# Patient Record
Sex: MALE | Race: BLACK OR AFRICAN AMERICAN | NOT HISPANIC OR LATINO | Employment: OTHER | URBAN - METROPOLITAN AREA
[De-identification: names, ages, dates, MRNs, and addresses within clinical notes are randomized per-mention and may not be internally consistent; named-entity substitution may affect disease eponyms.]

---

## 2017-02-20 ENCOUNTER — OFFICE VISIT (OUTPATIENT)
Dept: GASTROENTEROLOGY | Facility: CLINIC | Age: 72
End: 2017-02-20
Payer: MEDICARE

## 2017-02-20 ENCOUNTER — PATIENT MESSAGE (OUTPATIENT)
Dept: GASTROENTEROLOGY | Facility: CLINIC | Age: 72
End: 2017-02-20

## 2017-02-20 VITALS
HEART RATE: 78 BPM | DIASTOLIC BLOOD PRESSURE: 66 MMHG | HEIGHT: 69 IN | WEIGHT: 168.44 LBS | BODY MASS INDEX: 24.95 KG/M2 | SYSTOLIC BLOOD PRESSURE: 120 MMHG

## 2017-02-20 DIAGNOSIS — K92.1 HEMATOCHEZIA: Primary | ICD-10-CM

## 2017-02-20 DIAGNOSIS — Z86.010 PERSONAL HISTORY OF COLONIC POLYPS: ICD-10-CM

## 2017-02-20 DIAGNOSIS — K62.7 RADIATION PROCTITIS: ICD-10-CM

## 2017-02-20 DIAGNOSIS — K64.8 INTERNAL HEMORRHOIDS: ICD-10-CM

## 2017-02-20 DIAGNOSIS — K57.30 DIVERTICULOSIS OF LARGE INTESTINE WITHOUT HEMORRHAGE: ICD-10-CM

## 2017-02-20 PROCEDURE — 1126F AMNT PAIN NOTED NONE PRSNT: CPT | Mod: S$GLB,,, | Performed by: INTERNAL MEDICINE

## 2017-02-20 PROCEDURE — 99999 PR PBB SHADOW E&M-EST. PATIENT-LVL III: CPT | Mod: PBBFAC,,, | Performed by: INTERNAL MEDICINE

## 2017-02-20 PROCEDURE — 99215 OFFICE O/P EST HI 40 MIN: CPT | Mod: S$GLB,,, | Performed by: INTERNAL MEDICINE

## 2017-02-20 PROCEDURE — 1160F RVW MEDS BY RX/DR IN RCRD: CPT | Mod: S$GLB,,, | Performed by: INTERNAL MEDICINE

## 2017-02-20 PROCEDURE — 3078F DIAST BP <80 MM HG: CPT | Mod: S$GLB,,, | Performed by: INTERNAL MEDICINE

## 2017-02-20 PROCEDURE — 3074F SYST BP LT 130 MM HG: CPT | Mod: S$GLB,,, | Performed by: INTERNAL MEDICINE

## 2017-02-20 PROCEDURE — 1159F MED LIST DOCD IN RCRD: CPT | Mod: S$GLB,,, | Performed by: INTERNAL MEDICINE

## 2017-02-20 PROCEDURE — 1157F ADVNC CARE PLAN IN RCRD: CPT | Mod: S$GLB,,, | Performed by: INTERNAL MEDICINE

## 2017-02-20 RX ORDER — SODIUM, POTASSIUM,MAG SULFATES 17.5-3.13G
SOLUTION, RECONSTITUTED, ORAL ORAL
Qty: 1 BOTTLE | Refills: 0 | Status: ON HOLD | OUTPATIENT
Start: 2017-02-20 | End: 2017-04-03 | Stop reason: CLARIF

## 2017-02-20 RX ORDER — LEVOTHYROXINE SODIUM 25 UG/1
TABLET ORAL
Refills: 4 | COMMUNITY
Start: 2017-02-10 | End: 2018-01-11

## 2017-02-20 RX ORDER — LISINOPRIL AND HYDROCHLOROTHIAZIDE 10; 12.5 MG/1; MG/1
1 TABLET ORAL DAILY
COMMUNITY
End: 2018-01-11

## 2017-02-20 RX ORDER — SODIUM CHLORIDE, SODIUM LACTATE, POTASSIUM CHLORIDE, CALCIUM CHLORIDE 600; 310; 30; 20 MG/100ML; MG/100ML; MG/100ML; MG/100ML
INJECTION, SOLUTION INTRAVENOUS CONTINUOUS
Status: CANCELLED | OUTPATIENT
Start: 2017-02-20

## 2017-02-20 NOTE — PROGRESS NOTES
Subjective:       Patient ID: David Amaro is a 71 y.o. male.    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 His last colonoscopy was 3 years ago.     Rectal Bleeding   This is a chronic problem. The current episode started more than 1 year ago. The problem occurs intermittently. The problem has been unchanged. Pertinent negatives include no abdominal pain, anorexia, arthralgias, change in bowel habit, chest pain, chills, congestion, coughing, diaphoresis, fatigue, fever, headaches, joint swelling, myalgias, nausea, neck pain, numbness, rash, sore throat, swollen glands, urinary symptoms, vertigo, visual change, vomiting or weakness.     Past Medical History   Diagnosis Date    Cancer      prostate    Dyslipidemia     HTN (hypertension)     Prostate cancer      Past Surgical History   Procedure Laterality Date    Hernia repair  2009 approx    Seed implants in prostate      Tonsillectomy       Current Outpatient Prescriptions on File Prior to Visit   Medication Sig Dispense Refill    hydrochlorothiazide (MICROZIDE) 12.5 mg capsule Take 1 capsule (12.5 mg total) by mouth once daily. 30 capsule 12    tamsulosin (FLOMAX) 0.4 mg Cp24 Take 0.4 mg by mouth once daily.       No current facility-administered medications on file prior to visit.      Review of patient's allergies indicates:   Allergen Reactions    No known drug allergies      Social History     Social History    Marital status:      Spouse name: N/A    Number of children: N/A    Years of education: N/A     Occupational History    Not on file.     Social History Main Topics    Smoking status: Never Smoker    Smokeless tobacco: Never Used    Alcohol use No    Drug use: No    Sexual activity: Yes     Partners: Female     Other Topics Concern    Not on file     Social History Narrative     Family History   Problem Relation  Age of Onset    Stroke Mother     Cancer Mother     Colon cancer Mother     Diabetes Father     Cancer Sister      lung    Cancer Brother     Bone cancer Brother     Heart disease Maternal Grandmother          Review of Systems   Constitutional: Negative for chills, diaphoresis, fatigue and fever.   HENT: Negative.  Negative for congestion and sore throat.    Respiratory: Negative for cough.    Cardiovascular: Negative for chest pain.   Gastrointestinal: Positive for anal bleeding and hematochezia. Negative for abdominal pain, anorexia, change in bowel habit, nausea and vomiting.   Endocrine: Negative.    Genitourinary: Negative.    Musculoskeletal: Negative for arthralgias, joint swelling, myalgias and neck pain.   Skin: Negative for rash.   Allergic/Immunologic: Negative.    Neurological: Negative.  Negative for vertigo, weakness, numbness and headaches.   Hematological: Negative.    Psychiatric/Behavioral: Negative.        Objective:      Physical Exam   Constitutional: He is oriented to person, place, and time. He appears well-developed and well-nourished.   HENT:   Head: Normocephalic and atraumatic.   Eyes: Conjunctivae are normal. Pupils are equal, round, and reactive to light.   Neck: Normal range of motion. Neck supple. No tracheal deviation present. No thyromegaly present.   Cardiovascular: Normal rate and regular rhythm.    Pulmonary/Chest: Effort normal and breath sounds normal. He has no wheezes. He has no rales. He exhibits no tenderness.   Abdominal: Soft. There is no tenderness.   Musculoskeletal: Normal range of motion.   Lymphadenopathy:     He has no cervical adenopathy.   Neurological: He is alert and oriented to person, place, and time. No cranial nerve deficit.   Skin: Skin is warm and dry.   Psychiatric: He has a normal mood and affect. His behavior is normal.   Nursing note and vitals reviewed.      Assessment:       1. Hematochezia    2. Personal history of colonic polyps    3.  Internal hemorrhoids    4. Radiation proctitis    5. Diverticulosis of large intestine without hemorrhage        Plan:       Hematochezia  -     sodium,potassium,mag sulfates (SUPREP BOWEL PREP KIT) 17.5-3.13-1.6 gram SolR; As directed  Dispense: 1 Bottle; Refill: 0  -     Case request GI: COLONOSCOPY    Personal history of colonic polyps  Comments:  Last Colonoscopy in 2014  Risks, benefits and alternative options were discussed with the patient. Risks including but not limited to infection, bleeding, heart or respiratory problems and perforation that may require surgery were all explained to the patient. The patient had a chance for questions if there were doubts or concerns about the test. The referring provider will be notified that our consultation is complete and available through the patient's records.    Thanks for letting us participate in the care of this nice patient,      Olivier Grayson

## 2017-02-20 NOTE — MR AVS SNAPSHOT
[facility]  [address] [facility] [address]  Phone: [phone]  Fax: [phone]                  David Amaro   2017 11:20 AM   Office Visit    Description:  Male : 1945   Provider:  Olivier Grayson MD   Department:  [facility]           Reason for Visit     Rectal Bleeding           Diagnoses this Visit        Comments    Hematochezia    -  Primary     Personal history of colonic polyps     Last Colonoscopy in     Internal hemorrhoids         Radiation proctitis         Diverticulosis of large intestine without hemorrhage                To Do List           Goals (5 Years of Data)     None      Follow-Up and Disposition     Return if symptoms worsen or fail to improve.       These Medications        Disp Refills Start End    sodium,potassium,mag sulfates (SUPREP BOWEL PREP KIT) 17.5-3.13-1.6 gram SolR 1 Bottle 0 2017     As directed    Pharmacy: [facility] [address] [place] [address] [facility]  [place] Ph #: [phone]         [facility]     [facility] On Call Nurse Care Line -  Assistance  Registered nurses in the [facility] provide clinical advisement, health education, appointment booking, and other advisory services.  Call for this free service at [phone].             Medications           Message regarding Medications     Verify the changes and/or additions to your medication regime listed below are the same as discussed with your clinician today.  If any of these changes or additions are incorrect, please notify your healthcare provider.        START taking these NEW medications        Refills    sodium,potassium,mag sulfates (SUPREP BOWEL PREP KIT) 17.5-3.13-1.6 gram SolR 0    Sig: As directed    Class: Normal           Verify that the below list of medications is an accurate representation of the medications you are currently taking.  If none  "reported, the list may be blank. If incorrect, please contact your healthcare provider. Carry this list with you in case of emergency.           Current Medications     levothyroxine (SYNTHROID) 25 MCG tablet TK 1 T PO QD    lisinopril-hydrochlorothiazide (PRINZIDE,ZESTORETIC) 10-12.5 mg per tablet Take 1 tablet by mouth once daily.    hydrochlorothiazide (MICROZIDE) 12.5 mg capsule Take 1 capsule (12.5 mg total) by mouth once daily.    sodium,potassium,mag sulfates (SUPREP BOWEL PREP KIT) 17.5-3.13-1.6 gram SolR As directed    tamsulosin (FLOMAX) 0.4 mg Cp24 Take 0.4 mg by mouth once daily.           Clinical Reference Information           Your Vitals Were     BP Pulse Height Weight BMI    120/66 78 5' 9" (1.753 m) 76.4 kg (168 lb 6.9 oz) 24.87 kg/m2      Blood Pressure          Most Recent Value    BP  120/66      Allergies as of 2/20/2017     No Known Drug Allergies      Immunizations Administered on Date of Encounter - 2/20/2017     None      Orders Placed During Today's Visit      Normal Orders This Visit    Case request GI: COLONOSCOPY       Instructions      Diverticulosis    Diverticulosis means that small pouches have formed in the wall of your large intestine (colon). Most often, this problem causes no symptoms and is common as people age. But the pouches in the colon are at risk of becoming infected. When this happens, the condition is called diverticulitis. Although most people with diverticulosis never develop diverticulitis, it is still not uncommon. Rectal bleeding can also occur and in less common situations, a type of colon inflammation called colitis.  While most people do not have symptoms, some people with diverticulosis may have:  · Abdominal cramps and pain  · Bloating  · Constipation  · Change in bowel habits  Causes  The exact cause of diverticulosis (and diverticulitis) has not been proved, but a few things are associated with the condition:  · Low-fiber diet  · Constipation  · Lack of " exercise  Your healthcare provider will talk with you about how to manage your condition. Diet changes may be all that are needed to help control diverticulosis and prevent progression to diverticulitis. If you develop diverticulitis, you will likely need other treatments.  Home care  You may be told to take fiber supplements daily. Fiber adds bulk to the stool so that it passes through the colon more easily. Stool softeners may be recommended. You may also be given medications for pain relief. Be sure to take all medications as directed.  In the past, people were told to avoid corn, nuts, and seeds. This is no longer necessary.  Follow these guidelines when caring for yourself at home:  · Eat unprocessed foods that are high in fiber. Whole grains, fruits, and vegetables are good choices.  · Drink 6 to 8 glasses of water every day unless your healthcare provider has you limit how much fluid you should have.  · Watch for changes in your bowel movements. Tell your provider if you notice any changes.  · Begin an exercise program. Ask your provider how to get started. Generally, walking is the best.  · Get plenty of rest and sleep.  Follow-up care  Follow up with your healthcare provider, or as advised. Regular visits may be needed to check on your health. Sometimes special procedures such as colonoscopy, are needed after an episode of diverticulitis or blooding. Be sure to keep all your appointments.  If a stool sample was taken, or cultures were done, you should be told if they are positive, or if your treatment needs to be changed. You can call as directed for the results.  If X-rays were done, a radiologist will look at them. You will be told if there is a change in your treatment.  If antibiotics were prescribed, be sure to finish them all.  When to seek medical advice  Call your healthcare provider right away if any of these occur:  · Fever of 100.4°F (38°C) or higher, or as directed by your healthcare  provider  · Severe cramps in the lower left side of the abdomen or pain that is getting worse  · Tenderness in the lower left side of the abdomen or worsening pain throughout the abdomen  · Diarrhea or constipation that doesn't get better within 24 hours  · Nausea and vomiting  · Bleeding from the rectum  Call 911  Call emergency services if any of the following occur:  · Trouble breathing  · Confusion  · Very drowsy or trouble awakening  · Fainting or loss of consciousness  · Rapid heart rate  · Chest pain  Date Last Reviewed: 12/30/2015 © 2000-2016 [employer]. [address] [place] [address]. All rights reserved. This information is not intended as a substitute for professional medical care. Always follow your healthcare professional's instructions.        Hemorrhoids    Hemorrhoids are swollen and inflamed veins inside the rectum and near the anus. The rectum is the last several inches of the colon. The anus is the passage between the rectum and the outside of the body.  Causes  The veins can become swollen due to increased pressure in them. This is most often caused by:  · Chronic constipation or diarrhea  · Straining when having a bowel movement  · Sitting too long on the toilet  · A low-fiber diet  · Pregnancy  Symptoms  · Bleeding from the rectum (this may be noticeable after bowel movements)  · Lump near the anus  · Itching around the anus  · Pain around the anus  There are different types of hemorrhoids. Depending on the type you have and the severity, you may be able to treat yourself at home. In some cases, a procedure may be the best treatment option. Your healthcare provider can tell you more about this, if needed.  Home care  General care  · To get relief from pain or itching, try:  ¨ Topical products. Your healthcare provider may prescribe or recommend creams, ointments, or pads that can be applied to the hemorrhoid. Use these exactly as directed.  ¨ Medicines. Your  healthcare provider may recommend stool softeners, suppositories, or laxatives to help manage constipation. Use these exactly as directed.  ¨ Sitz baths. A sitz bath involves sitting in a few inches of warm bath water. Be careful not to make the water so hot that you burn yourself--test it before sitting in it. Soak for about 10 to 15 minutes a few times a day. This may help relieve pain.  Tips to help prevent hemorrhoids  · Eat more fiber. Fiber adds bulk to stool and absorbs water as it moves through your colon. This makes stool softer and easier to pass.  ¨ Increase the fiber in your diet with more fiber-rich foods. These include fresh fruit, vegetables, and whole grains.  ¨ Take a fiber supplement or bulking agent, if advised to by your provider. These include products such as psyllium or methylcellulose.  · Drink plenty of water, if directed to by your provider. This can help keep stool soft.  · Be more active. Frequent exercise aids digestion and helps prevent constipation. It may also help make bowel movements more regular.  · Dont strain during bowel movements. This can make hemorrhoids more likely. Also, dont sit on the toilet for long periods of time.  Follow-up care  Follow up with your healthcare provider, or as advised. If a culture or imaging tests were done, you will be notified of the results when they are ready. This may take a few days or longer.  When to seek medical advice  Call your healthcare provider right away if any of these occur:  · Increased bleeding from the rectum  · Increased pain around the rectum or anus  · Weakness or dizziness  Call 911  Call 911 or return to the emergency department right away if any of these occur:  · Trouble breathing or swallowing  · Fainting or loss of consciousness  · Unusually fast heart rate  · Vomiting blood  · Large amounts of blood in stool  Date Last Reviewed: 6/22/2015  © 9988-2809 [employer]. [address] [place] [address], [place] [address].  All rights reserved. This information is not intended as a substitute for professional medical care. Always follow your healthcare professional's instructions.        Colonoscopy     A camera attached to a flexible tube with a viewing lens is used to take video pictures.     Colonoscopy is a test to view the inside of your lower digestive tract (colon and rectum). Sometimes it can show the last part of the small intestine (ileum). During the test, small pieces of tissue may be removed for testing. This is called a biopsy. Small growths, such as polyps, may also be removed.   Why is colonoscopy done?  The test is done to help look for colon cancer. And it can help find the source of abdominal pain, bleeding, and changes in bowel habits. It may be needed once a year, depending on factors such as your:  · Age  · Health history  · Family health history  · Symptoms  · Results from any prior colonoscopy  Risks and possible complications  These include:  · Bleeding               · A puncture or tear in the colon   · Risks of anesthesia  · A cancer lesion not being seen  Getting ready   To prepare for the test:  · Talk with your healthcare provider about the risks of the test (see below). Also ask your healthcare provider about alternatives to the test.  · Tell your healthcare provider about any medicines you take. Also tell him or her about any health conditions you may have.  · Make sure your rectum and colon are empty for the test. Follow the diet and bowel prep instructions exactly. If you dont, the test may need to be rescheduled.  · Plan for a friend or family member to drive you home after the test.     Colonoscopy provides an inside view of the entire colon.     You may discuss the results with your doctor right away or at a future visit.  During the test   The test is usually done in the hospital on an outpatient basis. This means you go home the same day. The procedure takes about 30 minutes. During that  time:  · You are given relaxing (sedating) medicine through an IV line. You may be drowsy, or fully asleep.  · The healthcare provider will first give you a physical exam to check for anal and rectal problems.  · Then the anus is lubricated and the scope inserted.  · If you are awake, you may have a feeling similar to needing to have a bowel movement. You may also feel pressure as air is pumped into the colon. Its OK to pass gas during the procedure.  · Biopsy, polyp removal, or other treatments may be done during the test.  After the test   You may have gas right after the test. It can help to try to pass it to help prevent later bloating. Your healthcare provider may discuss the results with you right away. Or you may need to schedule a follow-up visit to talk about the results. After the test, you can go back to your normal eating and other activities. You may be tired from the sedation and need to rest for a few hours.  Date Last Reviewed: 11/1/2016 © 2000-2016 [employer]. [address] [place] [address]. All rights reserved. This information is not intended as a substitute for professional medical care. Always follow your healthcare professional's instructions.      Hematochezia  -     sodium,potassium,mag sulfates (SUPREP BOWEL PREP KIT) 17.5-3.13-1.6 gram SolR; As directed  Dispense: 1 Bottle; Refill: 0  -     Case request GI: COLONOSCOPY  -     Vital signs; Standing  -     Up ad muriel before sedation, with assistance as needed; Standing  -     POCT glucose now if patient is diabetic; Standing  -     Insert peripheral IV or access implanted port - a - cath if present; Standing  -     Diet NPO; Standing  -     lactated ringers infusion; Inject into the vein continuous.  -     Protime-INR; Standing    Personal history of colonic polyps  Comments:  Last Colonoscopy in 2014  Orders:  -     Vital signs; Standing  -     Up ad muriel before sedation, with assistance as needed; Standing  -      POCT glucose now if patient is diabetic; Standing  -     Insert peripheral IV or access implanted port - a - cath if present; Standing  -     Diet NPO; Standing  -     lactated ringers infusion; Inject into the vein continuous.  -     Protime-INR; Standing    Internal hemorrhoids  -     Vital signs; Standing  -     Up ad muriel before sedation, with assistance as needed; Standing  -     POCT glucose now if patient is diabetic; Standing  -     Insert peripheral IV or access implanted port - a - cath if present; Standing  -     Diet NPO; Standing  -     lactated ringers infusion; Inject into the vein continuous.  -     Protime-INR; Standing    Radiation proctitis  -     Vital signs; Standing  -     Up ad muriel before sedation, with assistance as needed; Standing  -     POCT glucose now if patient is diabetic; Standing  -     Insert peripheral IV or access implanted port - a - cath if present; Standing  -     Diet NPO; Standing  -     lactated ringers infusion; Inject into the vein continuous.  -     Protime-INR; Standing    Diverticulosis of large intestine without hemorrhage  -     Vital signs; Standing  -     Up ad muriel before sedation, with assistance as needed; Standing  -     POCT glucose now if patient is diabetic; Standing  -     Insert peripheral IV or access implanted port - a - cath if present; Standing  -     Diet NPO; Standing  -     lactated ringers infusion; Inject into the vein continuous.  -     Protime-INR; Standing             Language Assistance Services     ATTENTION: Language assistance services are available, free of charge. Please call [phone].      ATENCIÓN: Si habla español, tiene a benitez disposición servicios gratuitos de asistencia lingüística. Llame al [phone].     CHÚ Ý: N?u b?n nói Ti?ng Vi?t, có các d?ch v? h? tr? ngôn ng? mi?n phí dành cho b?n. G?i s? [phone].         [facility] complies with applicable Federal civil rights laws and does not discriminate on  the basis of race, color, national origin, age, disability, or sex.

## 2017-02-20 NOTE — PATIENT INSTRUCTIONS
Diverticulosis    Diverticulosis means that small pouches have formed in the wall of your large intestine (colon). Most often, this problem causes no symptoms and is common as people age. But the pouches in the colon are at risk of becoming infected. When this happens, the condition is called diverticulitis. Although most people with diverticulosis never develop diverticulitis, it is still not uncommon. Rectal bleeding can also occur and in less common situations, a type of colon inflammation called colitis.  While most people do not have symptoms, some people with diverticulosis may have:  · Abdominal cramps and pain  · Bloating  · Constipation  · Change in bowel habits  Causes  The exact cause of diverticulosis (and diverticulitis) has not been proved, but a few things are associated with the condition:  · Low-fiber diet  · Constipation  · Lack of exercise  Your healthcare provider will talk with you about how to manage your condition. Diet changes may be all that are needed to help control diverticulosis and prevent progression to diverticulitis. If you develop diverticulitis, you will likely need other treatments.  Home care  You may be told to take fiber supplements daily. Fiber adds bulk to the stool so that it passes through the colon more easily. Stool softeners may be recommended. You may also be given medications for pain relief. Be sure to take all medications as directed.  In the past, people were told to avoid corn, nuts, and seeds. This is no longer necessary.  Follow these guidelines when caring for yourself at home:  · Eat unprocessed foods that are high in fiber. Whole grains, fruits, and vegetables are good choices.  · Drink 6 to 8 glasses of water every day unless your healthcare provider has you limit how much fluid you should have.  · Watch for changes in your bowel movements. Tell your provider if you notice any changes.  · Begin an exercise program. Ask your provider how to get started.  Generally, walking is the best.  · Get plenty of rest and sleep.  Follow-up care  Follow up with your healthcare provider, or as advised. Regular visits may be needed to check on your health. Sometimes special procedures such as colonoscopy, are needed after an episode of diverticulitis or blooding. Be sure to keep all your appointments.  If a stool sample was taken, or cultures were done, you should be told if they are positive, or if your treatment needs to be changed. You can call as directed for the results.  If X-rays were done, a radiologist will look at them. You will be told if there is a change in your treatment.  If antibiotics were prescribed, be sure to finish them all.  When to seek medical advice  Call your healthcare provider right away if any of these occur:  · Fever of 100.4°F (38°C) or higher, or as directed by your healthcare provider  · Severe cramps in the lower left side of the abdomen or pain that is getting worse  · Tenderness in the lower left side of the abdomen or worsening pain throughout the abdomen  · Diarrhea or constipation that doesn't get better within 24 hours  · Nausea and vomiting  · Bleeding from the rectum  Call 911  Call emergency services if any of the following occur:  · Trouble breathing  · Confusion  · Very drowsy or trouble awakening  · Fainting or loss of consciousness  · Rapid heart rate  · Chest pain  Date Last Reviewed: 12/30/2015 © 2000-2016 [employer]. [address] [place] [address]. All rights reserved. This information is not intended as a substitute for professional medical care. Always follow your healthcare professional's instructions.        Hemorrhoids    Hemorrhoids are swollen and inflamed veins inside the rectum and near the anus. The rectum is the last several inches of the colon. The anus is the passage between the rectum and the outside of the body.  Causes  The veins can become swollen due to increased pressure in them. This  is most often caused by:  · Chronic constipation or diarrhea  · Straining when having a bowel movement  · Sitting too long on the toilet  · A low-fiber diet  · Pregnancy  Symptoms  · Bleeding from the rectum (this may be noticeable after bowel movements)  · Lump near the anus  · Itching around the anus  · Pain around the anus  There are different types of hemorrhoids. Depending on the type you have and the severity, you may be able to treat yourself at home. In some cases, a procedure may be the best treatment option. Your healthcare provider can tell you more about this, if needed.  Home care  General care  · To get relief from pain or itching, try:  ¨ Topical products. Your healthcare provider may prescribe or recommend creams, ointments, or pads that can be applied to the hemorrhoid. Use these exactly as directed.  ¨ Medicines. Your healthcare provider may recommend stool softeners, suppositories, or laxatives to help manage constipation. Use these exactly as directed.  ¨ Sitz baths. A sitz bath involves sitting in a few inches of warm bath water. Be careful not to make the water so hot that you burn yourself--test it before sitting in it. Soak for about 10 to 15 minutes a few times a day. This may help relieve pain.  Tips to help prevent hemorrhoids  · Eat more fiber. Fiber adds bulk to stool and absorbs water as it moves through your colon. This makes stool softer and easier to pass.  ¨ Increase the fiber in your diet with more fiber-rich foods. These include fresh fruit, vegetables, and whole grains.  ¨ Take a fiber supplement or bulking agent, if advised to by your provider. These include products such as psyllium or methylcellulose.  · Drink plenty of water, if directed to by your provider. This can help keep stool soft.  · Be more active. Frequent exercise aids digestion and helps prevent constipation. It may also help make bowel movements more regular.  · Dont strain during bowel movements. This can make  hemorrhoids more likely. Also, dont sit on the toilet for long periods of time.  Follow-up care  Follow up with your healthcare provider, or as advised. If a culture or imaging tests were done, you will be notified of the results when they are ready. This may take a few days or longer.  When to seek medical advice  Call your healthcare provider right away if any of these occur:  · Increased bleeding from the rectum  · Increased pain around the rectum or anus  · Weakness or dizziness  Call 911  Call 911 or return to the emergency department right away if any of these occur:  · Trouble breathing or swallowing  · Fainting or loss of consciousness  · Unusually fast heart rate  · Vomiting blood  · Large amounts of blood in stool  Date Last Reviewed: 6/22/2015 © 2000-2016 [employer]. [address] [place] [address], [place] [address]. All rights reserved. This information is not intended as a substitute for professional medical care. Always follow your healthcare professional's instructions.        Colonoscopy     A camera attached to a flexible tube with a viewing lens is used to take video pictures.     Colonoscopy is a test to view the inside of your lower digestive tract (colon and rectum). Sometimes it can show the last part of the small intestine (ileum). During the test, small pieces of tissue may be removed for testing. This is called a biopsy. Small growths, such as polyps, may also be removed.   Why is colonoscopy done?  The test is done to help look for colon cancer. And it can help find the source of abdominal pain, bleeding, and changes in bowel habits. It may be needed once a year, depending on factors such as your:  · Age  · Health history  · Family health history  · Symptoms  · Results from any prior colonoscopy  Risks and possible complications  These include:  · Bleeding               · A puncture or tear in the colon   · Risks of anesthesia  · A cancer lesion not being seen  Getting ready   To  prepare for the test:  · Talk with your healthcare provider about the risks of the test (see below). Also ask your healthcare provider about alternatives to the test.  · Tell your healthcare provider about any medicines you take. Also tell him or her about any health conditions you may have.  · Make sure your rectum and colon are empty for the test. Follow the diet and bowel prep instructions exactly. If you dont, the test may need to be rescheduled.  · Plan for a friend or family member to drive you home after the test.     Colonoscopy provides an inside view of the entire colon.     You may discuss the results with your doctor right away or at a future visit.  During the test   The test is usually done in the hospital on an outpatient basis. This means you go home the same day. The procedure takes about 30 minutes. During that time:  · You are given relaxing (sedating) medicine through an IV line. You may be drowsy, or fully asleep.  · The healthcare provider will first give you a physical exam to check for anal and rectal problems.  · Then the anus is lubricated and the scope inserted.  · If you are awake, you may have a feeling similar to needing to have a bowel movement. You may also feel pressure as air is pumped into the colon. Its OK to pass gas during the procedure.  · Biopsy, polyp removal, or other treatments may be done during the test.  After the test   You may have gas right after the test. It can help to try to pass it to help prevent later bloating. Your healthcare provider may discuss the results with you right away. Or you may need to schedule a follow-up visit to talk about the results. After the test, you can go back to your normal eating and other activities. You may be tired from the sedation and need to rest for a few hours.  Date Last Reviewed: 11/1/2016  © 9132-5981 [employer] [address] [place] [address], [place] [address]. All rights reserved. This information is not intended  as a substitute for professional medical care. Always follow your healthcare professional's instructions.      Hematochezia  -     sodium,potassium,mag sulfates (SUPREP BOWEL PREP KIT) 17.5-3.13-1.6 gram SolR; As directed  Dispense: 1 Bottle; Refill: 0  -     Case request GI: COLONOSCOPY  -     Vital signs; Standing  -     Up ad muriel before sedation, with assistance as needed; Standing  -     POCT glucose now if patient is diabetic; Standing  -     Insert peripheral IV or access implanted port - a - cath if present; Standing  -     Diet NPO; Standing  -     lactated ringers infusion; Inject into the vein continuous.  -     Protime-INR; Standing    Personal history of colonic polyps  Comments:  Last Colonoscopy in 2014  Orders:  -     Vital signs; Standing  -     Up ad muriel before sedation, with assistance as needed; Standing  -     POCT glucose now if patient is diabetic; Standing  -     Insert peripheral IV or access implanted port - a - cath if present; Standing  -     Diet NPO; Standing  -     lactated ringers infusion; Inject into the vein continuous.  -     Protime-INR; Standing    Internal hemorrhoids  -     Vital signs; Standing  -     Up ad muriel before sedation, with assistance as needed; Standing  -     POCT glucose now if patient is diabetic; Standing  -     Insert peripheral IV or access implanted port - a - cath if present; Standing  -     Diet NPO; Standing  -     lactated ringers infusion; Inject into the vein continuous.  -     Protime-INR; Standing    Radiation proctitis  -     Vital signs; Standing  -     Up ad muriel before sedation, with assistance as needed; Standing  -     POCT glucose now if patient is diabetic; Standing  -     Insert peripheral IV or access implanted port - a - cath if present; Standing  -     Diet NPO; Standing  -     lactated ringers infusion; Inject into the vein continuous.  -     Protime-INR; Standing    Diverticulosis of large intestine without hemorrhage  -     Vital signs;  Standing  -     Up ad muriel before sedation, with assistance as needed; Standing  -     POCT glucose now if patient is diabetic; Standing  -     Insert peripheral IV or access implanted port - a - cath if present; Standing  -     Diet NPO; Standing  -     lactated ringers infusion; Inject into the vein continuous.  -     Protime-INR; Standing

## 2017-03-31 ENCOUNTER — TELEPHONE (OUTPATIENT)
Dept: GASTROENTEROLOGY | Facility: CLINIC | Age: 72
End: 2017-03-31

## 2017-03-31 RX ORDER — POLYETHYLENE GLYCOL 3350, SODIUM SULFATE ANHYDROUS, SODIUM BICARBONATE, SODIUM CHLORIDE, POTASSIUM CHLORIDE 236; 22.74; 6.74; 5.86; 2.97 G/4L; G/4L; G/4L; G/4L; G/4L
4 POWDER, FOR SOLUTION ORAL ONCE
Qty: 4000 ML | Refills: 0 | Status: SHIPPED | OUTPATIENT
Start: 2017-03-31 | End: 2017-03-31

## 2017-03-31 NOTE — TELEPHONE ENCOUNTER
----- Message from Amalia Mcgarry sent at 3/31/2017 12:37 PM CDT -----  Contact: Patient   Patient stated that he is schedule for an colonoscopy on 4/3/17 and the prep kit was not sent to pharmacy, Please call him at [phone].      [facility] [employer] [id number]  OLEGARIO HERNANDEZ  [address] [facility] AT [facility]  [address] [facility] [place] [address]  Phone: [phone] Fax: [phone]    Thanks  Td

## 2017-03-31 NOTE — TELEPHONE ENCOUNTER
----- Message from Driss Alfred sent at 3/31/2017  3:22 PM CDT -----  Contact: Patient  Please call pt back regarding a substitute for his colonoscopy prep medication. Pt states that his insurance does not cover the medication and would like an alternative. Please call pt back @ ..[phone] (home) Thank you/NH        ..  [employer] [address] [place], [place] [address] - [address] [facility] AT [facility] [place] [facility] [employer] [facility]  [address] [facility] [address]  Phone: [phone] Fax: [phone]

## 2017-04-02 PROBLEM — K57.30 DIVERTICULOSIS OF LARGE INTESTINE WITHOUT HEMORRHAGE: Chronic | Status: ACTIVE | Noted: 2017-04-02

## 2017-04-02 PROBLEM — Z86.0100 HISTORY OF COLON POLYPS: Status: ACTIVE | Noted: 2017-04-02

## 2017-04-02 PROBLEM — K62.7 RADIATION INDUCED PROCTITIS: Chronic | Status: ACTIVE | Noted: 2017-04-02

## 2017-04-02 PROBLEM — Z86.010 HISTORY OF COLON POLYPS: Status: ACTIVE | Noted: 2017-04-02

## 2017-04-02 PROBLEM — K92.1 HEMATOCHEZIA: Status: ACTIVE | Noted: 2017-04-02

## 2017-04-02 RX ORDER — SODIUM CHLORIDE, SODIUM LACTATE, POTASSIUM CHLORIDE, CALCIUM CHLORIDE 600; 310; 30; 20 MG/100ML; MG/100ML; MG/100ML; MG/100ML
INJECTION, SOLUTION INTRAVENOUS CONTINUOUS
Status: CANCELLED | OUTPATIENT
Start: 2017-04-02

## 2017-04-03 ENCOUNTER — ANESTHESIA EVENT (OUTPATIENT)
Dept: ENDOSCOPY | Facility: HOSPITAL | Age: 72
End: 2017-04-03
Payer: MEDICARE

## 2017-04-03 ENCOUNTER — SURGERY (OUTPATIENT)
Age: 72
End: 2017-04-03

## 2017-04-03 ENCOUNTER — HOSPITAL ENCOUNTER (OUTPATIENT)
Facility: HOSPITAL | Age: 72
Discharge: HOME | End: 2017-04-03
Attending: INTERNAL MEDICINE | Admitting: INTERNAL MEDICINE
Payer: MEDICARE

## 2017-04-03 ENCOUNTER — PATIENT MESSAGE (OUTPATIENT)
Dept: GASTROENTEROLOGY | Facility: HOSPITAL | Age: 72
End: 2017-04-03

## 2017-04-03 ENCOUNTER — ANESTHESIA (OUTPATIENT)
Dept: ENDOSCOPY | Facility: HOSPITAL | Age: 72
End: 2017-04-03
Payer: MEDICARE

## 2017-04-03 VITALS
BODY MASS INDEX: 24.14 KG/M2 | HEIGHT: 69 IN | SYSTOLIC BLOOD PRESSURE: 122 MMHG | TEMPERATURE: 98 F | HEART RATE: 68 BPM | RESPIRATION RATE: 18 BRPM | OXYGEN SATURATION: 98 % | WEIGHT: 163 LBS | DIASTOLIC BLOOD PRESSURE: 68 MMHG

## 2017-04-03 VITALS — RESPIRATION RATE: 15 BRPM

## 2017-04-03 DIAGNOSIS — Z86.010 PERSONAL HISTORY OF COLONIC POLYPS: ICD-10-CM

## 2017-04-03 DIAGNOSIS — K64.8 INTERNAL HEMORRHOIDS: ICD-10-CM

## 2017-04-03 DIAGNOSIS — K92.1 HEMATOCHEZIA: ICD-10-CM

## 2017-04-03 DIAGNOSIS — D12.3 BENIGN NEOPLASM OF TRANSVERSE COLON: ICD-10-CM

## 2017-04-03 DIAGNOSIS — K57.30 DIVERTICULOSIS OF LARGE INTESTINE WITHOUT HEMORRHAGE: ICD-10-CM

## 2017-04-03 DIAGNOSIS — Z86.010 HISTORY OF COLON POLYPS: Primary | ICD-10-CM

## 2017-04-03 DIAGNOSIS — K62.7 RADIATION PROCTITIS: ICD-10-CM

## 2017-04-03 PROCEDURE — 25000003 PHARM REV CODE 250: Performed by: INTERNAL MEDICINE

## 2017-04-03 PROCEDURE — 37000008 HC ANESTHESIA 1ST 15 MINUTES: Performed by: INTERNAL MEDICINE

## 2017-04-03 PROCEDURE — 27201012 HC FORCEPS, HOT/COLD, DISP: Performed by: INTERNAL MEDICINE

## 2017-04-03 PROCEDURE — 63600175 PHARM REV CODE 636 W HCPCS: Performed by: NURSE ANESTHETIST, CERTIFIED REGISTERED

## 2017-04-03 PROCEDURE — 88305 TISSUE EXAM BY PATHOLOGIST: CPT | Mod: 26,,, | Performed by: PATHOLOGY

## 2017-04-03 PROCEDURE — 37000009 HC ANESTHESIA EA ADD 15 MINS: Performed by: INTERNAL MEDICINE

## 2017-04-03 PROCEDURE — 45380 COLONOSCOPY AND BIOPSY: CPT | Performed by: INTERNAL MEDICINE

## 2017-04-03 PROCEDURE — 25000003 PHARM REV CODE 250: Performed by: NURSE ANESTHETIST, CERTIFIED REGISTERED

## 2017-04-03 PROCEDURE — 45380 COLONOSCOPY AND BIOPSY: CPT | Mod: ,,, | Performed by: INTERNAL MEDICINE

## 2017-04-03 PROCEDURE — 88305 TISSUE EXAM BY PATHOLOGIST: CPT | Performed by: PATHOLOGY

## 2017-04-03 RX ORDER — LIDOCAINE HYDROCHLORIDE 10 MG/ML
INJECTION INFILTRATION; PERINEURAL
Status: DISCONTINUED | OUTPATIENT
Start: 2017-04-03 | End: 2017-04-03

## 2017-04-03 RX ORDER — SODIUM CHLORIDE, SODIUM LACTATE, POTASSIUM CHLORIDE, CALCIUM CHLORIDE 600; 310; 30; 20 MG/100ML; MG/100ML; MG/100ML; MG/100ML
INJECTION, SOLUTION INTRAVENOUS CONTINUOUS
Status: DISCONTINUED | OUTPATIENT
Start: 2017-04-03 | End: 2017-04-03 | Stop reason: HOSPADM

## 2017-04-03 RX ORDER — PROPOFOL 10 MG/ML
VIAL (ML) INTRAVENOUS
Status: DISCONTINUED | OUTPATIENT
Start: 2017-04-03 | End: 2017-04-03

## 2017-04-03 RX ADMIN — PROPOFOL 50 MG: 10 INJECTION, EMULSION INTRAVENOUS at 09:04

## 2017-04-03 RX ADMIN — PROPOFOL 20 MG: 10 INJECTION, EMULSION INTRAVENOUS at 09:04

## 2017-04-03 RX ADMIN — PROPOFOL 30 MG: 10 INJECTION, EMULSION INTRAVENOUS at 09:04

## 2017-04-03 RX ADMIN — LIDOCAINE HYDROCHLORIDE 50 MG: 10 INJECTION, SOLUTION INFILTRATION; PERINEURAL at 09:04

## 2017-04-03 RX ADMIN — SODIUM CHLORIDE, SODIUM LACTATE, POTASSIUM CHLORIDE, AND CALCIUM CHLORIDE: .6; .31; .03; .02 INJECTION, SOLUTION INTRAVENOUS at 08:04

## 2017-04-03 NOTE — H&P
[facility]  Gastroenterology  H&P    Patient Name: David Amaro  MRN: [id number]  Admission Date: 4/3/2017  Code Status: No Order    Attending Provider: Olivier Grayson MD   Primary Care Physician: Jalen Pierre MD  Principal Problem:Hematochezia    Subjective:     History of Present Illness:  HPI Comments: Mr. Amaro has a history of radiation proctitis, diverticulosis and personal history of colon polyps. His last colonoscopy was 3 years ago.      Rectal Bleeding   This is a chronic problem. The current episode started more than 1 year ago. The problem occurs intermittently. The problem has been unchanged. Pertinent negatives include no abdominal pain, anorexia, arthralgias, change in bowel habit, chest pain, chills, congestion, coughing, diaphoresis, fatigue, fever, headaches, joint swelling, myalgias, nausea, neck pain, numbness, rash, sore throat, swollen glands, urinary symptoms, vertigo, visual change, vomiting or weakness.     Past Medical History:   Diagnosis Date    Arthritis     Cancer     prostate    Dyslipidemia     HTN (hypertension)     Prostate cancer     Thyroid disease        Past Surgical History:   Procedure Laterality Date    HERNIA REPAIR  2009 approx    seed implants in prostate      TONSILLECTOMY         Review of patient's allergies indicates:   Allergen Reactions    No known drug allergies      Family History     Problem Relation (Age of Onset)    Bone cancer Brother    Cancer Mother, Sister, Brother    Colon cancer Mother    Diabetes Father    Heart disease Maternal Grandmother    Stroke Mother        Social History Main Topics    Smoking status: Never Smoker    Smokeless tobacco: Never Used    Alcohol use No    Drug use: No    Sexual activity: Yes     Partners: Female     Review of Systems   Constitutional: Negative for activity change, appetite change, fatigue, fever and unexpected weight change.   HENT: Negative for congestion, ear pain, hearing  loss, mouth sores, trouble swallowing and voice change.    Eyes: Negative for pain, redness and itching.   Respiratory: Negative for apnea, cough, choking, chest tightness, shortness of breath and wheezing.    Cardiovascular: Negative for chest pain, palpitations and leg swelling.   Gastrointestinal: Positive for anal bleeding and blood in stool. Negative for abdominal distention, abdominal pain, constipation, diarrhea, nausea and vomiting.   Endocrine: Negative for cold intolerance, heat intolerance and polyphagia.   Genitourinary: Negative for dysuria, hematuria and urgency.   Musculoskeletal: Negative for arthralgias, joint swelling and neck pain.   Skin: Negative for pallor and rash.   Allergic/Immunologic: Negative for environmental allergies and food allergies.   Neurological: Negative for dizziness, facial asymmetry and light-headedness.   Hematological: Negative for adenopathy. Does not bruise/bleed easily.   Psychiatric/Behavioral: Negative for agitation, behavioral problems, confusion and decreased concentration.     Objective:     Vital Signs (Most Recent):    Vital Signs (24h Range):           There is no height or weight on file to calculate BMI.    No intake or output data in the 24 hours ending 04/03/17 0838    Lines/Drains/Airways          No matching active lines, drains, or airways          Physical Exam   Constitutional: He is oriented to person, place, and time. He appears well-developed and well-nourished.   HENT:   Head: Normocephalic and atraumatic.   Eyes: Conjunctivae are normal. Pupils are equal, round, and reactive to light.   Neck: Normal range of motion. Neck supple. No tracheal deviation present. No thyromegaly present.   Cardiovascular: Normal rate and regular rhythm.    Pulmonary/Chest: Effort normal and breath sounds normal. He has no wheezes. He has no rales. He exhibits no tenderness.   Abdominal: Soft. Bowel sounds are normal. He exhibits no distension and no mass. There is no  tenderness. There is no guarding.   Musculoskeletal: Normal range of motion.   Lymphadenopathy:     He has no cervical adenopathy.   Neurological: He is alert and oriented to person, place, and time. No cranial nerve deficit.   Skin: Skin is warm and dry.   Psychiatric: He has a normal mood and affect. His behavior is normal.   Nursing note and vitals reviewed.      Significant Labs:  CBC: No results for input(s): WBC, HGB, HCT, PLT in the last 48 hours.  BMP: No results for input(s): GLU, NA, K, CL, CO2, BUN, CREATININE, CALCIUM, MG in the last 48 hours.  Coagulation: No results for input(s): INR, APTT in the last 48 hours.    Invalid input(s): PT  Liver Function Test: No results for input(s): ALT, AST, ALKPHOS, BILITOT, PROT, ALBUMIN in the last 48 hours.    Significant Imaging:  None    Assessment/Plan:     Active Diagnoses:    Diagnosis Date Noted POA    PRINCIPAL PROBLEM:  Hematochezia [K92.1] 04/02/2017 Yes    History of colon polyps [Z86.010] 04/02/2017 Not Applicable    Diverticulosis of large intestine without hemorrhage [K57.30] 04/02/2017 Yes     Chronic    Radiation induced proctitis [K62.7] 04/02/2017 Yes     Chronic      Problems Resolved During this Admission:    Diagnosis Date Noted Date Resolved POA       Hematochezia  -     Case request GI: COLONOSCOPY  -     Insert peripheral IV or access implanted port - a - cath if present; Standing  -     Up ad muriel before sedation, with assistance as needed; Standing  -     Diet NPO; Standing  -     lactated ringers infusion; Inject into the vein continuous.  -     Vital signs; Standing    Personal history of colonic polyps  Comments:  Last Colonoscopy in 2014  Orders:  -     Insert peripheral IV or access implanted port - a - cath if present; Standing  -     Up ad muriel before sedation, with assistance as needed; Standing  -     Diet NPO; Standing  -     lactated ringers infusion; Inject into the vein continuous.  -     Vital signs; Standing    Internal  hemorrhoids  -     Insert peripheral IV or access implanted port - a - cath if present; Standing  -     Up ad muriel before sedation, with assistance as needed; Standing  -     Diet NPO; Standing  -     lactated ringers infusion; Inject into the vein continuous.  -     Vital signs; Standing    Radiation proctitis  -     Insert peripheral IV or access implanted port - a - cath if present; Standing  -     Up ad muriel before sedation, with assistance as needed; Standing  -     Diet NPO; Standing  -     lactated ringers infusion; Inject into the vein continuous.  -     Vital signs; Standing    Diverticulosis of large intestine without hemorrhage  -     Insert peripheral IV or access implanted port - a - cath if present; Standing  -     Up ad muriel before sedation, with assistance as needed; Standing  -     Diet NPO; Standing  -     lactated ringers infusion; Inject into the vein continuous.  -     Vital signs; Standing    Other orders  -     Vital signs; Standing  -     Up ad muriel before sedation, with assistance as needed; Standing  -     POCT glucose now if patient is diabetic; Standing  -     Insert peripheral IV or access implanted port - a - cath if present; Standing  -     Diet NPO; Standing  -     lactated ringers infusion; Inject into the vein continuous.  -     Protime-INR; Standing          Olivier Grayson MD  Gastroenterology  [facility]

## 2017-04-03 NOTE — IP AVS SNAPSHOT
[address] [place] [address]           Patient Discharge Instructions   Our goal is to set you up for success. This packet includes information on your condition, medications, and your home care.  It will help you care for yourself to prevent having to return to the hospital.     Please ask your nurse if you have any questions.      There are many details to remember when preparing to leave the hospital. Here is what you will need to do:    1. Take your medicine. If you are prescribed medications, review your Medication List on the following pages. You may have new medications to  at the pharmacy and others that you'll need to stop taking. Review the instructions for how and when to take your medications. Talk with your doctor or nurses if you are unsure of what to do.     2. Go to your follow-up appointments. Specific follow-up information is listed in the following pages. Your may be contacted by a nurse or clinical provider about future appointments. Be sure we have all of the phone numbers to reach you. Please contact your provider's office if you are unable to make an appointment.     3. Watch for warning signs. Your doctor or nurse will give you detailed warning signs to watch for and when to call for assistance. These instructions may also include educational information about your condition. If you experience any of warning signs to your health, call your doctor.               ** Verify the list of medication(s) below is accurate and up to date. Carry this with you in case of emergency. If your medications have changed, please notify your healthcare provider.             Medication List      CONTINUE taking these medications        Additional Info                      FLOMAX 0.4 mg Cp24   Refills:  0   Dose:  0.4 mg   Generic drug:  tamsulosin    Instructions:  Take 0.4 mg by mouth once daily.     Begin Date    AM    Noon    PM    Bedtime        hydrochlorothiazide 12.5 mg capsule   Commonly known as:  MICROZIDE   Quantity:  30 capsule   Refills:  12   Dose:  12.5 mg    Instructions:  Take 1 capsule (12.5 mg total) by mouth once daily.     Begin Date    AM    Noon    PM    Bedtime       levothyroxine 25 MCG tablet   Commonly known as:  SYNTHROID   Refills:  4    Instructions:  TK 1 T PO QD     Begin Date    AM    Noon    PM    Bedtime       lisinopril-hydrochlorothiazide 10-12.5 mg per tablet   Commonly known as:  PRINZIDE,ZESTORETIC   Refills:  0   Dose:  1 tablet    Instructions:  Take 1 tablet by mouth once daily.     Begin Date    AM    Noon    PM    Bedtime                  Please bring to all follow up appointments:    1. A copy of your discharge instructions.  2. All medicines you are currently taking in their original bottles.  3. Identification and insurance card.    Please arrive 15 minutes ahead of scheduled appointment time.    Please call 24 hours in advance if you must reschedule your appointment and/or time.        Follow-up Information     Follow up with Jalen Pierre MD.    Specialty:  Internal Medicine    Why:  As needed    Contact information:    [address] [facility] [address]  [phone]          Discharge Instructions     Future Orders    Activity as tolerated     Call MD for:  difficulty breathing, headache or visual disturbances     Call MD for:  hives     Call MD for:  persistent dizziness or light-headedness     Call MD for:  persistent nausea and vomiting     Call MD for:  redness, tenderness, or signs of infection (pain, swelling, redness, odor or green/yellow discharge around incision site)     Call MD for:  severe uncontrolled pain     Call MD for:  temperature >100.4     Diet general     Questions:    Total calories:      Fat restriction, if any:      Protein restriction, if any:      Na restriction, if any:      Fluid restriction:      Additional restrictions:      No dressing needed   "       Discharge Instructions       If you develop fevers, severe abdominal pain, significant bleeding, nausea or vomiting, please contact us or come to our Emergency Department at [facility].  Our  contact number is [phone] available for emergencies or any question that you may have.      You may return to normal activities tomorrow.  Written discharge instructions were provided to you today and have them handy since you may not remember our conversation today.       I also recommend that you follow a high fiber diet and take calcium supplements daily as well as to continue your present medications.      If you take Aspirin, please resume taking it at your prior dose today. If we obtained biopsies or removed polyps during your procedure, we will contact you within 5 to 6 days with the results.      Thanks for trusting us with your healthcare needs.      Sincerely,     Olivier Grayson M.D.        To rate your experience with Dr. Grayson, please click on the link below     [email or web address]      Discharge References/Attachments     DIVERTICULOSIS (ENGLISH)    HEMORRHOIDS (ENGLISH)    COLON AND RECTAL POLYPS, UNDERSTANDING (ENGLISH)        Primary Diagnosis     Your primary diagnosis was:  Blood In Stool      Admission Information     Date & Time Provider Department CSN    4/3/2017  8:15 AM Olivier Grayson MD [facility] [id number]      Care Providers     Provider Role Specialty Primary office phone    Olivier Grayson MD Attending Provider Gastroenterology [phone]    Olivier Grayson MD Surgeon  Gastroenterology [phone]      Your Vitals Were     BP Pulse Temp Resp Height Weight    155/95 (BP Location: Left arm, Patient Position: Lying, BP Method: Automatic) 70 97.7 °F (36.5 °C) (Oral) 20 5' 9" (1.753 m) 73.9 kg (163 lb)    SpO2 BMI             98% 24.07 kg/m2         Recent Lab Values        12/20/2005                           4:43 " PM           A1C 5.7                       Pending Labs     Order Current Status    Specimen to Pathology - Surgery Collected (04/03/17 0939)      Allergies as of 4/3/2017        Reactions    No Known Drug Allergies       [facility]     [facility] Nurse Care Line - 24/7 Assistance  Unless otherwise directed by your provider, please contact [facility], our nurse care line that is available for 24/7 assistance.     Registered nurses in the [facility] provide clinical advisement, health education, appointment booking, and other advisory services.  Call for this free service at [phone].        Advance Directives     An advance directive is a document which, in the event you are no longer able to make decisions for yourself, tells your healthcare team what kind of treatment you do or do not want to receive, or who you would like to make those decisions for you.  If you do not currently have an advance directive, [facility] encourages you to create one.  For more information call:  [phone]), [phone] ([phone]),  or log on to [email or web address].        Language Assistance Services     ATTENTION: Language assistance services are available, free of charge. Please call [phone].      ATENCIÓN: Si habla isabel, tiene a benitez disposición servicios gratuitos de asistencia lingüística. Llame al [phone].     [facility] Ý: N?u b?n nói Ti?ng Vi?t, có các d?ch v? h? tr? ngôn ng? mi?n phí dành cho b?n. G?i s? [phone].         [facility]  complies with applicable Federal civil rights laws and does not discriminate on the basis of race, color, national origin, age, disability, or sex.

## 2017-04-03 NOTE — DISCHARGE INSTRUCTIONS
If you develop fevers, severe abdominal pain, significant bleeding, nausea or vomiting, please contact us or come to our Emergency Department at [facility].  Our  contact number is [phone] available for emergencies or any question that you may have.      You may return to normal activities tomorrow.  Written discharge instructions were provided to you today and have them handy since you may not remember our conversation today.       I also recommend that you follow a high fiber diet and take calcium supplements daily as well as to continue your present medications.      If you take Aspirin, please resume taking it at your prior dose today. If we obtained biopsies or removed polyps during your procedure, we will contact you within 5 to 6 days with the results.      Thanks for trusting us with your healthcare needs.      Sincerely,     Olivier Grayson M.D.        To rate your experience with Dr. Grayson, please click on the link below     [email or web address]

## 2017-04-03 NOTE — TRANSFER OF CARE
"Anesthesia Transfer of Care Note    Patient: David Amaro    Procedure(s) Performed: Procedure(s) (LRB):  COLONOSCOPY (N/A)    Patient location: PACU    Anesthesia Type: MAC    Transport from OR: Transported from OR on room air with adequate spontaneous ventilation    Post pain: adequate analgesia    Post assessment: no apparent anesthetic complications and tolerated procedure well    Post vital signs: stable    Level of consciousness: awake    Nausea/Vomiting: no nausea/vomiting    Complications: none          Last vitals:   Visit Vitals    BP (!) 155/95 (BP Location: Left arm, Patient Position: Lying, BP Method: Automatic)    Pulse 70    Temp 36.5 °C (97.7 °F) (Oral)    Resp 20    Ht 5' 9" (1.753 m)    Wt 73.9 kg (163 lb)    SpO2 98%    BMI 24.07 kg/m2     "

## 2017-04-03 NOTE — ANESTHESIA RELEASE NOTE
"Anesthesia Release from PACU Note    Patient: David Amaro    Procedure(s) Performed: Procedure(s) (LRB):  COLONOSCOPY (N/A)    Anesthesia type: MAC    Post pain: Adequate analgesia    Post assessment: no apparent anesthetic complications, tolerated procedure well and no evidence of recall    Last Vitals:   Visit Vitals    BP (!) 155/95 (BP Location: Left arm, Patient Position: Lying, BP Method: Automatic)    Pulse 70    Temp 36.5 °C (97.7 °F) (Oral)    Resp 20    Ht 5' 9" (1.753 m)    Wt 73.9 kg (163 lb)    SpO2 98%    BMI 24.07 kg/m2       Post vital signs: stable    Level of consciousness: awake    Nausea/Vomiting: no nausea/no vomiting    Complications: none    Airway Patency: patent    Respiratory: unassisted, spontaneous ventilation, room air    Cardiovascular: stable and blood pressure at baseline    Hydration: euvolemic  "

## 2017-04-03 NOTE — DISCHARGE SUMMARY
Endoscopy Discharge Summary      Admit Date: 4/3/2017    Discharge Date and Time:  4/3/2017 9:43 AM    Attending Physician: Olivier Grayson MD     Discharge Physician: Olivier Grayson MD     Principal Admitting Diagnoses: Hematochezia         Discharge Diagnosis: The primary encounter diagnosis was History of colon polyps. Diagnoses of Hematochezia, Personal history of colonic polyps, Internal hemorrhoids, Radiation proctitis, Diverticulosis of large intestine without hemorrhage, and Benign neoplasm of transverse colon were also pertinent to this visit.     Discharged Condition: Good    Indication for Admission: Hematochezia     Hospital Course: Patient was admitted for an inpatient procedure and tolerated the procedure well with no complications.    Significant Diagnostic Studies: COLONOSCOPY    Pathology (if any):  Specimen (12h ago through future)    Start     Ordered    04/03/17 0940  Specimen to Pathology - Surgery  Once     Comments:  1. Colon polyps X 2    04/03/17 0939          Disposition: Home.    Bleeding: None    No Implants    Follow Up/Patient Instructions:   Current Discharge Medication List      CONTINUE these medications which have NOT CHANGED    Details   hydrochlorothiazide (MICROZIDE) 12.5 mg capsule Take 1 capsule (12.5 mg total) by mouth once daily.  Qty: 30 capsule, Refills: 12      levothyroxine (SYNTHROID) 25 MCG tablet TK 1 T PO QD  Refills: 4      lisinopril-hydrochlorothiazide (PRINZIDE,ZESTORETIC) 10-12.5 mg per tablet Take 1 tablet by mouth once daily.      tamsulosin (FLOMAX) 0.4 mg Cp24 Take 0.4 mg by mouth once daily.               Discharge Procedure Orders  Diet general     Activity as tolerated     Call MD for:  temperature >100.4     Call MD for:  persistent nausea and vomiting     Call MD for:  severe uncontrolled pain     Call MD for:  difficulty breathing, headache or visual disturbances     Call MD for:  redness, tenderness, or signs of infection (pain, swelling, redness, odor  or green/yellow discharge around incision site)     Call MD for:  hives     Call MD for:  persistent dizziness or light-headedness     No dressing needed         Follow-up Information     Follow up with Jalen Pierre MD.    Specialty:  Internal Medicine    Why:  As needed    Contact information:    [address] [facility] [address] [phone]

## 2017-04-03 NOTE — ANESTHESIA POSTPROCEDURE EVALUATION
"Anesthesia Post Evaluation    Patient: David Amaro    Procedure(s) Performed: Procedure(s) (LRB):  COLONOSCOPY (N/A)    Final Anesthesia Type: MAC  Patient location during evaluation: PACU  Patient participation: Yes- Able to Participate  Level of consciousness: awake  Post-procedure vital signs: reviewed and stable  Pain management: adequate  Airway patency: patent  PONV status at discharge: No PONV  Anesthetic complications: no      Cardiovascular status: blood pressure returned to baseline and hemodynamically stable  Respiratory status: unassisted, spontaneous ventilation and room air  Hydration status: euvolemic  Follow-up not needed.        Visit Vitals    BP (!) 155/95 (BP Location: Left arm, Patient Position: Lying, BP Method: Automatic)    Pulse 70    Temp 36.5 °C (97.7 °F) (Oral)    Resp 20    Ht 5' 9" (1.753 m)    Wt 73.9 kg (163 lb)    SpO2 98%    BMI 24.07 kg/m2       Pain/Joy Score: No Data Recorded      "

## 2017-04-03 NOTE — ANESTHESIA PREPROCEDURE EVALUATION
04/03/2017  David Amaro is a 71 y.o., male.    [facility] Anesthesia Evaluation    I have reviewed the Patient Summary Reports.    I have reviewed the Nursing Notes.   I have reviewed the Medications.     Review of Systems  Anesthesia Hx:  No problems with previous Anesthesia  Denies Family Hx of Anesthesia complications.   Denies Personal Hx of Anesthesia complications.   Social:  Non-Smoker, No Alcohol Use    Hematology/Oncology:  Hematology Normal      Oncology Comments: Prostate ca    Cardiovascular:   Hypertension Denies MI.   Denies CABG/stent.      hyperlipidemia ECG has been reviewed. Echo 7/2014:  1 - Normal left ventricular systolic function (EF 55-60%).     2 - Normal left ventricular diastolic function.     3 - Normal right ventricular systolic function .     4 - Trivial to mild pulmonic regurgitation   Pulmonary:   Denies COPD.  Denies Asthma.  Denies Sleep Apnea.    Renal/:  Renal/ Normal     Hepatic/GI:   Bowel Prep. Denies GERD. Denies Liver Disease.  Denies Hepatitis.    Musculoskeletal:  Musculoskeletal Normal    Neurological:   Denies CVA. Denies Seizures.    Endocrine:   Denies Diabetes. Hypothyroidism Denies Hyperthyroidism.        Physical Exam  General:  Well nourished    Airway/Jaw/Neck:  Airway Findings: Mouth Opening: Normal Tongue: Normal  General Airway Assessment: Adult  Mallampati: II      Dental:  Dental Findings: In tact   Chest/Lungs:  Chest/Lungs Findings: Clear to auscultation, Normal Respiratory Rate     Heart/Vascular:  Heart Findings: Rate: Normal  Rhythm: Regular Rhythm  Sounds: Normal             Anesthesia Plan  Type of Anesthesia, risks & benefits discussed:  Anesthesia Type:  MAC  Patient's Preference:   Intra-op Monitoring Plan: standard ASA monitors  Intra-op Monitoring Plan Comments:   Post Op Pain Control Plan:   Post Op Pain Control Plan Comments:    Induction:   IV  Beta Blocker:  Patient is not currently on a Beta-Blocker (No further documentation required).       Informed Consent: Patient understands risks and agrees with Anesthesia plan.  Questions answered. Anesthesia consent signed with patient.  ASA Score: 3     Day of Surgery Review of History & Physical: I have interviewed and examined the patient. I have reviewed the patient's H&P dated:  There are no significant changes.  H&P update referred to the surgeon.         Ready For Surgery From Anesthesia Perspective.

## 2017-04-05 ENCOUNTER — PATIENT MESSAGE (OUTPATIENT)
Dept: GASTROENTEROLOGY | Facility: HOSPITAL | Age: 72
End: 2017-04-05

## 2017-04-05 NOTE — TELEPHONE ENCOUNTER
FINAL PATHOLOGIC DIAGNOSIS  Colon, biopsy:  Fragments of tubular adenoma.    Biopsy results sent to the patient.    arnoldo

## 2018-01-08 ENCOUNTER — PES CALL (OUTPATIENT)
Dept: ADMINISTRATIVE | Facility: CLINIC | Age: 73
End: 2018-01-08

## 2018-01-11 ENCOUNTER — OFFICE VISIT (OUTPATIENT)
Dept: FAMILY MEDICINE | Facility: CLINIC | Age: 73
End: 2018-01-11
Payer: MEDICARE

## 2018-01-11 VITALS
WEIGHT: 167.13 LBS | DIASTOLIC BLOOD PRESSURE: 66 MMHG | TEMPERATURE: 98 F | RESPIRATION RATE: 18 BRPM | BODY MASS INDEX: 24.75 KG/M2 | HEART RATE: 89 BPM | OXYGEN SATURATION: 98 % | SYSTOLIC BLOOD PRESSURE: 100 MMHG | HEIGHT: 69 IN

## 2018-01-11 DIAGNOSIS — M16.11 OSTEOARTHRITIS OF RIGHT HIP, UNSPECIFIED OSTEOARTHRITIS TYPE: ICD-10-CM

## 2018-01-11 DIAGNOSIS — C61 PROSTATE CANCER: ICD-10-CM

## 2018-01-11 DIAGNOSIS — I10 HYPERTENSION, UNSPECIFIED TYPE: Primary | ICD-10-CM

## 2018-01-11 DIAGNOSIS — E03.8 OTHER SPECIFIED HYPOTHYROIDISM: ICD-10-CM

## 2018-01-11 DIAGNOSIS — E78.5 DYSLIPIDEMIA: ICD-10-CM

## 2018-01-11 PROCEDURE — 99999 PR PBB SHADOW E&M-EST. PATIENT-LVL IV: CPT | Mod: PBBFAC,,, | Performed by: INTERNAL MEDICINE

## 2018-01-11 PROCEDURE — 99499 UNLISTED E&M SERVICE: CPT | Mod: S$GLB,,, | Performed by: INTERNAL MEDICINE

## 2018-01-11 PROCEDURE — 99215 OFFICE O/P EST HI 40 MIN: CPT | Mod: S$GLB,,, | Performed by: INTERNAL MEDICINE

## 2018-01-11 RX ORDER — LEVOTHYROXINE SODIUM 25 UG/1
25 TABLET ORAL DAILY
COMMUNITY
End: 2018-12-11 | Stop reason: SDUPTHER

## 2018-01-11 RX ORDER — LISINOPRIL 2.5 MG/1
2.5 TABLET ORAL DAILY
COMMUNITY
End: 2018-05-18

## 2018-01-11 NOTE — PROGRESS NOTES
Subjective:       Patient ID: David Amaro is a 72 y.o. male.    Chief Complaint: Follow-up; Hip Pain; Hypertension; Hyperlipidemia; Hypothyroidism; and Benign Prostatic Hypertrophy    Hip Pain    Pertinent negatives include no numbness.   Hypertension   The problem is controlled. Pertinent negatives include no chest pain, headaches, neck pain, palpitations or shortness of breath.   Hyperlipidemia   Pertinent negatives include no chest pain, myalgias or shortness of breath. Current antihyperlipidemic treatment includes diet change and exercise.   Benign Prostatic Hypertrophy   Irritative symptoms do not include frequency or urgency. Pertinent negatives include no chills, dysuria, hematuria, nausea or vomiting.     Review of Systems   Constitutional: Negative for activity change, appetite change, chills, diaphoresis, fatigue, fever and unexpected weight change.   HENT: Negative for drooling, ear discharge, ear pain, facial swelling, hearing loss, mouth sores, nosebleeds, postnasal drip, rhinorrhea, sinus pressure, sneezing, sore throat, tinnitus, trouble swallowing and voice change.    Eyes: Negative for photophobia, redness and visual disturbance.   Respiratory: Negative for apnea, cough, choking, chest tightness, shortness of breath and wheezing.    Cardiovascular: Negative for chest pain, palpitations and leg swelling.   Gastrointestinal: Negative for abdominal distention, abdominal pain, anal bleeding, blood in stool, constipation, diarrhea, nausea, rectal pain and vomiting.   Endocrine: Negative for cold intolerance, heat intolerance, polydipsia, polyphagia and polyuria.   Genitourinary: Negative for difficulty urinating, dysuria, enuresis, flank pain, frequency, genital sores, hematuria and urgency.   Musculoskeletal: Negative for arthralgias, back pain, gait problem, joint swelling, myalgias, neck pain and neck stiffness.        Right hip/groin pain-positional     Skin: Negative for color change, pallor,  rash and wound.   Allergic/Immunologic: Negative for food allergies and immunocompromised state.   Neurological: Positive for dizziness. Negative for tremors, seizures, syncope, facial asymmetry, speech difficulty, weakness, light-headedness, numbness and headaches.   Hematological: Negative for adenopathy. Does not bruise/bleed easily.   Psychiatric/Behavioral: Negative for agitation, behavioral problems, confusion, decreased concentration, dysphoric mood, hallucinations, self-injury, sleep disturbance and suicidal ideas. The patient is not nervous/anxious and is not hyperactive.        Objective:      Physical Exam   Constitutional: He is oriented to person, place, and time. He appears well-developed and well-nourished. No distress.   HENT:   Head: Normocephalic and atraumatic.   Eyes: Pupils are equal, round, and reactive to light. No scleral icterus.   Neck: Normal range of motion. Neck supple. No JVD present. Carotid bruit is not present. No tracheal deviation present. No thyromegaly present.   Cardiovascular: Normal rate, regular rhythm, normal heart sounds and intact distal pulses.    Pulmonary/Chest: Effort normal and breath sounds normal. No respiratory distress. He has no wheezes. He has no rales. He exhibits no tenderness.   Abdominal: Soft. Bowel sounds are normal. He exhibits no distension. There is no tenderness. There is no rebound and no guarding.   Musculoskeletal: Normal range of motion. He exhibits no edema or tenderness.   Lymphadenopathy:     He has no cervical adenopathy.   Neurological: He is alert and oriented to person, place, and time.   Skin: Skin is warm and dry. No rash noted. He is not diaphoretic. No erythema. No pallor.   Psychiatric: He has a normal mood and affect. His behavior is normal. Judgment and thought content normal.   Nursing note and vitals reviewed.      Assessment:       1. Hypertension, unspecified type    2. Other specified hypothyroidism    3. Dyslipidemia    4.  Prostate cancer    5. Osteoarthritis of right hip, unspecified osteoarthritis type        Plan:        stable-continue meds.              Notes/labs reviewed,        States had recent labs.             ------physical therapy for hip.       He will f/u with rheum for joint pain.             Sees urology dr delvalle, cards dr lawton.                F/u 4 month.

## 2018-01-19 ENCOUNTER — TELEPHONE (OUTPATIENT)
Dept: FAMILY MEDICINE | Facility: CLINIC | Age: 73
End: 2018-01-19

## 2018-01-19 DIAGNOSIS — M19.90 OSTEOARTHRITIS, UNSPECIFIED OSTEOARTHRITIS TYPE, UNSPECIFIED SITE: Primary | ICD-10-CM

## 2018-01-19 NOTE — TELEPHONE ENCOUNTER
----- Message from Amalia Mcgarry sent at 1/19/2018  9:31 AM CST -----  Contact: Patient   Patient stated that he needs a referral to see a Rheumatology at the [facility], Please call him at [phone] or [phone].    Thanks  Td

## 2018-04-02 ENCOUNTER — PES CALL (OUTPATIENT)
Dept: ADMINISTRATIVE | Facility: CLINIC | Age: 73
End: 2018-04-02

## 2018-05-01 ENCOUNTER — PATIENT OUTREACH (OUTPATIENT)
Dept: ADMINISTRATIVE | Facility: HOSPITAL | Age: 73
End: 2018-05-01

## 2018-05-03 ENCOUNTER — PATIENT MESSAGE (OUTPATIENT)
Dept: ADMINISTRATIVE | Facility: HOSPITAL | Age: 73
End: 2018-05-03

## 2018-05-14 ENCOUNTER — OFFICE VISIT (OUTPATIENT)
Dept: FAMILY MEDICINE | Facility: CLINIC | Age: 73
End: 2018-05-14
Payer: MEDICARE

## 2018-05-14 VITALS
OXYGEN SATURATION: 96 % | SYSTOLIC BLOOD PRESSURE: 116 MMHG | DIASTOLIC BLOOD PRESSURE: 70 MMHG | TEMPERATURE: 99 F | RESPIRATION RATE: 18 BRPM | HEART RATE: 73 BPM | HEIGHT: 69 IN | WEIGHT: 170.75 LBS | BODY MASS INDEX: 25.29 KG/M2

## 2018-05-14 DIAGNOSIS — N40.0 BENIGN PROSTATIC HYPERPLASIA, UNSPECIFIED WHETHER LOWER URINARY TRACT SYMPTOMS PRESENT: ICD-10-CM

## 2018-05-14 DIAGNOSIS — E78.5 DYSLIPIDEMIA: ICD-10-CM

## 2018-05-14 DIAGNOSIS — E03.8 OTHER SPECIFIED HYPOTHYROIDISM: ICD-10-CM

## 2018-05-14 DIAGNOSIS — I10 HYPERTENSION, UNSPECIFIED TYPE: Primary | ICD-10-CM

## 2018-05-14 PROCEDURE — 99999 PR PBB SHADOW E&M-EST. PATIENT-LVL IV: CPT | Mod: PBBFAC,,, | Performed by: INTERNAL MEDICINE

## 2018-05-14 PROCEDURE — 99214 OFFICE O/P EST MOD 30 MIN: CPT | Mod: S$GLB,,, | Performed by: INTERNAL MEDICINE

## 2018-05-14 PROCEDURE — 3078F DIAST BP <80 MM HG: CPT | Mod: CPTII,S$GLB,, | Performed by: INTERNAL MEDICINE

## 2018-05-14 PROCEDURE — 99499 UNLISTED E&M SERVICE: CPT | Mod: S$GLB,,, | Performed by: INTERNAL MEDICINE

## 2018-05-14 PROCEDURE — 3074F SYST BP LT 130 MM HG: CPT | Mod: CPTII,S$GLB,, | Performed by: INTERNAL MEDICINE

## 2018-05-14 RX ORDER — IBUPROFEN 200 MG
200 TABLET ORAL
COMMUNITY
End: 2018-05-18

## 2018-05-14 NOTE — PROGRESS NOTES
Subjective:       Patient ID: Daivd Amaro is a 72 y.o. male.    Chief Complaint: Follow-up; Hypertension; Hyperlipidemia; Benign Prostatic Hypertrophy; and Hypothyroidism    Hypertension   This is a chronic problem. The problem is controlled. Pertinent negatives include no chest pain, headaches, neck pain, palpitations or shortness of breath.   Hyperlipidemia   Associated symptoms include myalgias. Pertinent negatives include no chest pain or shortness of breath. Current antihyperlipidemic treatment includes diet change and exercise.   Benign Prostatic Hypertrophy   Irritative symptoms do not include frequency or urgency. Pertinent negatives include no chills, dysuria, hematuria, nausea or vomiting. Past treatments include tamsulosin.     Past Medical History:   Diagnosis Date    Arthritis     Cancer     prostate    Dyslipidemia     HTN (hypertension)     Prostate cancer     Thyroid disease      Past Surgical History:   Procedure Laterality Date    COLONOSCOPY N/A 4/3/2017    Procedure: COLONOSCOPY;  Surgeon: Olivier Grayson MD;  Location: [facility];  Service: Endoscopy;  Laterality: N/A;    HERNIA REPAIR  2009 approx    seed implants in prostate      TONSILLECTOMY       Family History   Problem Relation Age of Onset    Stroke Mother     Cancer Mother     Colon cancer Mother     Diabetes Father     Cancer Sister         lung    Cancer Brother     Bone cancer Brother     Heart disease Maternal Grandmother      Social History     Social History    Marital status:      Spouse name: N/A    Number of children: N/A    Years of education: N/A     Occupational History    Not on file.     Social History Main Topics    Smoking status: Never Smoker    Smokeless tobacco: Never Used    Alcohol use No    Drug use: No    Sexual activity: Yes     Partners: Female     Other Topics Concern    Not on file     Social History Narrative    No narrative on file     Review of Systems    Constitutional: Negative for activity change, appetite change, chills, diaphoresis, fatigue, fever and unexpected weight change.   HENT: Negative for drooling, ear discharge, ear pain, facial swelling, hearing loss, mouth sores, nosebleeds, postnasal drip, rhinorrhea, sinus pressure, sneezing, sore throat, tinnitus, trouble swallowing and voice change.    Eyes: Negative for photophobia, redness and visual disturbance.   Respiratory: Negative for apnea, cough, choking, chest tightness, shortness of breath and wheezing.    Cardiovascular: Negative for chest pain, palpitations and leg swelling.   Gastrointestinal: Negative for abdominal distention, abdominal pain, anal bleeding, blood in stool, constipation, diarrhea, nausea and vomiting.   Endocrine: Negative for cold intolerance, heat intolerance, polydipsia, polyphagia and polyuria.   Genitourinary: Negative for difficulty urinating, dysuria, enuresis, flank pain, frequency, genital sores, hematuria and urgency.   Musculoskeletal: Positive for arthralgias and myalgias. Negative for back pain, gait problem, joint swelling, neck pain and neck stiffness.   Skin: Negative for color change, pallor, rash and wound.   Allergic/Immunologic: Negative for food allergies and immunocompromised state.   Neurological: Negative for dizziness, tremors, seizures, syncope, facial asymmetry, speech difficulty, weakness, light-headedness, numbness and headaches.   Hematological: Negative for adenopathy. Does not bruise/bleed easily.   Psychiatric/Behavioral: Negative for agitation, behavioral problems, confusion, decreased concentration, dysphoric mood, hallucinations, self-injury, sleep disturbance and suicidal ideas. The patient is not nervous/anxious and is not hyperactive.        Objective:      Physical Exam   Constitutional: He is oriented to person, place, and time. He appears well-developed and well-nourished. No distress.   HENT:   Head: Normocephalic and atraumatic.   Eyes:  Pupils are equal, round, and reactive to light. No scleral icterus.   Neck: Normal range of motion. Neck supple. No JVD present. Carotid bruit is not present. No tracheal deviation present. No thyromegaly present.   Cardiovascular: Normal rate, regular rhythm, normal heart sounds and intact distal pulses.    Pulmonary/Chest: Effort normal and breath sounds normal. No respiratory distress. He has no wheezes. He has no rales. He exhibits no tenderness.   Abdominal: Soft. Bowel sounds are normal. He exhibits no distension. There is no tenderness. There is no rebound and no guarding.   Musculoskeletal: Normal range of motion. He exhibits no edema or tenderness.   Lymphadenopathy:     He has no cervical adenopathy.   Neurological: He is alert and oriented to person, place, and time. No cranial nerve deficit. Coordination normal.   Skin: Skin is warm and dry. No rash noted. He is not diaphoretic. No erythema. No pallor.   Psychiatric: He has a normal mood and affect. His behavior is normal. Judgment and thought content normal.   Nursing note and vitals reviewed.      CMP  Sodium   Date Value Ref Range Status   07/14/2014 140 136 - 145 mmol/L Final     Potassium   Date Value Ref Range Status   07/14/2014 4.2 3.5 - 5.1 mmol/L Final     Chloride   Date Value Ref Range Status   07/14/2014 109 95 - 110 mmol/L Final     CO2   Date Value Ref Range Status   07/14/2014 26 23 - 29 mmol/L Final     Glucose   Date Value Ref Range Status   07/14/2014 94 70 - 110 mg/dL Final     BUN, Bld   Date Value Ref Range Status   07/14/2014 17 8 - 23 mg/dL Final     Creatinine   Date Value Ref Range Status   07/14/2014 1.1 0.5 - 1.4 mg/dL Final     Calcium   Date Value Ref Range Status   07/14/2014 8.9 8.7 - 10.5 mg/dL Final     Total Protein   Date Value Ref Range Status   07/14/2014 6.5 6.0 - 8.4 g/dL Final     Albumin   Date Value Ref Range Status   07/14/2014 3.5 3.5 - 5.2 g/dL Final     Total Bilirubin   Date Value Ref Range Status    07/14/2014 0.5 0.1 - 1.0 mg/dL Final     Comment:     For infants and newborns, interpretation of results should be based  on gestational age, weight and in agreement with clinical  observations.  Premature Infant recommended reference ranges:  Up to 24 hours.............<8.0 mg/dL  Up to 48 hours............<12.0 mg/dL  3-5 days..................<15.0 mg/dL  6-29 days.................<15.0 mg/dL       Alkaline Phosphatase   Date Value Ref Range Status   07/14/2014 90 55 - 135 U/L Final     AST   Date Value Ref Range Status   07/14/2014 17 10 - 40 U/L Final     ALT   Date Value Ref Range Status   07/14/2014 17 10 - 44 U/L Final     Anion Gap   Date Value Ref Range Status   07/14/2014 5 (L) 8 - 16 mmol/L Final     eGFR if    Date Value Ref Range Status   07/14/2014 >60.0 >60 mL/min/1.73 m^2 Final     eGFR if non    Date Value Ref Range Status   07/14/2014 >60.0 >60 mL/min/1.73 m^2 Final     Comment:     Calculation used to obtain the estimated glomerular filtration  rate (eGFR) is the CKD-EPI equation. Since race is unknown   in our information system, the eGFR values for   -American and Non--American patients are given   for each creatinine result.       Lab Results   Component Value Date    WBC 5.77 07/14/2014    HGB 13.1 (L) 07/14/2014    HCT 41.4 07/14/2014    MCV 92 07/14/2014     07/14/2014     Lab Results   Component Value Date    CHOL 198 07/14/2014     Lab Results   Component Value Date    HDL 70 07/14/2014     Lab Results   Component Value Date    LDLCALC 121.0 07/14/2014     Lab Results   Component Value Date    TRIG 35 07/14/2014     Lab Results   Component Value Date    CHOLHDL 35.4 07/14/2014     Lab Results   Component Value Date    TSH 2.900 07/14/2014     Lab Results   Component Value Date    HGBA1C 5.7 12/20/2005     Assessment:       1. Hypertension, unspecified type    2. Dyslipidemia    3. Other specified hypothyroidism    4. Benign prostatic  hyperplasia, unspecified whether lower urinary tract symptoms present        Plan:   Hypertension, unspecified type----controlled.  -     Hepatitis C antibody; Future; Expected date: 05/14/2018  -     Comprehensive metabolic panel; Future; Expected date: 05/14/2018  -     CBC auto differential; Future; Expected date: 05/14/2018  -     TSH; Future; Expected date: 05/14/2018  -     Uric acid; Future; Expected date: 05/14/2018    Dyslipidemia  -     Comprehensive metabolic panel; Future; Expected date: 05/14/2018  -     CBC auto differential; Future; Expected date: 05/14/2018  -     Lipid panel; Future; Expected date: 05/14/2018    Other specified hypothyroidism    Benign prostatic hyperplasia, unspecified whether lower urinary tract symptoms present---------------sees urology dr delvalle.       F/u 6 months.

## 2018-05-16 ENCOUNTER — APPOINTMENT (OUTPATIENT)
Dept: LAB | Facility: HOSPITAL | Age: 73
End: 2018-05-16
Attending: FAMILY MEDICINE
Payer: MEDICARE

## 2018-05-17 ENCOUNTER — TELEPHONE (OUTPATIENT)
Dept: FAMILY MEDICINE | Facility: CLINIC | Age: 73
End: 2018-05-17

## 2018-05-17 DIAGNOSIS — N28.9 RENAL INSUFFICIENCY: Primary | ICD-10-CM

## 2018-05-18 ENCOUNTER — OFFICE VISIT (OUTPATIENT)
Dept: NEPHROLOGY | Facility: CLINIC | Age: 73
End: 2018-05-18
Payer: MEDICARE

## 2018-05-18 VITALS
SYSTOLIC BLOOD PRESSURE: 122 MMHG | DIASTOLIC BLOOD PRESSURE: 70 MMHG | HEIGHT: 69 IN | WEIGHT: 169.75 LBS | HEART RATE: 72 BPM | BODY MASS INDEX: 25.14 KG/M2

## 2018-05-18 DIAGNOSIS — N17.9 AKI (ACUTE KIDNEY INJURY): Primary | ICD-10-CM

## 2018-05-18 PROCEDURE — 99499 UNLISTED E&M SERVICE: CPT | Mod: S$GLB,,, | Performed by: INTERNAL MEDICINE

## 2018-05-18 PROCEDURE — 3074F SYST BP LT 130 MM HG: CPT | Mod: CPTII,S$GLB,, | Performed by: INTERNAL MEDICINE

## 2018-05-18 PROCEDURE — 99203 OFFICE O/P NEW LOW 30 MIN: CPT | Mod: S$GLB,,, | Performed by: INTERNAL MEDICINE

## 2018-05-18 PROCEDURE — 99999 PR PBB SHADOW E&M-EST. PATIENT-LVL III: CPT | Mod: PBBFAC,,, | Performed by: INTERNAL MEDICINE

## 2018-05-18 PROCEDURE — 3078F DIAST BP <80 MM HG: CPT | Mod: CPTII,S$GLB,, | Performed by: INTERNAL MEDICINE

## 2018-05-18 NOTE — PATIENT INSTRUCTIONS
Drink plenty of fluids to make urine look like water     Avoid nonsteroidals; no Advil or Motrin; okay to take Tylenol for pain; if needs stronger pain medications please let us know

## 2018-05-18 NOTE — PROGRESS NOTES
"Subjective:       Patient ID: David Amaro is a 72 y.o. Black or  male who presents for new evaluation of Acute Renal Failure    HPI     Patient is a 72-year-old male with history of rising creatinine recently from a baseline of 1.1 up to 1.8.  Patient has had left elbow arthritis status post rheumatology evaluation.  Patient had been taking Advil twice a day for inflammation of the left elbow.  He used Advil for about a month.  He also has been taking Zestril 2.5 mg for blood pressure control.  Patient has been working in the [employer] and has had episodes of dehydration with hypotension.    May 2018 seen for acute kidney injury:  Stop all nonsteroidals.  Oral hydration.  Stop Zestril.    Review of Systems   Constitutional: Negative.  Negative for activity change, appetite change, chills, diaphoresis, fatigue and fever.   HENT: Negative.  Negative for congestion and trouble swallowing.    Eyes: Negative.    Respiratory: Negative.  Negative for cough, chest tightness, shortness of breath and wheezing.    Cardiovascular: Negative.  Negative for chest pain, palpitations and leg swelling.   Gastrointestinal: Negative.  Negative for abdominal distention, abdominal pain, nausea and vomiting.   Genitourinary: Negative.  Negative for decreased urine volume, difficulty urinating, dysuria, enuresis, flank pain, frequency, hematuria, penile swelling, scrotal swelling and urgency.   Musculoskeletal: Negative.  Negative for arthralgias, back pain, joint swelling and neck pain.   Skin: Negative for rash.   Neurological: Negative.  Negative for tremors, seizures and headaches.   Psychiatric/Behavioral: Negative.  Negative for confusion and sleep disturbance. The patient is not nervous/anxious.    All other systems reviewed and are negative.      Objective:   /70   Pulse 72   Ht 5' 9" (1.753 m)   Wt 77 kg (169 lb 12.1 oz)   BMI 25.07 kg/m²      Physical Exam   Constitutional: He is oriented to person, " place, and time. He appears well-developed and well-nourished. No distress.   HENT:   Head: Normocephalic.   Eyes: EOM are normal. Pupils are equal, round, and reactive to light.   Neck: Normal range of motion. Neck supple. No JVD present. No thyromegaly present.   Cardiovascular: Normal rate, regular rhythm, S1 normal, S2 normal, normal heart sounds and intact distal pulses.  PMI is not displaced.  Exam reveals no gallop and no friction rub.    No murmur heard.  Pulmonary/Chest: Effort normal and breath sounds normal. No respiratory distress. He has no wheezes. He has no rales. He exhibits no tenderness.   Abdominal: He exhibits no distension and no mass. There is no hepatosplenomegaly. There is no tenderness. There is no rebound and no CVA tenderness. No hernia.   Musculoskeletal: Normal range of motion. He exhibits no edema or tenderness.   Lymphadenopathy:     He has no cervical adenopathy.   Neurological: He is alert and oriented to person, place, and time. He has normal reflexes. He is not disoriented. He displays normal reflexes. No cranial nerve deficit. He exhibits normal muscle tone. Coordination normal.   Skin: Skin is warm and dry. No rash noted. No erythema.   Psychiatric: He has a normal mood and affect. His behavior is normal. Judgment and thought content normal.         Lab Results   Component Value Date    CREATININE 1.8 (H) 05/16/2018    BUN 24 (H) 05/16/2018     05/16/2018    K 4.7 05/16/2018     05/16/2018    CO2 26 05/16/2018     Lab Results   Component Value Date    WBC 8.55 05/16/2018    HGB 12.9 (L) 05/16/2018    HCT 41.9 05/16/2018    MCV 97 05/16/2018     05/16/2018     Lab Results   Component Value Date    CALCIUM 9.6 05/16/2018     @RESUFAST(URICACID)    Assessment:    )    1. SKINNY (acute kidney injury)        Plan:         acute kidney injury most likely prerenal in etiology.  Stop Advil.  Aggressive oral hydration.  Stop Zestril.  Recheck labs next week.    Follow-up  in 4 weeks

## 2018-05-25 ENCOUNTER — OFFICE VISIT (OUTPATIENT)
Dept: INTERNAL MEDICINE | Facility: CLINIC | Age: 73
End: 2018-05-25
Payer: MEDICARE

## 2018-05-25 VITALS
SYSTOLIC BLOOD PRESSURE: 128 MMHG | TEMPERATURE: 96 F | HEIGHT: 69 IN | BODY MASS INDEX: 24.85 KG/M2 | DIASTOLIC BLOOD PRESSURE: 78 MMHG | WEIGHT: 167.75 LBS

## 2018-05-25 DIAGNOSIS — K57.30 DIVERTICULOSIS OF LARGE INTESTINE WITHOUT HEMORRHAGE: Chronic | ICD-10-CM

## 2018-05-25 DIAGNOSIS — R10.32 LEFT LOWER QUADRANT PAIN: Primary | ICD-10-CM

## 2018-05-25 PROCEDURE — 99999 PR PBB SHADOW E&M-EST. PATIENT-LVL III: CPT | Mod: PBBFAC,,, | Performed by: NURSE PRACTITIONER

## 2018-05-25 PROCEDURE — 99211 OFF/OP EST MAY X REQ PHY/QHP: CPT | Mod: S$GLB,,, | Performed by: NURSE PRACTITIONER

## 2018-05-25 PROCEDURE — 3074F SYST BP LT 130 MM HG: CPT | Mod: CPTII,S$GLB,, | Performed by: NURSE PRACTITIONER

## 2018-05-25 PROCEDURE — 3078F DIAST BP <80 MM HG: CPT | Mod: CPTII,S$GLB,, | Performed by: NURSE PRACTITIONER

## 2018-05-25 RX ORDER — LISINOPRIL 2.5 MG/1
2.5 TABLET ORAL DAILY
COMMUNITY
End: 2018-06-21

## 2018-05-25 NOTE — PROGRESS NOTES
Subjective:       Patient ID: David Amaro is a 72 y.o. male.    Chief Complaint: No chief complaint on file.    Patient presents with left abdominal pain that radiates to back that started this morning.  Had some nausea and belching.  No vomiting.  Last bowel movement-yesterday (normal).   Rates pain 9/10 and describes at aching.  Has diverticulosis.  Denies history of diverticulitis.  Denies eating seed/nuts other than strawberries on yesterday.       Review of Systems   Constitutional: Negative for chills and fatigue.   Gastrointestinal: Positive for abdominal pain and nausea. Negative for constipation, diarrhea and vomiting.   Musculoskeletal: Positive for back pain.   Skin: Negative for color change and rash.   Psychiatric/Behavioral: Negative for agitation and confusion.       Objective:      Physical Exam   Constitutional: He is oriented to person, place, and time. Vital signs are normal. He appears well-developed and well-nourished.   HENT:   Head: Normocephalic and atraumatic.   Neck: Normal range of motion.   Cardiovascular: Normal rate and regular rhythm.    Pulmonary/Chest: Effort normal and breath sounds normal.   Abdominal: Soft. Bowel sounds are normal. There is tenderness in the left lower quadrant.   Musculoskeletal: He exhibits tenderness.   Neurological: He is alert and oriented to person, place, and time.   Skin: Skin is warm.   Psychiatric: He has a normal mood and affect.       Assessment:       1. Left lower quadrant pain    2. Diverticulosis of large intestine without hemorrhage        Plan:         Left lower quadrant pain  -     Cancel: X-Ray Abdomen Flat And Erect; Future; Expected date: 05/25/2018  -     Cancel: CT Abdomen Without Contrast; Future; Expected date: 05/25/2018    Diverticulosis of large intestine without hemorrhage        Due to patient's pain and possible evaluation needed, advised to go to ER.  Verbalized understanding.  Reports he will go to [facility].   Stable for transport.

## 2018-05-28 ENCOUNTER — TELEPHONE (OUTPATIENT)
Dept: NEPHROLOGY | Facility: CLINIC | Age: 73
End: 2018-05-28

## 2018-05-28 NOTE — TELEPHONE ENCOUNTER
----- Message from Mei Rojas sent at 5/25/2018  3:43 PM CDT -----  Contact: patient  States he had lab work done in ER. No need to get done today. Please call patient if need @ [phone]. Thanks, elizabeth

## 2018-05-28 NOTE — TELEPHONE ENCOUNTER
Returned call to patient. Advised that I will call and see if [facility] will fax the labs to me if not he will need to reschedule to come in for lab work. He expressed understanding.

## 2018-06-14 ENCOUNTER — LAB VISIT (OUTPATIENT)
Dept: LAB | Facility: HOSPITAL | Age: 73
End: 2018-06-14
Attending: INTERNAL MEDICINE
Payer: MEDICARE

## 2018-06-14 DIAGNOSIS — N17.9 AKI (ACUTE KIDNEY INJURY): ICD-10-CM

## 2018-06-14 LAB
ALBUMIN SERPL BCP-MCNC: 3.7 G/DL
ANION GAP SERPL CALC-SCNC: 7 MMOL/L
BASOPHILS # BLD AUTO: 0.05 K/UL
BASOPHILS NFR BLD: 1 %
BUN SERPL-MCNC: 24 MG/DL
CALCIUM SERPL-MCNC: 9.2 MG/DL
CHLORIDE SERPL-SCNC: 109 MMOL/L
CO2 SERPL-SCNC: 26 MMOL/L
CREAT SERPL-MCNC: 1.5 MG/DL
DIFFERENTIAL METHOD: ABNORMAL
EOSINOPHIL # BLD AUTO: 0 K/UL
EOSINOPHIL NFR BLD: 0.8 %
ERYTHROCYTE [DISTWIDTH] IN BLOOD BY AUTOMATED COUNT: 12.3 %
EST. GFR  (AFRICAN AMERICAN): 53.4 ML/MIN/1.73 M^2
EST. GFR  (NON AFRICAN AMERICAN): 46.2 ML/MIN/1.73 M^2
GLUCOSE SERPL-MCNC: 88 MG/DL
HCT VFR BLD AUTO: 37.7 %
HGB BLD-MCNC: 11.7 G/DL
IMM GRANULOCYTES # BLD AUTO: 0.02 K/UL
IMM GRANULOCYTES NFR BLD AUTO: 0.4 %
LYMPHOCYTES # BLD AUTO: 1.2 K/UL
LYMPHOCYTES NFR BLD: 23.9 %
MCH RBC QN AUTO: 29.7 PG
MCHC RBC AUTO-ENTMCNC: 31 G/DL
MCV RBC AUTO: 96 FL
MONOCYTES # BLD AUTO: 0.4 K/UL
MONOCYTES NFR BLD: 8.1 %
NEUTROPHILS # BLD AUTO: 3.4 K/UL
NEUTROPHILS NFR BLD: 65.8 %
NRBC BLD-RTO: 0 /100 WBC
PHOSPHATE SERPL-MCNC: 3 MG/DL
PLATELET # BLD AUTO: 255 K/UL
PMV BLD AUTO: 10.2 FL
POTASSIUM SERPL-SCNC: 4.4 MMOL/L
RBC # BLD AUTO: 3.94 M/UL
SODIUM SERPL-SCNC: 142 MMOL/L
WBC # BLD AUTO: 5.18 K/UL

## 2018-06-14 PROCEDURE — 80069 RENAL FUNCTION PANEL: CPT

## 2018-06-14 PROCEDURE — 36415 COLL VENOUS BLD VENIPUNCTURE: CPT | Mod: PO

## 2018-06-14 PROCEDURE — 85025 COMPLETE CBC W/AUTO DIFF WBC: CPT

## 2018-06-21 ENCOUNTER — OFFICE VISIT (OUTPATIENT)
Dept: NEPHROLOGY | Facility: CLINIC | Age: 73
End: 2018-06-21
Payer: MEDICARE

## 2018-06-21 ENCOUNTER — PATIENT MESSAGE (OUTPATIENT)
Dept: NEPHROLOGY | Facility: CLINIC | Age: 73
End: 2018-06-21

## 2018-06-21 VITALS
DIASTOLIC BLOOD PRESSURE: 89 MMHG | HEART RATE: 76 BPM | SYSTOLIC BLOOD PRESSURE: 158 MMHG | HEIGHT: 69 IN | BODY MASS INDEX: 25.11 KG/M2 | WEIGHT: 169.56 LBS

## 2018-06-21 DIAGNOSIS — N20.0 KIDNEY STONE ON LEFT SIDE: ICD-10-CM

## 2018-06-21 DIAGNOSIS — I10 ESSENTIAL HYPERTENSION: ICD-10-CM

## 2018-06-21 DIAGNOSIS — N40.0 BENIGN PROSTATIC HYPERPLASIA, UNSPECIFIED WHETHER LOWER URINARY TRACT SYMPTOMS PRESENT: ICD-10-CM

## 2018-06-21 DIAGNOSIS — N18.31 CHRONIC KIDNEY DISEASE (CKD) STAGE G3A/A1, MODERATELY DECREASED GLOMERULAR FILTRATION RATE (GFR) BETWEEN 45-59 ML/MIN/1.73 SQUARE METER AND ALBUMINURIA CREATININE RATIO LESS THAN 30 MG/G: ICD-10-CM

## 2018-06-21 DIAGNOSIS — N13.30 HYDRONEPHROSIS OF LEFT KIDNEY: ICD-10-CM

## 2018-06-21 DIAGNOSIS — N17.9 AKI (ACUTE KIDNEY INJURY): Primary | ICD-10-CM

## 2018-06-21 PROCEDURE — 99499 UNLISTED E&M SERVICE: CPT | Mod: S$GLB,,, | Performed by: INTERNAL MEDICINE

## 2018-06-21 PROCEDURE — 3077F SYST BP >= 140 MM HG: CPT | Mod: CPTII,S$GLB,, | Performed by: INTERNAL MEDICINE

## 2018-06-21 PROCEDURE — 3079F DIAST BP 80-89 MM HG: CPT | Mod: CPTII,S$GLB,, | Performed by: INTERNAL MEDICINE

## 2018-06-21 PROCEDURE — 99214 OFFICE O/P EST MOD 30 MIN: CPT | Mod: S$GLB,,, | Performed by: INTERNAL MEDICINE

## 2018-06-21 PROCEDURE — 99999 PR PBB SHADOW E&M-EST. PATIENT-LVL III: CPT | Mod: PBBFAC,,, | Performed by: INTERNAL MEDICINE

## 2018-06-21 RX ORDER — DOXAZOSIN 4 MG/1
4 TABLET ORAL NIGHTLY
Qty: 30 TABLET | Refills: 11 | Status: SHIPPED | OUTPATIENT
Start: 2018-06-21 | End: 2019-06-20 | Stop reason: SDUPTHER

## 2018-06-21 NOTE — PROGRESS NOTES
Subjective:       Patient ID: David Amaro is a 73 y.o. Black or  male who presents for new evaluation of Acute Renal Failure; Chronic Kidney Disease; and Hypertension    Hypertension   Pertinent negatives include no chest pain, headaches, neck pain, palpitations or shortness of breath.        Patient is a 72-year-old male with history of rising creatinine recently from a baseline of 1.1 up to 1.8.  Patient has had left elbow arthritis status post rheumatology evaluation.  Patient had been taking Advil twice a day for inflammation of the left elbow.  He used Advil for about a month.  He also has been taking Zestril 2.5 mg for blood pressure control.  Patient has been working in the [employer] and has had episodes of dehydration with hypotension.    May 2018 seen for acute kidney injury:  Stop all nonsteroidals.  Oral hydration.  Stop Zestril.    Patient had emergency room with it in bed and [facility] on 5/25/2018 patient had presented with abdominal pain nausea vomiting abdominal pain and had a CT scan of the abdomen showing Right inguinal hernia containing mostly fat, and a small portion of the cecal tip. Diverticulosis coli. Right kidney: No calculi. No hydronephrosis. Left kidney: Left-sided hydronephrosis and hydroureter, secondary to a 2 mm calculus in the proximal third of the left ureter, axial image 65, coronal image 45. Left renal edema and perinephric fat stranding. No intrarenal calculi. 1 cm cortical cyst present in the posterior lower pole region. Urinary bladder appears unremarkable. Radiation seed implants in the prostate bed region.       June 2018 patient has improved on his creatinine with the creatinine down to 1.5.  No heavy proteinuria. Pending follow up with dr. Da Velez for stone and Prostate ca. Hasp passed the stone     Review of Systems   Constitutional: Negative.  Negative for activity change, appetite change, chills, diaphoresis, fatigue and fever.   HENT:  "Negative.  Negative for congestion and trouble swallowing.    Eyes: Negative.    Respiratory: Negative.  Negative for cough, chest tightness, shortness of breath and wheezing.    Cardiovascular: Negative.  Negative for chest pain, palpitations and leg swelling.   Gastrointestinal: Negative.  Negative for abdominal distention, abdominal pain, nausea and vomiting.   Genitourinary: Negative.  Negative for decreased urine volume, difficulty urinating, dysuria, enuresis, flank pain, frequency, hematuria, penile swelling, scrotal swelling and urgency.   Musculoskeletal: Negative.  Negative for arthralgias, back pain, joint swelling and neck pain.   Skin: Negative for rash.   Neurological: Negative.  Negative for tremors, seizures and headaches.   Psychiatric/Behavioral: Negative.  Negative for confusion and sleep disturbance. The patient is not nervous/anxious.    All other systems reviewed and are negative.      Objective:   BP (!) 158/89   Pulse 76   Ht 5' 9" (1.753 m)   Wt 76.9 kg (169 lb 8.5 oz)   BMI 25.04 kg/m²      Physical Exam   Constitutional: He is oriented to person, place, and time. He appears well-developed and well-nourished. No distress.   HENT:   Head: Normocephalic.   Eyes: EOM are normal. Pupils are equal, round, and reactive to light.   Neck: Normal range of motion. Neck supple. No JVD present. No thyromegaly present.   Cardiovascular: Normal rate, regular rhythm, S1 normal, S2 normal, normal heart sounds and intact distal pulses.  PMI is not displaced.  Exam reveals no gallop and no friction rub.    No murmur heard.  Pulmonary/Chest: Effort normal and breath sounds normal. No respiratory distress. He has no wheezes. He has no rales. He exhibits no tenderness.   Abdominal: He exhibits no distension and no mass. There is no hepatosplenomegaly. There is no tenderness. There is no rebound and no CVA tenderness. No hernia.   Musculoskeletal: Normal range of motion. He exhibits no edema or tenderness. "   Lymphadenopathy:     He has no cervical adenopathy.   Neurological: He is alert and oriented to person, place, and time. He has normal reflexes. He is not disoriented. He displays normal reflexes. No cranial nerve deficit. He exhibits normal muscle tone. Coordination normal.   Skin: Skin is warm and dry. No rash noted. No erythema.   Psychiatric: He has a normal mood and affect. His behavior is normal. Judgment and thought content normal.         Lab Results   Component Value Date    CREATININE 1.5 (H) 06/14/2018    BUN 24 (H) 06/14/2018     06/14/2018    K 4.4 06/14/2018     06/14/2018    CO2 26 06/14/2018     Lab Results   Component Value Date    WBC 5.18 06/14/2018    HGB 11.7 (L) 06/14/2018    HCT 37.7 (L) 06/14/2018    MCV 96 06/14/2018     06/14/2018     Lab Results   Component Value Date    CALCIUM 9.2 06/14/2018    PHOS 3.0 06/14/2018     @RESUFAST(URICACID)    Assessment:    )    1. SKINNY (acute kidney injury)    2. Chronic kidney disease (CKD) stage G3a/A1, moderately decreased glomerular filtration rate (GFR) between 45-59 mL/min/1.73 square meter and albuminuria creatinine ratio less than 30 mg/g    3. Essential hypertension    4. Benign prostatic hyperplasia, unspecified whether lower urinary tract symptoms present    5. Hydronephrosis of left kidney    6. Kidney stone on left side        Plan:           1.  Acute kidney injury likely from dehydration.  Much improved after oral hydration.  Also has left-sided kidney stone with hydronephrosis degree of hydronephrosis not known at this time. D/c ACEI for ever     2.  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  Check Cystatin C on follow-up also screen for vitamin-D deficiency and hyperparathyroidism on follow-up.  Urology to follow up on the left-sided hydronephrosis.    3.  Essential hypertension:  Add Cardura at bedtime.    4.  BPH on Flomax    5. Dr. Velez to see for  prostate issues

## 2018-06-21 NOTE — PATIENT INSTRUCTIONS
1.  Acute kidney injury likely from dehydration.  Much improved after oral hydration.  Also has left-sided kidney stone with hydronephrosis degree of hydronephrosis not known at this time. D/c ACEI for ever     2.  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  Check Cystatin C on follow-up also screen for vitamin-D deficiency and hyperparathyroidism on follow-up.  Urology to follow up on the left-sided hydronephrosis.    3.  Essential hypertension:  Add Cardura at bedtime.    4.  BPH on Flomax    5. Dr. Velez to see for prostate issues

## 2018-06-22 ENCOUNTER — TELEPHONE (OUTPATIENT)
Dept: RADIOLOGY | Facility: HOSPITAL | Age: 73
End: 2018-06-22

## 2018-06-22 NOTE — TELEPHONE ENCOUNTER
S/W agent at [employer] regarding p/a for flomax.  Agent states he will fax over p/a.  VB

## 2018-06-25 ENCOUNTER — HOSPITAL ENCOUNTER (OUTPATIENT)
Dept: RADIOLOGY | Facility: HOSPITAL | Age: 73
Discharge: HOME | End: 2018-06-25
Attending: INTERNAL MEDICINE
Payer: MEDICARE

## 2018-06-25 DIAGNOSIS — N13.30 HYDRONEPHROSIS OF LEFT KIDNEY: ICD-10-CM

## 2018-06-25 DIAGNOSIS — N17.9 AKI (ACUTE KIDNEY INJURY): ICD-10-CM

## 2018-06-25 DIAGNOSIS — N20.0 KIDNEY STONE ON LEFT SIDE: ICD-10-CM

## 2018-06-25 DIAGNOSIS — N40.0 BENIGN PROSTATIC HYPERPLASIA, UNSPECIFIED WHETHER LOWER URINARY TRACT SYMPTOMS PRESENT: ICD-10-CM

## 2018-06-25 DIAGNOSIS — N18.31 CHRONIC KIDNEY DISEASE (CKD) STAGE G3A/A1, MODERATELY DECREASED GLOMERULAR FILTRATION RATE (GFR) BETWEEN 45-59 ML/MIN/1.73 SQUARE METER AND ALBUMINURIA CREATININE RATIO LESS THAN 30 MG/G: ICD-10-CM

## 2018-06-25 DIAGNOSIS — I10 ESSENTIAL HYPERTENSION: ICD-10-CM

## 2018-06-25 PROCEDURE — 76770 US EXAM ABDO BACK WALL COMP: CPT | Mod: TC,PO

## 2018-06-25 PROCEDURE — 76770 US EXAM ABDO BACK WALL COMP: CPT | Mod: 26,,, | Performed by: RADIOLOGY

## 2018-06-25 NOTE — PROGRESS NOTES
Ultrasound has been reviewed.  Results have been consistent with patient's profile.  No acute action has to be taken as of now.  Further discussion on the test results will be done at the time of the clinic appointment.  If patient desires any further questions please let me know at this time

## 2018-07-20 ENCOUNTER — DOCUMENTATION ONLY (OUTPATIENT)
Dept: NEPHROLOGY | Facility: CLINIC | Age: 73
End: 2018-07-20

## 2018-08-06 ENCOUNTER — PES CALL (OUTPATIENT)
Dept: ADMINISTRATIVE | Facility: CLINIC | Age: 73
End: 2018-08-06

## 2018-08-07 ENCOUNTER — LAB VISIT (OUTPATIENT)
Dept: LAB | Facility: HOSPITAL | Age: 73
End: 2018-08-07
Attending: INTERNAL MEDICINE
Payer: MEDICARE

## 2018-08-07 DIAGNOSIS — N20.0 KIDNEY STONE ON LEFT SIDE: ICD-10-CM

## 2018-08-07 DIAGNOSIS — N18.31 CHRONIC KIDNEY DISEASE (CKD) STAGE G3A/A1, MODERATELY DECREASED GLOMERULAR FILTRATION RATE (GFR) BETWEEN 45-59 ML/MIN/1.73 SQUARE METER AND ALBUMINURIA CREATININE RATIO LESS THAN 30 MG/G: ICD-10-CM

## 2018-08-07 DIAGNOSIS — N13.30 HYDRONEPHROSIS OF LEFT KIDNEY: ICD-10-CM

## 2018-08-07 DIAGNOSIS — N17.9 AKI (ACUTE KIDNEY INJURY): ICD-10-CM

## 2018-08-07 DIAGNOSIS — N40.0 BENIGN PROSTATIC HYPERPLASIA, UNSPECIFIED WHETHER LOWER URINARY TRACT SYMPTOMS PRESENT: ICD-10-CM

## 2018-08-07 DIAGNOSIS — I10 ESSENTIAL HYPERTENSION: ICD-10-CM

## 2018-08-07 LAB
25(OH)D3+25(OH)D2 SERPL-MCNC: 23 NG/ML
ALBUMIN SERPL BCP-MCNC: 3.6 G/DL
ANION GAP SERPL CALC-SCNC: 6 MMOL/L
BASOPHILS # BLD AUTO: 0.05 K/UL
BASOPHILS NFR BLD: 1 %
BUN SERPL-MCNC: 25 MG/DL
CALCIUM SERPL-MCNC: 9 MG/DL
CHLORIDE SERPL-SCNC: 108 MMOL/L
CO2 SERPL-SCNC: 25 MMOL/L
CREAT SERPL-MCNC: 1.5 MG/DL
DIFFERENTIAL METHOD: ABNORMAL
EOSINOPHIL # BLD AUTO: 0.1 K/UL
EOSINOPHIL NFR BLD: 1.6 %
ERYTHROCYTE [DISTWIDTH] IN BLOOD BY AUTOMATED COUNT: 13 %
EST. GFR  (AFRICAN AMERICAN): 53 ML/MIN/1.73 M^2
EST. GFR  (NON AFRICAN AMERICAN): 45.9 ML/MIN/1.73 M^2
GLUCOSE SERPL-MCNC: 87 MG/DL
HCT VFR BLD AUTO: 36.8 %
HGB BLD-MCNC: 11.4 G/DL
IMM GRANULOCYTES # BLD AUTO: 0.01 K/UL
IMM GRANULOCYTES NFR BLD AUTO: 0.2 %
LYMPHOCYTES # BLD AUTO: 1.1 K/UL
LYMPHOCYTES NFR BLD: 20.7 %
MCH RBC QN AUTO: 29.4 PG
MCHC RBC AUTO-ENTMCNC: 31 G/DL
MCV RBC AUTO: 95 FL
MONOCYTES # BLD AUTO: 0.4 K/UL
MONOCYTES NFR BLD: 7.6 %
NEUTROPHILS # BLD AUTO: 3.5 K/UL
NEUTROPHILS NFR BLD: 68.9 %
NRBC BLD-RTO: 0 /100 WBC
PHOSPHATE SERPL-MCNC: 2.9 MG/DL
PLATELET # BLD AUTO: 213 K/UL
PMV BLD AUTO: 10.2 FL
POTASSIUM SERPL-SCNC: 4.4 MMOL/L
PTH-INTACT SERPL-MCNC: 103 PG/ML
RBC # BLD AUTO: 3.88 M/UL
SODIUM SERPL-SCNC: 139 MMOL/L
WBC # BLD AUTO: 5.13 K/UL

## 2018-08-07 PROCEDURE — 82306 VITAMIN D 25 HYDROXY: CPT

## 2018-08-07 PROCEDURE — 85025 COMPLETE CBC W/AUTO DIFF WBC: CPT

## 2018-08-07 PROCEDURE — 82610 CYSTATIN C: CPT

## 2018-08-07 PROCEDURE — 83970 ASSAY OF PARATHORMONE: CPT

## 2018-08-07 PROCEDURE — 36415 COLL VENOUS BLD VENIPUNCTURE: CPT

## 2018-08-07 PROCEDURE — 80069 RENAL FUNCTION PANEL: CPT

## 2018-08-09 LAB
CYSTATIN C SERPL-MCNC: 1.18 MG/L
GFR/BSA.PRED SERPLBLD CYS-BASED-ARV: 59 ML/MIN/BSA

## 2018-08-14 ENCOUNTER — OFFICE VISIT (OUTPATIENT)
Dept: NEPHROLOGY | Facility: CLINIC | Age: 73
End: 2018-08-14
Payer: MEDICARE

## 2018-08-14 VITALS
WEIGHT: 167.75 LBS | HEIGHT: 69 IN | DIASTOLIC BLOOD PRESSURE: 84 MMHG | SYSTOLIC BLOOD PRESSURE: 142 MMHG | HEART RATE: 62 BPM | BODY MASS INDEX: 24.85 KG/M2

## 2018-08-14 DIAGNOSIS — N20.0 KIDNEY STONE ON LEFT SIDE: ICD-10-CM

## 2018-08-14 DIAGNOSIS — N18.31 CHRONIC KIDNEY DISEASE (CKD) STAGE G3A/A1, MODERATELY DECREASED GLOMERULAR FILTRATION RATE (GFR) BETWEEN 45-59 ML/MIN/1.73 SQUARE METER AND ALBUMINURIA CREATININE RATIO LESS THAN 30 MG/G: Primary | ICD-10-CM

## 2018-08-14 DIAGNOSIS — I10 ESSENTIAL HYPERTENSION: ICD-10-CM

## 2018-08-14 DIAGNOSIS — N13.30 HYDRONEPHROSIS OF LEFT KIDNEY: ICD-10-CM

## 2018-08-14 DIAGNOSIS — N40.0 BENIGN PROSTATIC HYPERPLASIA, UNSPECIFIED WHETHER LOWER URINARY TRACT SYMPTOMS PRESENT: ICD-10-CM

## 2018-08-14 PROCEDURE — 3077F SYST BP >= 140 MM HG: CPT | Mod: CPTII,S$GLB,, | Performed by: INTERNAL MEDICINE

## 2018-08-14 PROCEDURE — 3079F DIAST BP 80-89 MM HG: CPT | Mod: CPTII,S$GLB,, | Performed by: INTERNAL MEDICINE

## 2018-08-14 PROCEDURE — 99499 UNLISTED E&M SERVICE: CPT | Mod: HCNC,S$GLB,, | Performed by: INTERNAL MEDICINE

## 2018-08-14 PROCEDURE — 99999 PR PBB SHADOW E&M-EST. PATIENT-LVL III: CPT | Mod: PBBFAC,,, | Performed by: INTERNAL MEDICINE

## 2018-08-14 PROCEDURE — 99214 OFFICE O/P EST MOD 30 MIN: CPT | Mod: S$GLB,,, | Performed by: INTERNAL MEDICINE

## 2018-08-14 RX ORDER — ERGOCALCIFEROL 1.25 MG/1
50000 CAPSULE ORAL
Qty: 12 CAPSULE | Refills: 5 | Status: SHIPPED | OUTPATIENT
Start: 2018-08-14 | End: 2018-11-12

## 2018-08-14 NOTE — PROGRESS NOTES
Subjective:       Patient ID: David Amaro is a 73 y.o. Black or  male who presents for new evaluation of Follow-up; Chronic Kidney Disease; Hypertension; and Vitamin D Deficiency    Hypertension   Pertinent negatives include no chest pain, headaches, neck pain, palpitations or shortness of breath.        Patient is a 72-year-old male with history of rising creatinine recently from a baseline of 1.1 up to 1.8.  Patient has had left elbow arthritis status post rheumatology evaluation.  Patient had been taking Advil twice a day for inflammation of the left elbow.  He used Advil for about a month.  He also has been taking Zestril 2.5 mg for blood pressure control.  Patient has been working in the [employer] and has had episodes of dehydration with hypotension.    May 2018 seen for acute kidney injury:  Stop all nonsteroidals.  Oral hydration.  Stop Zestril.    Patient had emergency room with it in bed and [facility] on 5/25/2018 patient had presented with abdominal pain nausea vomiting abdominal pain and had a CT scan of the abdomen showing Right inguinal hernia containing mostly fat, and a small portion of the cecal tip. Diverticulosis coli. Right kidney: No calculi. No hydronephrosis. Left kidney: Left-sided hydronephrosis and hydroureter, secondary to a 2 mm calculus in the proximal third of the left ureter, axial image 65, coronal image 45. Left renal edema and perinephric fat stranding. No intrarenal calculi. 1 cm cortical cyst present in the posterior lower pole region. Urinary bladder appears unremarkable. Radiation seed implants in the prostate bed region.       June 2018 patient has improved on his creatinine with the creatinine down to 1.5.  No heavy proteinuria. Pending follow up with dr. Da Velez for stone and Prostate ca. Has passed the stone.  Added Cardura for blood pressure control which needed prior authorization    August 2018 comes back for follow-up kidney function  "stable.  Vitamin-D deficiency.  Weekly ergocalciferol started    Review of Systems   Constitutional: Negative.  Negative for activity change, appetite change, chills, diaphoresis, fatigue and fever.   HENT: Negative.  Negative for congestion and trouble swallowing.    Eyes: Negative.    Respiratory: Negative.  Negative for cough, chest tightness, shortness of breath and wheezing.    Cardiovascular: Negative.  Negative for chest pain, palpitations and leg swelling.   Gastrointestinal: Negative.  Negative for abdominal distention, abdominal pain, nausea and vomiting.   Genitourinary: Negative.  Negative for decreased urine volume, difficulty urinating, dysuria, enuresis, flank pain, frequency, hematuria, penile swelling, scrotal swelling and urgency.   Musculoskeletal: Negative.  Negative for arthralgias, back pain, joint swelling and neck pain.   Skin: Negative for rash.   Neurological: Negative.  Negative for tremors, seizures and headaches.   Psychiatric/Behavioral: Negative.  Negative for confusion and sleep disturbance. The patient is not nervous/anxious.    All other systems reviewed and are negative.      Objective:   BP (!) 142/84   Pulse 62   Ht 5' 9" (1.753 m)   Wt 76.1 kg (167 lb 12.3 oz)   BMI 24.78 kg/m²      Physical Exam   Constitutional: He is oriented to person, place, and time. He appears well-developed and well-nourished. No distress.   HENT:   Head: Normocephalic.   Eyes: EOM are normal. Pupils are equal, round, and reactive to light.   Neck: Normal range of motion. Neck supple. No JVD present. No thyromegaly present.   Cardiovascular: Normal rate, regular rhythm, S1 normal, S2 normal, normal heart sounds and intact distal pulses. PMI is not displaced. Exam reveals no gallop and no friction rub.   No murmur heard.  Pulmonary/Chest: Effort normal and breath sounds normal. No respiratory distress. He has no wheezes. He has no rales. He exhibits no tenderness.   Abdominal: He exhibits no " distension and no mass. There is no hepatosplenomegaly. There is no tenderness. There is no rebound and no CVA tenderness. No hernia.   Musculoskeletal: Normal range of motion. He exhibits no edema or tenderness.   Lymphadenopathy:     He has no cervical adenopathy.   Neurological: He is alert and oriented to person, place, and time. He has normal reflexes. He is not disoriented. He displays normal reflexes. No cranial nerve deficit. He exhibits normal muscle tone. Coordination normal.   Skin: Skin is warm and dry. No rash noted. No erythema.   Psychiatric: He has a normal mood and affect. His behavior is normal. Judgment and thought content normal.         Lab Results   Component Value Date    CREATININE 1.5 (H) 08/07/2018    BUN 25 (H) 08/07/2018     08/07/2018    K 4.4 08/07/2018     08/07/2018    CO2 25 08/07/2018     Lab Results   Component Value Date    WBC 5.13 08/07/2018    HGB 11.4 (L) 08/07/2018    HCT 36.8 (L) 08/07/2018    MCV 95 08/07/2018     08/07/2018     Lab Results   Component Value Date    .0 (H) 08/07/2018    CALCIUM 9.0 08/07/2018    PHOS 2.9 08/07/2018         Assessment:    )    1. Chronic kidney disease (CKD) stage G3a/A1, moderately decreased glomerular filtration rate (GFR) between 45-59 mL/min/1.73 square meter and albuminuria creatinine ratio less than 30 mg/g    2. Essential hypertension    3. Benign prostatic hyperplasia, unspecified whether lower urinary tract symptoms present    4. Hydronephrosis of left kidney    5. Kidney stone on left side        Plan:           1.  Acute kidney injury likely from dehydration.  Much improved after oral hydration. ACEI for ever     2.  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  GFR based on Cystatin C with a corresponding creatinine of 1.5 is equal to 59%.    Urology to follow up on the left-sided hydronephrosis.    3.  Essential hypertension:  Stable on  Cardura at bedtime.    4.  BPH on Flomax    5. Dr. Velez to see for prostate issues     6.  Vitamin-D deficiency start on ergocalciferol weekly and recheck parathyroid hormone and calcium and phosphorus on follow-up in 3 months

## 2018-11-14 ENCOUNTER — OFFICE VISIT (OUTPATIENT)
Dept: FAMILY MEDICINE | Facility: CLINIC | Age: 73
End: 2018-11-14
Payer: MEDICARE

## 2018-11-14 ENCOUNTER — LAB VISIT (OUTPATIENT)
Dept: LAB | Facility: HOSPITAL | Age: 73
End: 2018-11-14
Payer: MEDICARE

## 2018-11-14 VITALS
TEMPERATURE: 98 F | BODY MASS INDEX: 25.01 KG/M2 | HEIGHT: 69 IN | SYSTOLIC BLOOD PRESSURE: 138 MMHG | OXYGEN SATURATION: 98 % | RESPIRATION RATE: 16 BRPM | WEIGHT: 168.88 LBS | DIASTOLIC BLOOD PRESSURE: 72 MMHG | HEART RATE: 98 BPM

## 2018-11-14 VITALS
TEMPERATURE: 98 F | WEIGHT: 168.88 LBS | SYSTOLIC BLOOD PRESSURE: 138 MMHG | DIASTOLIC BLOOD PRESSURE: 72 MMHG | BODY MASS INDEX: 25.01 KG/M2 | HEIGHT: 69 IN | OXYGEN SATURATION: 98 % | HEART RATE: 79 BPM

## 2018-11-14 DIAGNOSIS — E03.8 OTHER SPECIFIED HYPOTHYROIDISM: ICD-10-CM

## 2018-11-14 DIAGNOSIS — N18.30 STAGE 3 CHRONIC KIDNEY DISEASE: ICD-10-CM

## 2018-11-14 DIAGNOSIS — E78.5 DYSLIPIDEMIA: ICD-10-CM

## 2018-11-14 DIAGNOSIS — C61 PROSTATE CANCER: ICD-10-CM

## 2018-11-14 DIAGNOSIS — E03.9 HYPOTHYROIDISM, UNSPECIFIED TYPE: ICD-10-CM

## 2018-11-14 DIAGNOSIS — E23.6 EMPTY SELLA: ICD-10-CM

## 2018-11-14 DIAGNOSIS — I10 ESSENTIAL HYPERTENSION: ICD-10-CM

## 2018-11-14 DIAGNOSIS — N25.81 SECONDARY HYPERPARATHYROIDISM OF RENAL ORIGIN: Primary | ICD-10-CM

## 2018-11-14 DIAGNOSIS — N40.0 BENIGN PROSTATIC HYPERPLASIA, UNSPECIFIED WHETHER LOWER URINARY TRACT SYMPTOMS PRESENT: ICD-10-CM

## 2018-11-14 DIAGNOSIS — D64.9 ANEMIA, UNSPECIFIED TYPE: ICD-10-CM

## 2018-11-14 DIAGNOSIS — N25.81 SECONDARY HYPERPARATHYROIDISM OF RENAL ORIGIN: ICD-10-CM

## 2018-11-14 DIAGNOSIS — D12.3 BENIGN NEOPLASM OF TRANSVERSE COLON: ICD-10-CM

## 2018-11-14 DIAGNOSIS — Z00.00 ENCOUNTER FOR PREVENTIVE HEALTH EXAMINATION: Primary | ICD-10-CM

## 2018-11-14 PROBLEM — K92.1 HEMATOCHEZIA: Status: RESOLVED | Noted: 2017-04-02 | Resolved: 2018-11-14

## 2018-11-14 LAB
ALBUMIN SERPL BCP-MCNC: 3.7 G/DL
ALP SERPL-CCNC: 84 U/L
ALT SERPL W/O P-5'-P-CCNC: 18 U/L
ANION GAP SERPL CALC-SCNC: 7 MMOL/L
AST SERPL-CCNC: 22 U/L
BASOPHILS # BLD AUTO: 0.04 K/UL
BASOPHILS NFR BLD: 0.8 %
BILIRUB SERPL-MCNC: 0.4 MG/DL
BUN SERPL-MCNC: 24 MG/DL
CALCIUM SERPL-MCNC: 9.1 MG/DL
CHLORIDE SERPL-SCNC: 110 MMOL/L
CO2 SERPL-SCNC: 24 MMOL/L
CREAT SERPL-MCNC: 1.4 MG/DL
DIFFERENTIAL METHOD: ABNORMAL
EOSINOPHIL # BLD AUTO: 0 K/UL
EOSINOPHIL NFR BLD: 0.6 %
ERYTHROCYTE [DISTWIDTH] IN BLOOD BY AUTOMATED COUNT: 12.8 %
ERYTHROCYTE [SEDIMENTATION RATE] IN BLOOD BY WESTERGREN METHOD: 15 MM/HR
EST. GFR  (AFRICAN AMERICAN): 57.6 ML/MIN/1.73 M^2
EST. GFR  (NON AFRICAN AMERICAN): 49.8 ML/MIN/1.73 M^2
GLUCOSE SERPL-MCNC: 95 MG/DL
HCT VFR BLD AUTO: 39.6 %
HGB BLD-MCNC: 12.6 G/DL
IMM GRANULOCYTES # BLD AUTO: 0.01 K/UL
IMM GRANULOCYTES NFR BLD AUTO: 0.2 %
LYMPHOCYTES # BLD AUTO: 1.2 K/UL
LYMPHOCYTES NFR BLD: 22.2 %
MCH RBC QN AUTO: 29 PG
MCHC RBC AUTO-ENTMCNC: 31.8 G/DL
MCV RBC AUTO: 91 FL
MONOCYTES # BLD AUTO: 0.5 K/UL
MONOCYTES NFR BLD: 9 %
NEUTROPHILS # BLD AUTO: 3.5 K/UL
NEUTROPHILS NFR BLD: 67.2 %
NRBC BLD-RTO: 0 /100 WBC
PLATELET # BLD AUTO: 217 K/UL
PMV BLD AUTO: 10.5 FL
POTASSIUM SERPL-SCNC: 4.5 MMOL/L
PROT SERPL-MCNC: 6.8 G/DL
RBC # BLD AUTO: 4.34 M/UL
SODIUM SERPL-SCNC: 141 MMOL/L
WBC # BLD AUTO: 5.23 K/UL

## 2018-11-14 PROCEDURE — 3078F DIAST BP <80 MM HG: CPT | Mod: CPTII,HCNC,S$GLB, | Performed by: REGISTERED NURSE

## 2018-11-14 PROCEDURE — 99999 PR PBB SHADOW E&M-EST. PATIENT-LVL IV: CPT | Mod: PBBFAC,HCNC,, | Performed by: INTERNAL MEDICINE

## 2018-11-14 PROCEDURE — 85025 COMPLETE CBC W/AUTO DIFF WBC: CPT | Mod: HCNC

## 2018-11-14 PROCEDURE — 3075F SYST BP GE 130 - 139MM HG: CPT | Mod: CPTII,HCNC,S$GLB, | Performed by: INTERNAL MEDICINE

## 2018-11-14 PROCEDURE — G0439 PPPS, SUBSEQ VISIT: HCPCS | Mod: HCNC,S$GLB,, | Performed by: REGISTERED NURSE

## 2018-11-14 PROCEDURE — 99215 OFFICE O/P EST HI 40 MIN: CPT | Mod: HCNC,S$GLB,, | Performed by: INTERNAL MEDICINE

## 2018-11-14 PROCEDURE — 3078F DIAST BP <80 MM HG: CPT | Mod: CPTII,HCNC,S$GLB, | Performed by: INTERNAL MEDICINE

## 2018-11-14 PROCEDURE — 99499 UNLISTED E&M SERVICE: CPT | Mod: S$GLB,,, | Performed by: INTERNAL MEDICINE

## 2018-11-14 PROCEDURE — 85652 RBC SED RATE AUTOMATED: CPT | Mod: HCNC

## 2018-11-14 PROCEDURE — 99999 PR PBB SHADOW E&M-EST. PATIENT-LVL III: CPT | Mod: PBBFAC,HCNC,, | Performed by: REGISTERED NURSE

## 2018-11-14 PROCEDURE — 99499 UNLISTED E&M SERVICE: CPT | Mod: S$GLB,,, | Performed by: REGISTERED NURSE

## 2018-11-14 PROCEDURE — 36415 COLL VENOUS BLD VENIPUNCTURE: CPT | Mod: HCNC,PO

## 2018-11-14 PROCEDURE — 1101F PT FALLS ASSESS-DOCD LE1/YR: CPT | Mod: CPTII,HCNC,S$GLB, | Performed by: INTERNAL MEDICINE

## 2018-11-14 PROCEDURE — 3075F SYST BP GE 130 - 139MM HG: CPT | Mod: CPTII,HCNC,S$GLB, | Performed by: REGISTERED NURSE

## 2018-11-14 PROCEDURE — 80053 COMPREHEN METABOLIC PANEL: CPT | Mod: HCNC

## 2018-11-14 NOTE — PROGRESS NOTES
Subjective:       Patient ID: David Amaro is a 73 y.o. male.    Chief Complaint: Follow-up; Hyperlipidemia; Anemia; Benign Prostatic Hypertrophy; and Chronic Kidney Disease    Hyperlipidemia   Associated symptoms include myalgias. Pertinent negatives include no chest pain or shortness of breath.   Anemia   There has been no abdominal pain, bruising/bleeding easily, confusion, fever, light-headedness, pallor or palpitations.   Benign Prostatic Hypertrophy   Irritative symptoms do not include frequency or urgency. Pertinent negatives include no chills, dysuria, hematuria, nausea or vomiting.     Past Medical History:   Diagnosis Date    Arthritis     Cancer     prostate    CKD (chronic kidney disease)     Dyslipidemia     HTN (hypertension)     Hyperlipidemia     Hypothyroidism     Prostate cancer     Thyroid disease      Past Surgical History:   Procedure Laterality Date    COLONOSCOPY N/A 4/3/2017    Procedure: COLONOSCOPY;  Surgeon: Olivier Grayson MD;  Location: [facility];  Service: Endoscopy;  Laterality: N/A;    COLONOSCOPY N/A 4/3/2017    [facility]formed by [facility] at [facility]    COLONOSCOPY N/A 4/24/2014    [facility] at [facility]    HERNIA REPAIR  2009 approx    seed implants in prostate      TONSILLECTOMY       Family History   Problem Relation Age of Onset    Stroke Mother     Cancer Mother     Colon cancer Mother     Diabetes Father     Cancer Sister         lung    Cancer Brother     Bone cancer Brother     Heart disease Maternal Grandmother      Social History     Socioeconomic History    Marital status:      Spouse name: Not on file    Number of children: Not on file    Years of education: Not on file    Highest education level: Not on file   Social Needs    Financial resource strain: Not on file    Food insecurity - worry: Not on file    Food insecurity - inability: Not on file    Transportation needs - medical: Not on file     Transportation needs - non-medical: Not on file   Occupational History    Not on file   Tobacco Use    Smoking status: Never Smoker    Smokeless tobacco: Never Used   Substance and Sexual Activity    Alcohol use: No    Drug use: No    Sexual activity: Yes     Partners: Female   Other Topics Concern    Not on file   Social History Narrative    Not on file     Review of Systems   Constitutional: Negative for activity change, appetite change, chills, diaphoresis, fatigue, fever and unexpected weight change.   HENT: Negative for drooling, ear discharge, ear pain, facial swelling, hearing loss, mouth sores, nosebleeds, postnasal drip, rhinorrhea, sinus pressure, sneezing, sore throat, tinnitus, trouble swallowing and voice change.    Eyes: Negative for photophobia, redness and visual disturbance.   Respiratory: Negative for apnea, cough, choking, chest tightness, shortness of breath and wheezing.    Cardiovascular: Negative for chest pain, palpitations and leg swelling.   Gastrointestinal: Negative for abdominal distention, abdominal pain, anal bleeding, blood in stool, constipation, diarrhea, nausea and vomiting.   Endocrine: Negative for cold intolerance, heat intolerance, polydipsia, polyphagia and polyuria.   Genitourinary: Negative for difficulty urinating, dysuria, enuresis, flank pain, frequency, genital sores, hematuria and urgency.   Musculoskeletal: Positive for arthralgias and myalgias. Negative for back pain, gait problem, joint swelling, neck pain and neck stiffness.   Skin: Negative for color change, pallor, rash and wound.   Allergic/Immunologic: Negative for food allergies and immunocompromised state.   Neurological: Negative for dizziness, tremors, seizures, syncope, facial asymmetry, speech difficulty, weakness, light-headedness, numbness and headaches.   Hematological: Negative for adenopathy. Does not bruise/bleed easily.   Psychiatric/Behavioral: Negative for agitation, behavioral problems,  confusion, decreased concentration, dysphoric mood, hallucinations, self-injury, sleep disturbance and suicidal ideas. The patient is not nervous/anxious and is not hyperactive.        Objective:      Physical Exam   Constitutional: He is oriented to person, place, and time. He appears well-developed and well-nourished. No distress.   HENT:   Head: Normocephalic and atraumatic.   Eyes: Pupils are equal, round, and reactive to light. No scleral icterus.   Neck: Normal range of motion. Neck supple. No JVD present. Carotid bruit is not present. No tracheal deviation present. No thyromegaly present.   Cardiovascular: Normal rate, regular rhythm, normal heart sounds and intact distal pulses.   Pulmonary/Chest: Effort normal and breath sounds normal. No respiratory distress. He has no wheezes. He has no rales. He exhibits no tenderness.   Abdominal: Soft. Bowel sounds are normal. He exhibits no distension. There is no tenderness. There is no rebound and no guarding.   Musculoskeletal: Normal range of motion. He exhibits no edema or tenderness.   Lymphadenopathy:     He has no cervical adenopathy.   Neurological: He is alert and oriented to person, place, and time. No cranial nerve deficit. Coordination normal.   Skin: Skin is warm and dry. No rash noted. He is not diaphoretic. No erythema. No pallor.   Psychiatric: He has a normal mood and affect. His behavior is normal. Judgment and thought content normal.   Nursing note and vitals reviewed.      CMP  Sodium   Date Value Ref Range Status   08/07/2018 139 136 - 145 mmol/L Final     Potassium   Date Value Ref Range Status   08/07/2018 4.4 3.5 - 5.1 mmol/L Final     Chloride   Date Value Ref Range Status   08/07/2018 108 95 - 110 mmol/L Final     CO2   Date Value Ref Range Status   08/07/2018 25 23 - 29 mmol/L Final     Glucose   Date Value Ref Range Status   08/07/2018 87 70 - 110 mg/dL Final     BUN, Bld   Date Value Ref Range Status   08/07/2018 25 (H) 8 - 23 mg/dL Final      Creatinine   Date Value Ref Range Status   08/07/2018 1.5 (H) 0.5 - 1.4 mg/dL Final     Calcium   Date Value Ref Range Status   08/07/2018 9.0 8.7 - 10.5 mg/dL Final     Total Protein   Date Value Ref Range Status   05/16/2018 7.3 6.0 - 8.4 g/dL Final     Albumin   Date Value Ref Range Status   08/07/2018 3.6 3.5 - 5.2 g/dL Final     Total Bilirubin   Date Value Ref Range Status   05/16/2018 0.5 0.1 - 1.0 mg/dL Final     Comment:     For infants and newborns, interpretation of results should be based  on gestational age, weight and in agreement with clinical  observations.  Premature Infant recommended reference ranges:  Up to 24 hours.............<8.0 mg/dL  Up to 48 hours............<12.0 mg/dL  3-5 days..................<15.0 mg/dL  6-29 days.................<15.0 mg/dL       Alkaline Phosphatase   Date Value Ref Range Status   05/16/2018 82 55 - 135 U/L Final     AST   Date Value Ref Range Status   05/16/2018 21 10 - 40 U/L Final     ALT   Date Value Ref Range Status   05/16/2018 21 10 - 44 U/L Final     Anion Gap   Date Value Ref Range Status   08/07/2018 6 (L) 8 - 16 mmol/L Final     eGFR if    Date Value Ref Range Status   08/07/2018 53.0 (A) >60 mL/min/1.73 m^2 Final     eGFR if non    Date Value Ref Range Status   08/07/2018 45.9 (A) >60 mL/min/1.73 m^2 Final     Comment:     Calculation used to obtain the estimated glomerular filtration  rate (eGFR) is the CKD-EPI equation.        Lab Results   Component Value Date    WBC 5.13 08/07/2018    HGB 11.4 (L) 08/07/2018    HCT 36.8 (L) 08/07/2018    MCV 95 08/07/2018     08/07/2018     Lab Results   Component Value Date    CHOL 219 (H) 05/16/2018     Lab Results   Component Value Date    HDL 75 05/16/2018     Lab Results   Component Value Date    LDLCALC 135.4 05/16/2018     Lab Results   Component Value Date    TRIG 43 05/16/2018     Lab Results   Component Value Date    CHOLHDL 34.2 05/16/2018     Lab Results    Component Value Date    TSH 2.361 05/16/2018     Lab Results   Component Value Date    HGBA1C 5.7 12/20/2005     Assessment:       1. Secondary hyperparathyroidism of renal origin    2. Stage 3 chronic kidney disease    3. Prostate cancer    4. Dyslipidemia    5. Benign prostatic hyperplasia, unspecified whether lower urinary tract symptoms present    6. Other specified hypothyroidism    7. Anemia, unspecified type        Plan:   Secondary hyperparathyroidism of renal origin    Stage 3 chronic kidney disease-stable.  -     Comprehensive metabolic panel; Future; Expected date: 11/14/2018  -     CBC auto differential; Future; Expected date: 11/14/2018    Prostate cancer----------------sees urology dr delvalle.    Dyslipidemia-------stable.    Benign prostatic hyperplasia, unspecified whether lower urinary tract symptoms present    Other specified hypothyroidism    Anemia, unspecified type--stable.  -     Comprehensive metabolic panel; Future; Expected date: 11/14/2018  -     CBC auto differential; Future; Expected date: 11/14/2018  -     Sedimentation rate; Future; Expected date: 11/14/2018    Sees rheum/        Sees cards dr lawton.                      F/u 6 months.,

## 2018-11-20 PROBLEM — E23.6 EMPTY SELLA: Status: ACTIVE | Noted: 2018-11-20

## 2018-11-20 PROBLEM — E03.9 HYPOTHYROIDISM, UNSPECIFIED: Status: ACTIVE | Noted: 2018-01-11

## 2018-11-20 NOTE — PROGRESS NOTES
"David Amaro presented for a  Medicare AWV and comprehensive Health Risk Assessment today. The following components were reviewed and updated:    · Medical history  · Family History  · Social history  · Allergies and Current Medications  · Health Risk Assessment  · Health Maintenance  · Care Team     ** See Completed Assessments for Annual Wellness Visit within the encounter summary.**       The following assessments were completed:  · Living Situation  · CAGE  · Depression Screening  · Timed Get Up and Go  · Whisper Test  · Cognitive Function Screening  · Nutrition Screening  · ADL Screening  · PAQ Screening    Vitals:    11/14/18 1411   BP: 138/72   BP Location: Left arm   Patient Position: Sitting   Pulse: 79   Temp: 97.5 °F (36.4 °C)   TempSrc: Oral   SpO2: 98%   Weight: 76.6 kg (168 lb 14 oz)   Height: 5' 9" (1.753 m)   BMI 24.94       Physical Exam   Constitutional: He is oriented to person, place, and time. He appears well-developed and well-nourished.   HENT:   Head: Normocephalic and atraumatic.   Eyes: Conjunctivae and EOM are normal. Pupils are equal, round, and reactive to light.   Neck: Normal range of motion. Neck supple.   Cardiovascular: Normal rate and regular rhythm.   Pulmonary/Chest: Effort normal and breath sounds normal.   Neurological: He is alert and oriented to person, place, and time.   Vitals reviewed.        Diagnoses and health risks identified today and associated recommendations/orders:    1. Encounter for preventive health examination  HRA completed today.      2. Essential hypertension  Stable and controlled, on Cardura as ordered.  Denies edema, CP, SOB or weakness.  Followed by Dr. Lucien aHckett//Cardiology.      3. Dyslipidemia  Stable at this time, on no medication, diet-controlled.  Total cholesterol has risen some from 2014 (see lab result) but HDL significantly elevated.  Followed by PCP and Cardiology.    Component      Latest Ref Rng & Units 5/16/2018 7/14/2014 "   Cholesterol      120 - 199 mg/dL 219 (H) 198   Triglycerides      30 - 150 mg/dL 43 35   HDL      40 - 75 mg/dL 75 70   LDL Cholesterol      63.0 - 159.0 mg/dL 135.4 121.0   HDL/Chol Ratio      20.0 - 50.0 % 34.2 35.4   Total Cholesterol/HDL Ratio      2.0 - 5.0 2.9 2.8   Non-HDL Cholesterol      mg/dL 144 128       4. Hypothyroidism, unspecified type  Stable, on Synthroid 25 mcg daily as ordered.  Followed by PCP.    Component      Latest Ref Rng & Units 5/16/2018   TSH      0.400 - 4.000 uIU/mL 2.361         5. Empty sella  Stable at this time.  MRI of brain 12/2011 --- mild age appropriate generalized atrophy; few scattered subcentimeter foci of intermediate to increased signal within the deep and subcortical white matter right greater than left, possible small vessel ischemic changes; no mass lesion, fluid collection or abnormal area of enhancement; incidental note made of high riding right jugular bulb and partially empty sella; minimal right sided sinus disease.  Denies any dizziness, vision problems, confusion or memory loss.  Followed by PCP.      6. Secondary hyperparathyroidism of renal origin  PTH level elevated, calcium normal.  Followed by Nephrology.    Component      Latest Ref Rng & Units 8/7/2018   PTH      9.0 - 77.0 pg/mL 103.0 (H)       7. Stage 3 chronic kidney disease  Stable, followed by Nephrology.  Per last office note 8/2018 Dr. 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  GFR based on Cystatin C with a corresponding creatinine of 1.5 is equal to 59%.  Urology to follow up on the left-sided hydronephrosis.  Followed by Nephrology and Urology.      8. Benign prostatic hyperplasia, unspecified whether lower urinary tract symptoms present  Stable, currently on Cardura and Flomax as ordered.  Followed by Urology.      9. Prostate cancer  Stable at this time.  Diagnosed with prostate cancer in 2009, had seed implants  done.  Denies  issues, not currently sexually active.  Followed by Urology.      10. Benign neoplasm of transverse colon  Last colonoscopy done 4/2017, due 5 years.  Denies rectal pain, melena, rectal bleeding or stool problems.  Followed by GI.        Provided David with a 5-10 year written screening schedule and personal prevention plan. Recommendations were developed using the USPSTF age appropriate recommendations. Education, counseling, and referrals were provided as needed. After Visit Summary printed and given to patient which includes a list of additional screenings\tests needed.      I offered to discuss end of life issues, including information on how to make advance directives that the patient could use to name someone who would make medical decisions on their behalf if they became too ill to make themselves.  He declines at this time, reports currently has an Advanced Directive.  Advised him to bring copy to office for his chart, verb understanding.         Patient Care Team:  Stepan Pierre MD as PCP - General (Internal Medicine)  Carter Wray LPN as Care Coordinator (Internal Medicine)  Kurtis Garrett MD as Consulting Physician (Nephrology)  Jud Levy MD (Rheumatology)  Olivier Grayson MD as Consulting Physician (Gastroenterology)  Da Velez MD (Urology)  Lucien Hackett MD as Consulting Physician (Cardiology)        Beto Jason, CHATO

## 2018-12-11 RX ORDER — LEVOTHYROXINE SODIUM 25 UG/1
25 TABLET ORAL DAILY
Qty: 30 TABLET | Refills: 12 | Status: SHIPPED | OUTPATIENT
Start: 2018-12-11 | End: 2019-12-18 | Stop reason: SDUPTHER

## 2018-12-11 NOTE — TELEPHONE ENCOUNTER
----- Message from Zacarias Ramirez sent at 12/11/2018  9:03 AM CST -----  Contact: pt   States he's calling to get a refill and can be reached at [phone]//thanks/dbw     1. What is the name of the medication you are requesting? levothyroxin  2. What is the dose? 0.025mg / 25 mcg   3. How do you take the medication? Orally, topically, etc? Orally   4. How often do you take this medication? Once daily   5. Do you need a 30 day or 90 day supply? 30 days  6. How many refills are you requesting?   7. What is your preferred pharmacy and location of the pharmacy?   8. Who can we contact with further questions? Pt     [facility] [id number] [place] [address] [place] - [address] [facility] [address] [facility]  [address] [facility] [id number]  Phone: [phone] Fax: [phone]

## 2019-01-15 ENCOUNTER — LAB VISIT (OUTPATIENT)
Dept: LAB | Facility: HOSPITAL | Age: 74
End: 2019-01-15
Attending: INTERNAL MEDICINE
Payer: MEDICARE

## 2019-01-15 DIAGNOSIS — N40.0 BENIGN PROSTATIC HYPERPLASIA, UNSPECIFIED WHETHER LOWER URINARY TRACT SYMPTOMS PRESENT: ICD-10-CM

## 2019-01-15 DIAGNOSIS — I10 ESSENTIAL HYPERTENSION: ICD-10-CM

## 2019-01-15 DIAGNOSIS — N18.31 CHRONIC KIDNEY DISEASE (CKD) STAGE G3A/A1, MODERATELY DECREASED GLOMERULAR FILTRATION RATE (GFR) BETWEEN 45-59 ML/MIN/1.73 SQUARE METER AND ALBUMINURIA CREATININE RATIO LESS THAN 30 MG/G: ICD-10-CM

## 2019-01-15 DIAGNOSIS — N13.30 HYDRONEPHROSIS OF LEFT KIDNEY: ICD-10-CM

## 2019-01-15 DIAGNOSIS — N20.0 KIDNEY STONE ON LEFT SIDE: ICD-10-CM

## 2019-01-15 LAB
ALBUMIN SERPL BCP-MCNC: 3.8 G/DL
ANION GAP SERPL CALC-SCNC: 7 MMOL/L
BASOPHILS # BLD AUTO: 0.03 K/UL
BASOPHILS NFR BLD: 0.5 %
BUN SERPL-MCNC: 19 MG/DL
CALCIUM SERPL-MCNC: 9.4 MG/DL
CHLORIDE SERPL-SCNC: 109 MMOL/L
CO2 SERPL-SCNC: 24 MMOL/L
CREAT SERPL-MCNC: 1.3 MG/DL
DIFFERENTIAL METHOD: ABNORMAL
EOSINOPHIL # BLD AUTO: 0 K/UL
EOSINOPHIL NFR BLD: 0.3 %
ERYTHROCYTE [DISTWIDTH] IN BLOOD BY AUTOMATED COUNT: 13.1 %
EST. GFR  (AFRICAN AMERICAN): >60 ML/MIN/1.73 M^2
EST. GFR  (NON AFRICAN AMERICAN): 54.5 ML/MIN/1.73 M^2
GLUCOSE SERPL-MCNC: 88 MG/DL
HCT VFR BLD AUTO: 40.2 %
HGB BLD-MCNC: 12.7 G/DL
IMM GRANULOCYTES # BLD AUTO: 0.01 K/UL
IMM GRANULOCYTES NFR BLD AUTO: 0.2 %
LYMPHOCYTES # BLD AUTO: 1 K/UL
LYMPHOCYTES NFR BLD: 15 %
MCH RBC QN AUTO: 30 PG
MCHC RBC AUTO-ENTMCNC: 31.6 G/DL
MCV RBC AUTO: 95 FL
MONOCYTES # BLD AUTO: 0.3 K/UL
MONOCYTES NFR BLD: 5.2 %
NEUTROPHILS # BLD AUTO: 5 K/UL
NEUTROPHILS NFR BLD: 78.8 %
NRBC BLD-RTO: 0 /100 WBC
PHOSPHATE SERPL-MCNC: 2.9 MG/DL
PLATELET # BLD AUTO: 231 K/UL
PMV BLD AUTO: 10.1 FL
POTASSIUM SERPL-SCNC: 4.2 MMOL/L
PTH-INTACT SERPL-MCNC: 144 PG/ML
RBC # BLD AUTO: 4.23 M/UL
SODIUM SERPL-SCNC: 140 MMOL/L
WBC # BLD AUTO: 6.35 K/UL

## 2019-01-15 PROCEDURE — 83970 ASSAY OF PARATHORMONE: CPT | Mod: HCNC

## 2019-01-15 PROCEDURE — 82610 CYSTATIN C: CPT | Mod: HCNC

## 2019-01-15 PROCEDURE — 36415 COLL VENOUS BLD VENIPUNCTURE: CPT | Mod: HCNC

## 2019-01-15 PROCEDURE — 80069 RENAL FUNCTION PANEL: CPT | Mod: HCNC

## 2019-01-15 PROCEDURE — 85025 COMPLETE CBC W/AUTO DIFF WBC: CPT | Mod: HCNC

## 2019-01-17 LAB
CYSTATIN C SERPL-MCNC: 1.11 MG/L
GFR/BSA.PRED SERPLBLD CYS-BASED-ARV: 65 ML/MIN/BSA

## 2019-01-21 ENCOUNTER — OFFICE VISIT (OUTPATIENT)
Dept: NEPHROLOGY | Facility: CLINIC | Age: 74
End: 2019-01-21
Payer: MEDICARE

## 2019-01-21 VITALS
SYSTOLIC BLOOD PRESSURE: 142 MMHG | HEART RATE: 60 BPM | WEIGHT: 170.19 LBS | BODY MASS INDEX: 25.21 KG/M2 | DIASTOLIC BLOOD PRESSURE: 86 MMHG | HEIGHT: 69 IN

## 2019-01-21 DIAGNOSIS — N20.0 KIDNEY STONE ON LEFT SIDE: ICD-10-CM

## 2019-01-21 DIAGNOSIS — N25.81 HYPERPARATHYROIDISM, SECONDARY RENAL: ICD-10-CM

## 2019-01-21 DIAGNOSIS — N13.30 HYDRONEPHROSIS OF LEFT KIDNEY: ICD-10-CM

## 2019-01-21 DIAGNOSIS — N18.2 STAGE 2 CHRONIC KIDNEY DISEASE: Primary | ICD-10-CM

## 2019-01-21 DIAGNOSIS — N40.0 BENIGN PROSTATIC HYPERPLASIA, UNSPECIFIED WHETHER LOWER URINARY TRACT SYMPTOMS PRESENT: ICD-10-CM

## 2019-01-21 DIAGNOSIS — I10 ESSENTIAL HYPERTENSION: ICD-10-CM

## 2019-01-21 PROCEDURE — 3079F PR MOST RECENT DIASTOLIC BLOOD PRESSURE 80-89 MM HG: ICD-10-PCS | Mod: CPTII,HCNC,S$GLB, | Performed by: INTERNAL MEDICINE

## 2019-01-21 PROCEDURE — 99999 PR PBB SHADOW E&M-EST. PATIENT-LVL III: ICD-10-PCS | Mod: PBBFAC,HCNC,, | Performed by: INTERNAL MEDICINE

## 2019-01-21 PROCEDURE — 3079F DIAST BP 80-89 MM HG: CPT | Mod: CPTII,HCNC,S$GLB, | Performed by: INTERNAL MEDICINE

## 2019-01-21 PROCEDURE — 3077F PR MOST RECENT SYSTOLIC BLOOD PRESSURE >= 140 MM HG: ICD-10-PCS | Mod: CPTII,HCNC,S$GLB, | Performed by: INTERNAL MEDICINE

## 2019-01-21 PROCEDURE — 1100F PTFALLS ASSESS-DOCD GE2>/YR: CPT | Mod: CPTII,HCNC,S$GLB, | Performed by: INTERNAL MEDICINE

## 2019-01-21 PROCEDURE — 99214 PR OFFICE/OUTPT VISIT, EST, LEVL IV, 30-39 MIN: ICD-10-PCS | Mod: HCNC,S$GLB,, | Performed by: INTERNAL MEDICINE

## 2019-01-21 PROCEDURE — 3077F SYST BP >= 140 MM HG: CPT | Mod: CPTII,HCNC,S$GLB, | Performed by: INTERNAL MEDICINE

## 2019-01-21 PROCEDURE — 99214 OFFICE O/P EST MOD 30 MIN: CPT | Mod: HCNC,S$GLB,, | Performed by: INTERNAL MEDICINE

## 2019-01-21 PROCEDURE — 99499 UNLISTED E&M SERVICE: CPT | Mod: HCNC,S$GLB,, | Performed by: INTERNAL MEDICINE

## 2019-01-21 PROCEDURE — 1100F PR PT FALLS ASSESS DOC 2+ FALLS/FALL W/INJURY/YR: ICD-10-PCS | Mod: CPTII,HCNC,S$GLB, | Performed by: INTERNAL MEDICINE

## 2019-01-21 PROCEDURE — 99999 PR PBB SHADOW E&M-EST. PATIENT-LVL III: CPT | Mod: PBBFAC,HCNC,, | Performed by: INTERNAL MEDICINE

## 2019-01-21 PROCEDURE — 3288F FALL RISK ASSESSMENT DOCD: CPT | Mod: CPTII,HCNC,S$GLB, | Performed by: INTERNAL MEDICINE

## 2019-01-21 PROCEDURE — 3288F PR FALLS RISK ASSESSMENT DOCUMENTED: ICD-10-PCS | Mod: CPTII,HCNC,S$GLB, | Performed by: INTERNAL MEDICINE

## 2019-01-21 PROCEDURE — 99499 RISK ADDL DX/OHS AUDIT: ICD-10-PCS | Mod: HCNC,S$GLB,, | Performed by: INTERNAL MEDICINE

## 2019-01-21 NOTE — PROGRESS NOTES
Subjective:       Patient ID: David Amaro is a 73 y.o. Black or  male who presents for new evaluation of Chronic Kidney Disease    Hypertension   Pertinent negatives include no chest pain, headaches, neck pain, palpitations or shortness of breath.        Patient is a 73-year-old male with history of rising creatinine recently from a baseline of 1.1 up to 1.8.  Patient has had left elbow arthritis status post rheumatology evaluation.  Patient had been taking Advil twice a day for inflammation of the left elbow.  He used Advil for about a month.  He also has been taking Zestril 2.5 mg for blood pressure control.  Patient has been working in the [employer] and has had episodes of dehydration with hypotension.    May 2018 seen for acute kidney injury:  Stop all nonsteroidals.  Oral hydration.  Stop Zestril.    Patient had emergency room with it in bed and [facility] on 5/25/2018 patient had presented with abdominal pain nausea vomiting abdominal pain and had a CT scan of the abdomen showing Right inguinal hernia containing mostly fat, and a small portion of the cecal tip. Diverticulosis coli. Right kidney: No calculi. No hydronephrosis. Left kidney: Left-sided hydronephrosis and hydroureter, secondary to a 2 mm calculus in the proximal third of the left ureter, axial image 65, coronal image 45. Left renal edema and perinephric fat stranding. No intrarenal calculi. 1 cm cortical cyst present in the posterior lower pole region. Urinary bladder appears unremarkable. Radiation seed implants in the prostate bed region.       June 2018 patient has improved on his creatinine with the creatinine down to 1.5.  No heavy proteinuria. Pending follow up with dr. Da Velez for stone and Prostate ca. Has passed the stone.  Added Cardura for blood pressure control which needed prior authorization    August 2018 comes back for follow-up kidney function stable.  Vitamin-D deficiency.  Weekly ergocalciferol  "started    January 2019 creatinine is stable.  GFR 65% chronic kidney disease stage 2 stable overall.    Review of Systems   Constitutional: Negative.  Negative for activity change, appetite change, chills, diaphoresis, fatigue and fever.   HENT: Negative.  Negative for congestion and trouble swallowing.    Eyes: Negative.    Respiratory: Negative.  Negative for cough, chest tightness, shortness of breath and wheezing.    Cardiovascular: Negative.  Negative for chest pain, palpitations and leg swelling.   Gastrointestinal: Negative.  Negative for abdominal distention, abdominal pain, nausea and vomiting.   Genitourinary: Negative.  Negative for decreased urine volume, difficulty urinating, dysuria, enuresis, flank pain, frequency, hematuria, penile swelling, scrotal swelling and urgency.   Musculoskeletal: Negative.  Negative for arthralgias, back pain, joint swelling and neck pain.   Skin: Negative for rash.   Neurological: Negative.  Negative for tremors, seizures and headaches.   Psychiatric/Behavioral: Negative.  Negative for confusion and sleep disturbance. The patient is not nervous/anxious.    All other systems reviewed and are negative.      Objective:   BP (!) 142/86   Pulse 60   Ht 5' 9" (1.753 m)   Wt 77.2 kg (170 lb 3.1 oz)   BMI 25.13 kg/m²      Physical Exam   Constitutional: He is oriented to person, place, and time. He appears well-developed and well-nourished. No distress.   HENT:   Head: Normocephalic.   Eyes: EOM are normal. Pupils are equal, round, and reactive to light.   Neck: Normal range of motion. Neck supple. No JVD present. No thyromegaly present.   Cardiovascular: Normal rate, regular rhythm, S1 normal, S2 normal, normal heart sounds and intact distal pulses. PMI is not displaced. Exam reveals no gallop and no friction rub.   No murmur heard.  Pulmonary/Chest: Effort normal and breath sounds normal. No respiratory distress. He has no wheezes. He has no rales. He exhibits no " tenderness.   Abdominal: He exhibits no distension and no mass. There is no hepatosplenomegaly. There is no tenderness. There is no rebound and no CVA tenderness. No hernia.   Musculoskeletal: Normal range of motion. He exhibits no edema or tenderness.   Lymphadenopathy:     He has no cervical adenopathy.   Neurological: He is alert and oriented to person, place, and time. He has normal reflexes. He is not disoriented. He displays normal reflexes. No cranial nerve deficit. He exhibits normal muscle tone. Coordination normal.   Skin: Skin is warm and dry. No rash noted. No erythema.   Psychiatric: He has a normal mood and affect. His behavior is normal. Judgment and thought content normal.         Lab Results   Component Value Date    CREATININE 1.3 01/15/2019    BUN 19 01/15/2019     01/15/2019    K 4.2 01/15/2019     01/15/2019    CO2 24 01/15/2019     Lab Results   Component Value Date    WBC 6.35 01/15/2019    HGB 12.7 (L) 01/15/2019    HCT 40.2 01/15/2019    MCV 95 01/15/2019     01/15/2019     Lab Results   Component Value Date    .0 (H) 01/15/2019    CALCIUM 9.4 01/15/2019    PHOS 2.9 01/15/2019         Assessment:    )    1. Stage 2 chronic kidney disease    2. Essential hypertension    3. Benign prostatic hyperplasia, unspecified whether lower urinary tract symptoms present    4. Hydronephrosis of left kidney    5. Kidney stone on left side    6. Hyperparathyroidism, secondary renal        Plan:           1.  Chronic kidney disease stage 3 4/stage II:  Likely combination of left-sided hydronephrosis and analgesic nephropathy.  GFR based on Cystatin C with a corresponding creatinine of 1.3 is equal to 65 %.    Urology to follow up on the left-sided hydronephrosis.    3.  Essential hypertension:  Stable on Cardura at bedtime.    4.  BPH on Flomax    5. Dr. Velez to see for prostate issues     6.  2HPT :Vitamin-D deficiency  on ergocalciferol weekly

## 2019-02-19 ENCOUNTER — OFFICE VISIT (OUTPATIENT)
Dept: FAMILY MEDICINE | Facility: CLINIC | Age: 74
End: 2019-02-19
Payer: MEDICARE

## 2019-02-19 VITALS
DIASTOLIC BLOOD PRESSURE: 80 MMHG | BODY MASS INDEX: 25.39 KG/M2 | WEIGHT: 171.44 LBS | RESPIRATION RATE: 18 BRPM | OXYGEN SATURATION: 97 % | HEART RATE: 64 BPM | SYSTOLIC BLOOD PRESSURE: 138 MMHG | TEMPERATURE: 99 F | HEIGHT: 69 IN

## 2019-02-19 DIAGNOSIS — K40.90 UNILATERAL INGUINAL HERNIA WITHOUT OBSTRUCTION OR GANGRENE, RECURRENCE NOT SPECIFIED: Primary | ICD-10-CM

## 2019-02-19 DIAGNOSIS — L98.9 SKIN LESION: ICD-10-CM

## 2019-02-19 PROCEDURE — 3075F SYST BP GE 130 - 139MM HG: CPT | Mod: HCNC,CPTII,S$GLB, | Performed by: INTERNAL MEDICINE

## 2019-02-19 PROCEDURE — 3079F DIAST BP 80-89 MM HG: CPT | Mod: HCNC,CPTII,S$GLB, | Performed by: INTERNAL MEDICINE

## 2019-02-19 PROCEDURE — 3079F PR MOST RECENT DIASTOLIC BLOOD PRESSURE 80-89 MM HG: ICD-10-PCS | Mod: HCNC,CPTII,S$GLB, | Performed by: INTERNAL MEDICINE

## 2019-02-19 PROCEDURE — 99999 PR PBB SHADOW E&M-EST. PATIENT-LVL IV: CPT | Mod: PBBFAC,HCNC,, | Performed by: INTERNAL MEDICINE

## 2019-02-19 PROCEDURE — 99999 PR PBB SHADOW E&M-EST. PATIENT-LVL IV: ICD-10-PCS | Mod: PBBFAC,HCNC,, | Performed by: INTERNAL MEDICINE

## 2019-02-19 PROCEDURE — 1101F PR PT FALLS ASSESS DOC 0-1 FALLS W/OUT INJ PAST YR: ICD-10-PCS | Mod: HCNC,CPTII,S$GLB, | Performed by: INTERNAL MEDICINE

## 2019-02-19 PROCEDURE — 99213 PR OFFICE/OUTPT VISIT, EST, LEVL III, 20-29 MIN: ICD-10-PCS | Mod: HCNC,S$GLB,, | Performed by: INTERNAL MEDICINE

## 2019-02-19 PROCEDURE — 1101F PT FALLS ASSESS-DOCD LE1/YR: CPT | Mod: HCNC,CPTII,S$GLB, | Performed by: INTERNAL MEDICINE

## 2019-02-19 PROCEDURE — 3075F PR MOST RECENT SYSTOLIC BLOOD PRESS GE 130-139MM HG: ICD-10-PCS | Mod: HCNC,CPTII,S$GLB, | Performed by: INTERNAL MEDICINE

## 2019-02-19 PROCEDURE — 99213 OFFICE O/P EST LOW 20 MIN: CPT | Mod: HCNC,S$GLB,, | Performed by: INTERNAL MEDICINE

## 2019-02-19 RX ORDER — CLOTRIMAZOLE AND BETAMETHASONE DIPROPIONATE 10; .64 MG/G; MG/G
CREAM TOPICAL 2 TIMES DAILY
Qty: 15 G | Refills: 0 | Status: SHIPPED | OUTPATIENT
Start: 2019-02-19 | End: 2019-11-12

## 2019-02-19 RX ORDER — FERROUS SULFATE, DRIED 160(50) MG
1 TABLET, EXTENDED RELEASE ORAL EVERY OTHER DAY
COMMUNITY
Start: 2018-07-18 | End: 2019-12-10

## 2019-02-25 ENCOUNTER — OFFICE VISIT (OUTPATIENT)
Dept: SURGERY | Facility: CLINIC | Age: 74
End: 2019-02-25
Payer: MEDICARE

## 2019-02-25 VITALS
WEIGHT: 172.63 LBS | TEMPERATURE: 98 F | HEIGHT: 69 IN | HEART RATE: 59 BPM | SYSTOLIC BLOOD PRESSURE: 152 MMHG | DIASTOLIC BLOOD PRESSURE: 79 MMHG | BODY MASS INDEX: 25.57 KG/M2

## 2019-02-25 DIAGNOSIS — K40.90 RIGHT INGUINAL HERNIA: Primary | ICD-10-CM

## 2019-02-25 PROCEDURE — 3078F PR MOST RECENT DIASTOLIC BLOOD PRESSURE < 80 MM HG: ICD-10-PCS | Mod: HCNC,CPTII,S$GLB, | Performed by: SURGERY

## 2019-02-25 PROCEDURE — 99999 PR PBB SHADOW E&M-EST. PATIENT-LVL III: ICD-10-PCS | Mod: PBBFAC,HCNC,, | Performed by: SURGERY

## 2019-02-25 PROCEDURE — 3077F PR MOST RECENT SYSTOLIC BLOOD PRESSURE >= 140 MM HG: ICD-10-PCS | Mod: HCNC,CPTII,S$GLB, | Performed by: SURGERY

## 2019-02-25 PROCEDURE — 1101F PT FALLS ASSESS-DOCD LE1/YR: CPT | Mod: HCNC,CPTII,S$GLB, | Performed by: SURGERY

## 2019-02-25 PROCEDURE — 1101F PR PT FALLS ASSESS DOC 0-1 FALLS W/OUT INJ PAST YR: ICD-10-PCS | Mod: HCNC,CPTII,S$GLB, | Performed by: SURGERY

## 2019-02-25 PROCEDURE — 99999 PR PBB SHADOW E&M-EST. PATIENT-LVL III: CPT | Mod: PBBFAC,HCNC,, | Performed by: SURGERY

## 2019-02-25 PROCEDURE — 99203 OFFICE O/P NEW LOW 30 MIN: CPT | Mod: HCNC,S$GLB,, | Performed by: SURGERY

## 2019-02-25 PROCEDURE — 3077F SYST BP >= 140 MM HG: CPT | Mod: HCNC,CPTII,S$GLB, | Performed by: SURGERY

## 2019-02-25 PROCEDURE — 3078F DIAST BP <80 MM HG: CPT | Mod: HCNC,CPTII,S$GLB, | Performed by: SURGERY

## 2019-02-25 PROCEDURE — 99203 PR OFFICE/OUTPT VISIT, NEW, LEVL III, 30-44 MIN: ICD-10-PCS | Mod: HCNC,S$GLB,, | Performed by: SURGERY

## 2019-02-25 NOTE — LETTER
February 26, 2019      Stepan Pierre MD  [address] [place] [address]           [facility]  [address]  [place] [address]  Phone: [phone]  Fax: [phone]          Patient: David Amaro   MR Number: [id number]   YOB: 1945   Date of Visit: 2/25/2019       Dear Dr. Stepan Pierre:    Thank you for referring David Amaro to me for evaluation. Attached you will find relevant portions of my assessment and plan of care.    If you have questions, please do not hesitate to call me. I look forward to following David Amaro along with you.    Sincerely,    Santi Pedroza MD    Enclosure  CC:  No Recipients    If you would like to receive this communication electronically, please contact [email or web address] or [phone] to request more information on [employer] Link access.    For providers and/or their staff who would like to refer a patient to [facility], please contact us through our one-stop-shop provider referral line, [facility], at [phone].    If you feel you have received this communication in error or would no longer like to receive these types of communications, please e-mail [email or web address]

## 2019-02-26 NOTE — PROGRESS NOTES
History & Physical    SUBJECTIVE:     History of Present Illness:  Patient is a 73 y.o. male referred for right inguinal hernia. Patient reports found incidentally on exam. Prior history of left inguinal hernia repair. Denies any discomfort, or bulge in the area. Going on trip soon and wanted to see if needed repair prior to vacation.    Chief Complaint   Patient presents with    Consult       Review of patient's allergies indicates:   Allergen Reactions    No known drug allergies        Current Outpatient Medications   Medication Sig Dispense Refill    calcium-vitamin D3 (OS-GERMAIN 500 + D3) 500 mg(1,250mg) -200 unit per tablet Take 1 tablet by mouth every other day.      clotrimazole-betamethasone 1-0.05% (LOTRISONE) cream Apply topically 2 (two) times daily. 15 g 0    doxazosin (CARDURA) 4 MG tablet Take 1 tablet (4 mg total) by mouth every evening. 30 tablet 11    levothyroxine (SYNTHROID) 25 MCG tablet Take 1 tablet (25 mcg total) by mouth once daily. 30 tablet 12    tamsulosin (FLOMAX) 0.4 mg Cp24 Take 0.4 mg by mouth once daily.       No current facility-administered medications for this visit.        Past Medical History:   Diagnosis Date    Arthritis     Cancer     prostate    CKD (chronic kidney disease)     Dyslipidemia     HTN (hypertension)     Hyperlipidemia     Hypothyroidism     Prostate cancer     Thyroid disease      Past Surgical History:   Procedure Laterality Date    COLONOSCOPY N/A 4/3/2017    [facility]    COLONOSCOPY N/A 4/24/2014    [facility]    HERNIA REPAIR  2009 approx    seed implants in prostate      TONSILLECTOMY       Family History   Problem Relation Age of Onset    Stroke Mother     Cancer Mother     Colon cancer Mother     Diabetes Father     Cancer Sister         lung    Cancer Brother     Bone cancer Brother     Heart disease Maternal Grandmother      Social History     Tobacco Use     "Smoking status: Never Smoker    Smokeless tobacco: Never Used   Substance Use Topics    Alcohol use: No    Drug use: No        Review of Systems:  Review of Systems   Constitutional: Negative for activity change, appetite change, chills, diaphoresis, fatigue, fever and unexpected weight change.   HENT: Negative for congestion, hearing loss, rhinorrhea, sore throat and trouble swallowing.    Eyes: Negative for discharge and visual disturbance.   Respiratory: Negative for apnea, cough, choking, chest tightness, shortness of breath, wheezing and stridor.    Cardiovascular: Negative for chest pain, palpitations and leg swelling.   Gastrointestinal: Negative for abdominal distention, abdominal pain, anal bleeding, blood in stool, constipation, diarrhea, nausea, rectal pain and vomiting.   Endocrine: Negative for cold intolerance, heat intolerance, polydipsia, polyphagia and polyuria.   Genitourinary: Negative for difficulty urinating, dysuria, frequency, hematuria and urgency.   Musculoskeletal: Negative for arthralgias, back pain, joint swelling, myalgias and neck pain.   Skin: Negative for color change, pallor, rash and wound.   Neurological: Negative for dizziness, syncope, weakness, light-headedness, numbness and headaches.   Hematological: Negative for adenopathy. Does not bruise/bleed easily.   Psychiatric/Behavioral: Negative for agitation, confusion, decreased concentration, dysphoric mood and sleep disturbance. The patient is not nervous/anxious.        OBJECTIVE:     Vital Signs (Most Recent)  Temp: 97.8 °F (36.6 °C) (02/25/19 1058)  Pulse: (!) 59 (02/25/19 1058)  BP: (!) 152/79 (02/25/19 1058)  5' 9" (1.753 m)  78.3 kg (172 lb 9.9 oz)     Physical Exam:  Physical Exam   Constitutional: He is oriented to person, place, and time. He appears well-developed and well-nourished.   HENT:   Head: Normocephalic and atraumatic.   Eyes: EOM are normal.   Neck: Normal range of motion. Neck supple.   Cardiovascular: " Normal rate and regular rhythm.   Pulmonary/Chest: Effort normal and breath sounds normal.   Abdominal: Soft. Bowel sounds are normal. He exhibits no distension. There is no tenderness. A hernia (small reducible right inguinal hernia) is present.   Neurological: He is alert and oriented to person, place, and time.   Skin: Skin is warm and dry.   Vitals reviewed.        ASSESSMENT/PLAN:     74 y/o male with right inguinal hernia    PLAN:Plan     Small asymptomatic right inguinal hernia  Discussed options of observation vs surgical repair. Patient would like to observe as he is asymptomatic at this time. Signs and symptoms of incarceration/strangulation discussed with patient.   F/u prn

## 2019-05-14 ENCOUNTER — OFFICE VISIT (OUTPATIENT)
Dept: FAMILY MEDICINE | Facility: CLINIC | Age: 74
End: 2019-05-14
Payer: MEDICARE

## 2019-05-14 VITALS
BODY MASS INDEX: 24.95 KG/M2 | WEIGHT: 169 LBS | SYSTOLIC BLOOD PRESSURE: 121 MMHG | HEART RATE: 66 BPM | OXYGEN SATURATION: 96 % | DIASTOLIC BLOOD PRESSURE: 68 MMHG | TEMPERATURE: 97 F

## 2019-05-14 DIAGNOSIS — E78.5 DYSLIPIDEMIA: ICD-10-CM

## 2019-05-14 DIAGNOSIS — E03.9 HYPOTHYROIDISM, UNSPECIFIED TYPE: ICD-10-CM

## 2019-05-14 DIAGNOSIS — I10 ESSENTIAL HYPERTENSION: Primary | ICD-10-CM

## 2019-05-14 DIAGNOSIS — N18.30 STAGE 3 CHRONIC KIDNEY DISEASE: ICD-10-CM

## 2019-05-14 DIAGNOSIS — R60.9 EDEMA, UNSPECIFIED TYPE: ICD-10-CM

## 2019-05-14 PROCEDURE — 1101F PR PT FALLS ASSESS DOC 0-1 FALLS W/OUT INJ PAST YR: ICD-10-PCS | Mod: HCNC,CPTII,S$GLB, | Performed by: INTERNAL MEDICINE

## 2019-05-14 PROCEDURE — 99999 PR PBB SHADOW E&M-EST. PATIENT-LVL III: CPT | Mod: PBBFAC,HCNC,, | Performed by: INTERNAL MEDICINE

## 2019-05-14 PROCEDURE — 3078F DIAST BP <80 MM HG: CPT | Mod: HCNC,CPTII,S$GLB, | Performed by: INTERNAL MEDICINE

## 2019-05-14 PROCEDURE — 3074F PR MOST RECENT SYSTOLIC BLOOD PRESSURE < 130 MM HG: ICD-10-PCS | Mod: HCNC,CPTII,S$GLB, | Performed by: INTERNAL MEDICINE

## 2019-05-14 PROCEDURE — 99999 PR PBB SHADOW E&M-EST. PATIENT-LVL III: ICD-10-PCS | Mod: PBBFAC,HCNC,, | Performed by: INTERNAL MEDICINE

## 2019-05-14 PROCEDURE — 3074F SYST BP LT 130 MM HG: CPT | Mod: HCNC,CPTII,S$GLB, | Performed by: INTERNAL MEDICINE

## 2019-05-14 PROCEDURE — 99499 RISK ADDL DX/OHS AUDIT: ICD-10-PCS | Mod: HCNC,S$GLB,, | Performed by: INTERNAL MEDICINE

## 2019-05-14 PROCEDURE — 99214 PR OFFICE/OUTPT VISIT, EST, LEVL IV, 30-39 MIN: ICD-10-PCS | Mod: HCNC,S$GLB,, | Performed by: INTERNAL MEDICINE

## 2019-05-14 PROCEDURE — 99214 OFFICE O/P EST MOD 30 MIN: CPT | Mod: HCNC,S$GLB,, | Performed by: INTERNAL MEDICINE

## 2019-05-14 PROCEDURE — 1101F PT FALLS ASSESS-DOCD LE1/YR: CPT | Mod: HCNC,CPTII,S$GLB, | Performed by: INTERNAL MEDICINE

## 2019-05-14 PROCEDURE — 99499 UNLISTED E&M SERVICE: CPT | Mod: HCNC,S$GLB,, | Performed by: INTERNAL MEDICINE

## 2019-05-14 PROCEDURE — 3078F PR MOST RECENT DIASTOLIC BLOOD PRESSURE < 80 MM HG: ICD-10-PCS | Mod: HCNC,CPTII,S$GLB, | Performed by: INTERNAL MEDICINE

## 2019-05-14 NOTE — PROGRESS NOTES
Subjective:       Patient ID: David Amaro is a 73 y.o. male.    Chief Complaint: Follow-up; Hyperlipidemia; Hypertension; and Hypothyroidism    Hyperlipidemia   Pertinent negatives include no chest pain, myalgias or shortness of breath.   Hypertension   Pertinent negatives include no chest pain, headaches, neck pain, palpitations or shortness of breath.     Past Medical History:   Diagnosis Date    Arthritis     Cancer     prostate    CKD (chronic kidney disease)     Dyslipidemia     HTN (hypertension)     Hyperlipidemia     Hypothyroidism     Prostate cancer     Thyroid disease      Past Surgical History:   Procedure Laterality Date    COLONOSCOPY N/A 4/3/2017    [facility]    COLONOSCOPY N/A 4/24/2014    [facility]    HERNIA REPAIR  2009 approx    seed implants in prostate      TONSILLECTOMY       Family History   Problem Relation Age of Onset    Stroke Mother     Cancer Mother     Colon cancer Mother     Diabetes Father     Cancer Sister         lung    Cancer Brother     Bone cancer Brother     Heart disease Maternal Grandmother      Social History     Socioeconomic History    Marital status:      Spouse name: Not on file    Number of children: Not on file    Years of education: Not on file    Highest education level: Not on file   Occupational History    Not on file   Social Needs    Financial resource strain: Not on file    Food insecurity:     Worry: Not on file     Inability: Not on file    Transportation needs:     Medical: Not on file     Non-medical: Not on file   Tobacco Use    Smoking status: Never Smoker    Smokeless tobacco: Never Used   Substance and Sexual Activity    Alcohol use: No    Drug use: No    Sexual activity: Yes     Partners: Female   Lifestyle    Physical activity:     Days per week: Not on file     Minutes per session: Not on file    Stress: Not on file   Relationships     Social connections:     Talks on phone: Not on file     Gets together: Not on file     Attends Tenriism service: Not on file     Active member of club or organization: Not on file     Attends meetings of clubs or organizations: Not on file     Relationship status: Not on file   Other Topics Concern    Not on file   Social History Narrative    Not on file     Review of Systems   Constitutional: Negative for activity change, appetite change, chills, diaphoresis, fatigue, fever and unexpected weight change.   HENT: Negative for drooling, ear discharge, ear pain, facial swelling, hearing loss, mouth sores, nosebleeds, postnasal drip, rhinorrhea, sinus pressure, sneezing, sore throat, tinnitus, trouble swallowing and voice change.    Eyes: Negative for photophobia, redness and visual disturbance.   Respiratory: Negative for apnea, cough, choking, chest tightness, shortness of breath and wheezing.    Cardiovascular: Positive for leg swelling. Negative for chest pain and palpitations.   Gastrointestinal: Negative for abdominal distention, abdominal pain, anal bleeding, blood in stool, constipation, diarrhea, nausea and vomiting.   Endocrine: Negative for cold intolerance, heat intolerance, polydipsia, polyphagia and polyuria.   Genitourinary: Negative for difficulty urinating, dysuria, enuresis, flank pain, frequency, genital sores and hematuria.   Musculoskeletal: Negative for arthralgias, back pain, gait problem, joint swelling, myalgias, neck pain and neck stiffness.   Skin: Negative for color change, pallor, rash and wound.   Allergic/Immunologic: Negative for food allergies and immunocompromised state.   Neurological: Negative for dizziness, tremors, seizures, syncope, facial asymmetry, speech difficulty, weakness, light-headedness, numbness and headaches.   Hematological: Negative for adenopathy. Does not bruise/bleed easily.   Psychiatric/Behavioral: Negative for agitation, behavioral problems, confusion,  decreased concentration, dysphoric mood, hallucinations, self-injury, sleep disturbance and suicidal ideas. The patient is not nervous/anxious and is not hyperactive.        Objective:      Physical Exam   Constitutional: He is oriented to person, place, and time. He appears well-developed and well-nourished. No distress.   HENT:   Head: Normocephalic and atraumatic.   Eyes: Pupils are equal, round, and reactive to light. No scleral icterus.   Neck: Normal range of motion. Neck supple. No JVD present. Carotid bruit is not present. No tracheal deviation present. No thyromegaly present.   Cardiovascular: Normal rate, regular rhythm, normal heart sounds and intact distal pulses.   Pulmonary/Chest: Effort normal and breath sounds normal. No respiratory distress. He has no wheezes. He has no rales. He exhibits no tenderness.   Abdominal: Soft. Bowel sounds are normal. He exhibits no distension. There is no tenderness. There is no rebound and no guarding.   Musculoskeletal: Normal range of motion. He exhibits edema. He exhibits no tenderness.   Lymphadenopathy:     He has no cervical adenopathy.   Neurological: He is alert and oriented to person, place, and time. No cranial nerve deficit. Coordination normal.   Skin: Skin is warm and dry. No rash noted. He is not diaphoretic. No erythema. No pallor.   Psychiatric: He has a normal mood and affect. His behavior is normal. Judgment and thought content normal.   Nursing note and vitals reviewed.      CMP  Sodium   Date Value Ref Range Status   01/15/2019 140 136 - 145 mmol/L Final     Potassium   Date Value Ref Range Status   01/15/2019 4.2 3.5 - 5.1 mmol/L Final     Chloride   Date Value Ref Range Status   01/15/2019 109 95 - 110 mmol/L Final     CO2   Date Value Ref Range Status   01/15/2019 24 23 - 29 mmol/L Final     Glucose   Date Value Ref Range Status   01/15/2019 88 70 - 110 mg/dL Final     BUN, Bld   Date Value Ref Range Status   01/15/2019 19 8 - 23 mg/dL Final      Creatinine   Date Value Ref Range Status   01/15/2019 1.3 0.5 - 1.4 mg/dL Final     Calcium   Date Value Ref Range Status   01/15/2019 9.4 8.7 - 10.5 mg/dL Final     Total Protein   Date Value Ref Range Status   11/14/2018 6.8 6.0 - 8.4 g/dL Final     Albumin   Date Value Ref Range Status   01/15/2019 3.8 3.5 - 5.2 g/dL Final     Total Bilirubin   Date Value Ref Range Status   11/14/2018 0.4 0.1 - 1.0 mg/dL Final     Comment:     For infants and newborns, interpretation of results should be based  on gestational age, weight and in agreement with clinical  observations.  Premature Infant recommended reference ranges:  Up to 24 hours.............<8.0 mg/dL  Up to 48 hours............<12.0 mg/dL  3-5 days..................<15.0 mg/dL  6-29 days.................<15.0 mg/dL       Alkaline Phosphatase   Date Value Ref Range Status   11/14/2018 84 55 - 135 U/L Final     AST   Date Value Ref Range Status   11/14/2018 22 10 - 40 U/L Final     ALT   Date Value Ref Range Status   11/14/2018 18 10 - 44 U/L Final     Anion Gap   Date Value Ref Range Status   01/15/2019 7 (L) 8 - 16 mmol/L Final     eGFR if    Date Value Ref Range Status   01/15/2019 >60.0 >60 mL/min/1.73 m^2 Final     eGFR if non    Date Value Ref Range Status   01/15/2019 54.5 (A) >60 mL/min/1.73 m^2 Final     Comment:     Calculation used to obtain the estimated glomerular filtration  rate (eGFR) is the CKD-EPI equation.        Lab Results   Component Value Date    WBC 6.35 01/15/2019    HGB 12.7 (L) 01/15/2019    HCT 40.2 01/15/2019    MCV 95 01/15/2019     01/15/2019     Lab Results   Component Value Date    CHOL 219 (H) 05/16/2018     Lab Results   Component Value Date    HDL 75 05/16/2018     Lab Results   Component Value Date    LDLCALC 135.4 05/16/2018     Lab Results   Component Value Date    TRIG 43 05/16/2018     Lab Results   Component Value Date    CHOLHDL 34.2 05/16/2018     Lab Results   Component Value  Date    TSH 2.361 05/16/2018     Lab Results   Component Value Date    HGBA1C 5.7 12/20/2005     Assessment:       1. Essential hypertension    2. Dyslipidemia    3. Hypothyroidism, unspecified type    4. Stage 3 chronic kidney disease    5. Edema, unspecified type        Plan:   Essential hypertension  -     Basic metabolic panel; Future; Expected date: 05/14/2019    Dyslipidemia  -     Lipid panel; Future; Expected date: 05/14/2019    Hypothyroidism, unspecified type  -     TSH; Future; Expected date: 05/14/2019    Stage 3 chronic kidney disease    Edema, unspecified type  -     Cardiology Lab US Lower Extremity Veins Bilateral; Future  -     Cardiology Lab NANCY Resting, Lower Extremities; Future    Stable--------------continue meds.             F/u 6 months.

## 2019-05-15 ENCOUNTER — LAB VISIT (OUTPATIENT)
Dept: LAB | Facility: HOSPITAL | Age: 74
End: 2019-05-15
Attending: INTERNAL MEDICINE
Payer: MEDICARE

## 2019-05-15 DIAGNOSIS — E78.5 DYSLIPIDEMIA: ICD-10-CM

## 2019-05-15 DIAGNOSIS — I10 ESSENTIAL HYPERTENSION: ICD-10-CM

## 2019-05-15 DIAGNOSIS — E03.9 HYPOTHYROIDISM, UNSPECIFIED TYPE: ICD-10-CM

## 2019-05-15 LAB
ANION GAP SERPL CALC-SCNC: 7 MMOL/L (ref 8–16)
BUN SERPL-MCNC: 26 MG/DL (ref 8–23)
CALCIUM SERPL-MCNC: 9 MG/DL (ref 8.7–10.5)
CHLORIDE SERPL-SCNC: 109 MMOL/L (ref 95–110)
CHOLEST SERPL-MCNC: 196 MG/DL (ref 120–199)
CHOLEST/HDLC SERPL: 2.5 {RATIO} (ref 2–5)
CO2 SERPL-SCNC: 21 MMOL/L (ref 23–29)
CREAT SERPL-MCNC: 1.5 MG/DL (ref 0.5–1.4)
EST. GFR  (AFRICAN AMERICAN): 53 ML/MIN/1.73 M^2
EST. GFR  (NON AFRICAN AMERICAN): 45.9 ML/MIN/1.73 M^2
GLUCOSE SERPL-MCNC: 84 MG/DL (ref 70–110)
HDLC SERPL-MCNC: 77 MG/DL (ref 40–75)
HDLC SERPL: 39.3 % (ref 20–50)
LDLC SERPL CALC-MCNC: 111 MG/DL (ref 63–159)
NONHDLC SERPL-MCNC: 119 MG/DL
POTASSIUM SERPL-SCNC: 4.6 MMOL/L (ref 3.5–5.1)
SODIUM SERPL-SCNC: 137 MMOL/L (ref 136–145)
TRIGL SERPL-MCNC: 40 MG/DL (ref 30–150)
TSH SERPL DL<=0.005 MIU/L-ACNC: 2.98 UIU/ML (ref 0.4–4)

## 2019-05-15 PROCEDURE — 80061 LIPID PANEL: CPT | Mod: HCNC

## 2019-05-15 PROCEDURE — 36415 COLL VENOUS BLD VENIPUNCTURE: CPT | Mod: HCNC,PO

## 2019-05-15 PROCEDURE — 84443 ASSAY THYROID STIM HORMONE: CPT | Mod: HCNC

## 2019-05-15 PROCEDURE — 80048 BASIC METABOLIC PNL TOTAL CA: CPT | Mod: HCNC

## 2019-05-30 ENCOUNTER — CLINICAL SUPPORT (OUTPATIENT)
Dept: CARDIOLOGY | Facility: CLINIC | Age: 74
End: 2019-05-30
Attending: INTERNAL MEDICINE
Payer: MEDICARE

## 2019-05-30 DIAGNOSIS — R60.9 EDEMA, UNSPECIFIED TYPE: ICD-10-CM

## 2019-05-30 PROCEDURE — 93970 CAR US DOPPLER VENOUS LEGS BILATERAL: ICD-10-PCS | Mod: HCNC,S$GLB,, | Performed by: INTERNAL MEDICINE

## 2019-05-30 PROCEDURE — 93922 CAR US ANKLE BRACHIAL INDICES EXT LTD WO STR: ICD-10-PCS | Mod: HCNC,S$GLB,, | Performed by: INTERNAL MEDICINE

## 2019-05-30 PROCEDURE — 93970 EXTREMITY STUDY: CPT | Mod: HCNC,S$GLB,, | Performed by: INTERNAL MEDICINE

## 2019-05-30 PROCEDURE — 93922 UPR/L XTREMITY ART 2 LEVELS: CPT | Mod: HCNC,S$GLB,, | Performed by: INTERNAL MEDICINE

## 2019-06-02 ENCOUNTER — TELEPHONE (OUTPATIENT)
Dept: FAMILY MEDICINE | Facility: CLINIC | Age: 74
End: 2019-06-02

## 2019-06-02 DIAGNOSIS — I82.890 SUPERFICIAL VEIN THROMBOSIS: Primary | ICD-10-CM

## 2019-06-03 NOTE — TELEPHONE ENCOUNTER
Pt informed a consult with Dr. Estrada the Vascular Surgeon for superficial vein thrombosis/edema. Pt voiced understanding and was informed someone will be calling him from that department to schedule an appointment. Pt informed once call is received to states he was referred to Dr. Estrada.

## 2019-06-03 NOTE — TELEPHONE ENCOUNTER
----- Message from Goldie Nation sent at 6/3/2019  9:34 AM CDT -----  Type:  Patient Returning Call    Who Called: Marcial Hernandez  Who Left Message for Patient: Dr Pierre's nurse  Does the patient know what this is regarding?:  Possible test results  Would the patient rather a call back or a response via [facility]?  Call back  Best Call Back Number:  [phone]  Additional Information:  Please call again//[email or web address]

## 2019-06-04 LAB — VASCULAR ANKLE BRACHIAL INDEX (ABI) LEFT: 1.15 (ref 0.9–1.2)

## 2019-06-05 ENCOUNTER — TELEPHONE (OUTPATIENT)
Dept: FAMILY MEDICINE | Facility: CLINIC | Age: 74
End: 2019-06-05

## 2019-06-05 NOTE — TELEPHONE ENCOUNTER
----- Message from Sruthi Prieto sent at 6/5/2019  2:43 PM CDT -----  Contact: pt  Please call pt @ [phone] regarding referral for Vascular Surgery, pt states no one call him.

## 2019-06-12 ENCOUNTER — PATIENT MESSAGE (OUTPATIENT)
Dept: FAMILY MEDICINE | Facility: CLINIC | Age: 74
End: 2019-06-12

## 2019-06-20 ENCOUNTER — PATIENT MESSAGE (OUTPATIENT)
Dept: NEPHROLOGY | Facility: CLINIC | Age: 74
End: 2019-06-20

## 2019-06-20 ENCOUNTER — PATIENT MESSAGE (OUTPATIENT)
Dept: FAMILY MEDICINE | Facility: CLINIC | Age: 74
End: 2019-06-20

## 2019-06-20 RX ORDER — DOXAZOSIN 4 MG/1
4 TABLET ORAL NIGHTLY
Qty: 30 TABLET | Refills: 11 | Status: SHIPPED | OUTPATIENT
Start: 2019-06-20 | End: 2020-07-13 | Stop reason: SDUPTHER

## 2019-06-20 RX ORDER — DOXAZOSIN 4 MG/1
4 TABLET ORAL NIGHTLY
Qty: 30 TABLET | Refills: 11 | Status: SHIPPED | OUTPATIENT
Start: 2019-06-20 | End: 2019-11-12

## 2019-06-20 NOTE — TELEPHONE ENCOUNTER
Patient just needs a new prescription. The pharmacy told the patient that it was refused by the provider. I don't see that in the records.

## 2019-06-20 NOTE — TELEPHONE ENCOUNTER
----- Message from Skip Archer sent at 6/20/2019  2:56 PM CDT -----  ..Type:  Patient Returning Call    Who Called:pt   Who Left Message for Patient:  Does the patient know what this is regarding?:vlood pressure   Would the patient rather a call back or a response via [facility] ([facility]? Call back   Best Call Back Number: [phone]  Additional Information: pt is requesting a call form nurse to discuss refused refill on boxazosin

## 2019-09-24 ENCOUNTER — OFFICE VISIT (OUTPATIENT)
Dept: FAMILY MEDICINE | Facility: CLINIC | Age: 74
End: 2019-09-24
Payer: MEDICARE

## 2019-09-24 VITALS
DIASTOLIC BLOOD PRESSURE: 68 MMHG | SYSTOLIC BLOOD PRESSURE: 122 MMHG | WEIGHT: 167.56 LBS | OXYGEN SATURATION: 98 % | HEIGHT: 69 IN | HEART RATE: 58 BPM | BODY MASS INDEX: 24.82 KG/M2

## 2019-09-24 DIAGNOSIS — M16.11 OSTEOARTHRITIS OF RIGHT HIP, UNSPECIFIED OSTEOARTHRITIS TYPE: ICD-10-CM

## 2019-09-24 DIAGNOSIS — E23.6 EMPTY SELLA: ICD-10-CM

## 2019-09-24 DIAGNOSIS — C61 PROSTATE CANCER: ICD-10-CM

## 2019-09-24 DIAGNOSIS — N40.0 BENIGN PROSTATIC HYPERPLASIA, UNSPECIFIED WHETHER LOWER URINARY TRACT SYMPTOMS PRESENT: ICD-10-CM

## 2019-09-24 DIAGNOSIS — N25.81 HYPERPARATHYROIDISM, SECONDARY RENAL: ICD-10-CM

## 2019-09-24 DIAGNOSIS — I10 ESSENTIAL HYPERTENSION: ICD-10-CM

## 2019-09-24 DIAGNOSIS — E78.5 DYSLIPIDEMIA: ICD-10-CM

## 2019-09-24 DIAGNOSIS — Z86.010 HISTORY OF COLON POLYPS: ICD-10-CM

## 2019-09-24 DIAGNOSIS — E03.9 HYPOTHYROIDISM, UNSPECIFIED TYPE: ICD-10-CM

## 2019-09-24 DIAGNOSIS — N18.30 STAGE 3 CHRONIC KIDNEY DISEASE: ICD-10-CM

## 2019-09-24 DIAGNOSIS — Z00.00 ENCOUNTER FOR PREVENTIVE HEALTH EXAMINATION: Primary | ICD-10-CM

## 2019-09-24 PROCEDURE — 3078F DIAST BP <80 MM HG: CPT | Mod: HCNC,CPTII,S$GLB, | Performed by: NURSE PRACTITIONER

## 2019-09-24 PROCEDURE — 3074F PR MOST RECENT SYSTOLIC BLOOD PRESSURE < 130 MM HG: ICD-10-PCS | Mod: HCNC,CPTII,S$GLB, | Performed by: NURSE PRACTITIONER

## 2019-09-24 PROCEDURE — G0439 PPPS, SUBSEQ VISIT: HCPCS | Mod: HCNC,S$GLB,, | Performed by: NURSE PRACTITIONER

## 2019-09-24 PROCEDURE — 3078F PR MOST RECENT DIASTOLIC BLOOD PRESSURE < 80 MM HG: ICD-10-PCS | Mod: HCNC,CPTII,S$GLB, | Performed by: NURSE PRACTITIONER

## 2019-09-24 PROCEDURE — 99999 PR PBB SHADOW E&M-EST. PATIENT-LVL IV: CPT | Mod: PBBFAC,HCNC,, | Performed by: NURSE PRACTITIONER

## 2019-09-24 PROCEDURE — 99999 PR PBB SHADOW E&M-EST. PATIENT-LVL IV: ICD-10-PCS | Mod: PBBFAC,HCNC,, | Performed by: NURSE PRACTITIONER

## 2019-09-24 PROCEDURE — G0439 PR MEDICARE ANNUAL WELLNESS SUBSEQUENT VISIT: ICD-10-PCS | Mod: HCNC,S$GLB,, | Performed by: NURSE PRACTITIONER

## 2019-09-24 PROCEDURE — 3074F SYST BP LT 130 MM HG: CPT | Mod: HCNC,CPTII,S$GLB, | Performed by: NURSE PRACTITIONER

## 2019-09-24 NOTE — Clinical Note
Your patient was seen today for a HRA visit.  I have included a copy of my visit note, please review the note and feel free to contact me with any questions. Thank you for allowing me to participate in the care of your patients. Larisa Bruner NP

## 2019-09-24 NOTE — PROGRESS NOTES
"David Amaro presented for a  Medicare AWV and comprehensive Health Risk Assessment today. The following components were reviewed and updated:    · Medical history  · Family History  · Social history  · Allergies and Current Medications  · Health Risk Assessment  · Health Maintenance  · Care Team     ** See Completed Assessments for Annual Wellness Visit within the encounter summary.**       The following assessments were completed:  · Living Situation  · CAGE  · Depression Screening  · Timed Get Up and Go  · Whisper Test  · Cognitive Function Screening  · Nutrition Screening  · ADL Screening  · PAQ Screening    Vitals:    09/24/19 1303   BP: 122/68   Pulse: (!) 58   SpO2: 98%   Weight: 76 kg (167 lb 8.8 oz)   Height: 5' 9" (1.753 m)     Body mass index is 24.74 kg/m².  Physical Exam   Constitutional: He appears well-developed. No distress.   HENT:   Head: Normocephalic and atraumatic.   Eyes: Pupils are equal, round, and reactive to light.   Neck: Carotid bruit is not present.   Cardiovascular: Normal rate, regular rhythm, normal heart sounds, intact distal pulses and normal pulses. Exam reveals no gallop.   No murmur heard.  Pulmonary/Chest: Effort normal and breath sounds normal.   Abdominal: Soft. Bowel sounds are normal. He exhibits no distension. There is no tenderness.   Musculoskeletal: Normal range of motion. He exhibits no edema.   Neurological: He exhibits normal muscle tone. Gait normal.   Skin: Skin is warm, dry and intact.   Psychiatric: He has a normal mood and affect. His speech is normal and behavior is normal. Judgment and thought content normal. Cognition and memory are normal.   Nursing note and vitals reviewed.        Diagnoses and health risks identified today and associated recommendations/orders:    1. Encounter for preventive health examination  Completed     2. Essential hypertension  Chronic and Stable on Cardura. Continue current treatment plan as previously prescribed with your PCP an " nephrologist    3. Stage 3 chronic kidney disease  Creatinine 0.5 - 1.4 mg/dL 1.5High   1.3  1.4  1.5   Chronic and Stable. Continue current treatment plan as previously prescribed with your nephrologist and urologist     4. Prostate cancer  Chronic and Stable. S/P prostate seed inplant in 2009 Continue current treatment plan as previously prescribed with your urologist     5. Hyperparathyroidism, secondary renal  Component      Latest Ref Rng & Units 1/15/2019   PTH      9.0 - 77.0 pg/mL 144.0 (H)     6. Benign prostatic hyperplasia, unspecified whether lower urinary tract symptoms present  Chronic and Stable on Flomax. Continue current treatment plan as previously prescribed with yoururologist    7. Dyslipidemia  Chronic and Stable. Continue current treatment plan as previously prescribed with your PCP    8. Osteoarthritis of right hip, unspecified osteoarthritis type  Chronic and Stable. Continue current treatment plan as previously prescribed with your orthopedic and rheumatologist     9. Hypothyroidism, unspecified type  Chronic and Stable on Synthriod  Continue current treatment plan as previously prescribed with your    10. Empty sella   noted per  MRT  2011...INCIDENTAL NOTE MADE OF HIGH RIDING RIGHT JUGULAR BULB AND PARTIALLY   EMPTY SELLA  Chronic and Stable. Followed by PCP     11. History of colon polyps  Stable. Done 4/ 2017 - Due for a repeat in  5 years. Followed by PCP and gastroneterologist    I offered to discuss end of life issues, including information on how to make advance directives that the patient could use to name someone who would make medical decisions on their behalf if they became too ill to make themselves.  _X_Patient is interested, I provided paper work and offered to discuss    Christopher Hernandez with a 5-10 year written screening schedule and personal prevention plan. Recommendations were developed using the USPSTF age appropriate recommendations. Education, counseling, and referrals  were provided as needed. After Visit Summary printed and given to patient which includes a list of additional screenings\tests needed.    Follow up in about 1 year (around 9/24/2020).    Larisa Bruner NP     .

## 2019-11-12 ENCOUNTER — OFFICE VISIT (OUTPATIENT)
Dept: FAMILY MEDICINE | Facility: CLINIC | Age: 74
End: 2019-11-12
Payer: MEDICARE

## 2019-11-12 ENCOUNTER — LAB VISIT (OUTPATIENT)
Dept: LAB | Facility: HOSPITAL | Age: 74
End: 2019-11-12
Payer: MEDICARE

## 2019-11-12 ENCOUNTER — INITIAL CONSULT (OUTPATIENT)
Dept: DERMATOLOGY | Facility: CLINIC | Age: 74
End: 2019-11-12
Payer: MEDICARE

## 2019-11-12 VITALS
OXYGEN SATURATION: 98 % | TEMPERATURE: 97 F | WEIGHT: 171.06 LBS | BODY MASS INDEX: 25.26 KG/M2 | HEART RATE: 62 BPM | SYSTOLIC BLOOD PRESSURE: 162 MMHG | DIASTOLIC BLOOD PRESSURE: 88 MMHG

## 2019-11-12 DIAGNOSIS — L98.9 SKIN LESION: ICD-10-CM

## 2019-11-12 DIAGNOSIS — M79.604 PAIN OF RIGHT LOWER EXTREMITY: ICD-10-CM

## 2019-11-12 DIAGNOSIS — E03.9 HYPOTHYROIDISM, UNSPECIFIED TYPE: ICD-10-CM

## 2019-11-12 DIAGNOSIS — N18.30 STAGE 3 CHRONIC KIDNEY DISEASE: ICD-10-CM

## 2019-11-12 DIAGNOSIS — N40.0 BENIGN PROSTATIC HYPERPLASIA, UNSPECIFIED WHETHER LOWER URINARY TRACT SYMPTOMS PRESENT: ICD-10-CM

## 2019-11-12 DIAGNOSIS — E78.5 DYSLIPIDEMIA: ICD-10-CM

## 2019-11-12 DIAGNOSIS — I10 ESSENTIAL HYPERTENSION: Primary | ICD-10-CM

## 2019-11-12 DIAGNOSIS — L82.0 INFLAMED SEBORRHEIC KERATOSIS: Primary | ICD-10-CM

## 2019-11-12 DIAGNOSIS — I10 ESSENTIAL HYPERTENSION: ICD-10-CM

## 2019-11-12 LAB
ALBUMIN SERPL BCP-MCNC: 4.1 G/DL (ref 3.5–5.2)
ALP SERPL-CCNC: 79 U/L (ref 55–135)
ALT SERPL W/O P-5'-P-CCNC: 15 U/L (ref 10–44)
ANION GAP SERPL CALC-SCNC: 5 MMOL/L (ref 8–16)
AST SERPL-CCNC: 19 U/L (ref 10–40)
BASOPHILS # BLD AUTO: 0.05 K/UL (ref 0–0.2)
BASOPHILS NFR BLD: 1 % (ref 0–1.9)
BILIRUB SERPL-MCNC: 0.5 MG/DL (ref 0.1–1)
BUN SERPL-MCNC: 25 MG/DL (ref 8–23)
CALCIUM SERPL-MCNC: 9.5 MG/DL (ref 8.7–10.5)
CHLORIDE SERPL-SCNC: 108 MMOL/L (ref 95–110)
CO2 SERPL-SCNC: 25 MMOL/L (ref 23–29)
CREAT SERPL-MCNC: 1.5 MG/DL (ref 0.5–1.4)
DIFFERENTIAL METHOD: ABNORMAL
EOSINOPHIL # BLD AUTO: 0 K/UL (ref 0–0.5)
EOSINOPHIL NFR BLD: 0.8 % (ref 0–8)
ERYTHROCYTE [DISTWIDTH] IN BLOOD BY AUTOMATED COUNT: 12.9 % (ref 11.5–14.5)
EST. GFR  (AFRICAN AMERICAN): 52.6 ML/MIN/1.73 M^2
EST. GFR  (NON AFRICAN AMERICAN): 45.5 ML/MIN/1.73 M^2
GLUCOSE SERPL-MCNC: 83 MG/DL (ref 70–110)
HCT VFR BLD AUTO: 42.9 % (ref 40–54)
HGB BLD-MCNC: 13 G/DL (ref 14–18)
IMM GRANULOCYTES # BLD AUTO: 0.01 K/UL (ref 0–0.04)
IMM GRANULOCYTES NFR BLD AUTO: 0.2 % (ref 0–0.5)
LYMPHOCYTES # BLD AUTO: 1.5 K/UL (ref 1–4.8)
LYMPHOCYTES NFR BLD: 28 % (ref 18–48)
MCH RBC QN AUTO: 29.4 PG (ref 27–31)
MCHC RBC AUTO-ENTMCNC: 30.3 G/DL (ref 32–36)
MCV RBC AUTO: 97 FL (ref 82–98)
MONOCYTES # BLD AUTO: 0.4 K/UL (ref 0.3–1)
MONOCYTES NFR BLD: 8.4 % (ref 4–15)
NEUTROPHILS # BLD AUTO: 3.2 K/UL (ref 1.8–7.7)
NEUTROPHILS NFR BLD: 61.6 % (ref 38–73)
NRBC BLD-RTO: 0 /100 WBC
PLATELET # BLD AUTO: 195 K/UL (ref 150–350)
PMV BLD AUTO: 10.2 FL (ref 9.2–12.9)
POTASSIUM SERPL-SCNC: 4.5 MMOL/L (ref 3.5–5.1)
PROT SERPL-MCNC: 7.3 G/DL (ref 6–8.4)
RBC # BLD AUTO: 4.42 M/UL (ref 4.6–6.2)
SODIUM SERPL-SCNC: 138 MMOL/L (ref 136–145)
WBC # BLD AUTO: 5.22 K/UL (ref 3.9–12.7)

## 2019-11-12 PROCEDURE — 80053 COMPREHEN METABOLIC PANEL: CPT | Mod: HCNC

## 2019-11-12 PROCEDURE — 1101F PR PT FALLS ASSESS DOC 0-1 FALLS W/OUT INJ PAST YR: ICD-10-PCS | Mod: HCNC,CPTII,S$GLB, | Performed by: INTERNAL MEDICINE

## 2019-11-12 PROCEDURE — 99215 OFFICE O/P EST HI 40 MIN: CPT | Mod: HCNC,S$GLB,, | Performed by: INTERNAL MEDICINE

## 2019-11-12 PROCEDURE — 3078F PR MOST RECENT DIASTOLIC BLOOD PRESSURE < 80 MM HG: ICD-10-PCS | Mod: HCNC,CPTII,S$GLB, | Performed by: STUDENT IN AN ORGANIZED HEALTH CARE EDUCATION/TRAINING PROGRAM

## 2019-11-12 PROCEDURE — 3079F DIAST BP 80-89 MM HG: CPT | Mod: HCNC,CPTII,S$GLB, | Performed by: INTERNAL MEDICINE

## 2019-11-12 PROCEDURE — 3077F PR MOST RECENT SYSTOLIC BLOOD PRESSURE >= 140 MM HG: ICD-10-PCS | Mod: HCNC,CPTII,S$GLB, | Performed by: STUDENT IN AN ORGANIZED HEALTH CARE EDUCATION/TRAINING PROGRAM

## 2019-11-12 PROCEDURE — 99202 PR OFFICE/OUTPT VISIT, NEW, LEVL II, 15-29 MIN: ICD-10-PCS | Mod: 25,HCNC,S$GLB, | Performed by: STUDENT IN AN ORGANIZED HEALTH CARE EDUCATION/TRAINING PROGRAM

## 2019-11-12 PROCEDURE — 99999 PR PBB SHADOW E&M-EST. PATIENT-LVL IV: ICD-10-PCS | Mod: PBBFAC,HCNC,, | Performed by: INTERNAL MEDICINE

## 2019-11-12 PROCEDURE — 1101F PT FALLS ASSESS-DOCD LE1/YR: CPT | Mod: HCNC,CPTII,S$GLB, | Performed by: INTERNAL MEDICINE

## 2019-11-12 PROCEDURE — 99999 PR PBB SHADOW E&M-EST. PATIENT-LVL IV: CPT | Mod: PBBFAC,HCNC,, | Performed by: INTERNAL MEDICINE

## 2019-11-12 PROCEDURE — 99999 PR PBB SHADOW E&M-EST. PATIENT-LVL II: CPT | Mod: PBBFAC,HCNC,, | Performed by: STUDENT IN AN ORGANIZED HEALTH CARE EDUCATION/TRAINING PROGRAM

## 2019-11-12 PROCEDURE — 99999 PR PBB SHADOW E&M-EST. PATIENT-LVL II: ICD-10-PCS | Mod: PBBFAC,HCNC,, | Performed by: STUDENT IN AN ORGANIZED HEALTH CARE EDUCATION/TRAINING PROGRAM

## 2019-11-12 PROCEDURE — 17110 DESTRUCTION B9 LES UP TO 14: CPT | Mod: HCNC,S$GLB,, | Performed by: STUDENT IN AN ORGANIZED HEALTH CARE EDUCATION/TRAINING PROGRAM

## 2019-11-12 PROCEDURE — 3078F DIAST BP <80 MM HG: CPT | Mod: HCNC,CPTII,S$GLB, | Performed by: STUDENT IN AN ORGANIZED HEALTH CARE EDUCATION/TRAINING PROGRAM

## 2019-11-12 PROCEDURE — 3079F PR MOST RECENT DIASTOLIC BLOOD PRESSURE 80-89 MM HG: ICD-10-PCS | Mod: HCNC,CPTII,S$GLB, | Performed by: INTERNAL MEDICINE

## 2019-11-12 PROCEDURE — 3077F PR MOST RECENT SYSTOLIC BLOOD PRESSURE >= 140 MM HG: ICD-10-PCS | Mod: HCNC,CPTII,S$GLB, | Performed by: INTERNAL MEDICINE

## 2019-11-12 PROCEDURE — 85025 COMPLETE CBC W/AUTO DIFF WBC: CPT | Mod: HCNC

## 2019-11-12 PROCEDURE — 99215 PR OFFICE/OUTPT VISIT, EST, LEVL V, 40-54 MIN: ICD-10-PCS | Mod: HCNC,S$GLB,, | Performed by: INTERNAL MEDICINE

## 2019-11-12 PROCEDURE — 1101F PT FALLS ASSESS-DOCD LE1/YR: CPT | Mod: HCNC,CPTII,S$GLB, | Performed by: STUDENT IN AN ORGANIZED HEALTH CARE EDUCATION/TRAINING PROGRAM

## 2019-11-12 PROCEDURE — 3077F SYST BP >= 140 MM HG: CPT | Mod: HCNC,CPTII,S$GLB, | Performed by: STUDENT IN AN ORGANIZED HEALTH CARE EDUCATION/TRAINING PROGRAM

## 2019-11-12 PROCEDURE — 3077F SYST BP >= 140 MM HG: CPT | Mod: HCNC,CPTII,S$GLB, | Performed by: INTERNAL MEDICINE

## 2019-11-12 PROCEDURE — 36415 COLL VENOUS BLD VENIPUNCTURE: CPT | Mod: HCNC,PO

## 2019-11-12 PROCEDURE — 1101F PR PT FALLS ASSESS DOC 0-1 FALLS W/OUT INJ PAST YR: ICD-10-PCS | Mod: HCNC,CPTII,S$GLB, | Performed by: STUDENT IN AN ORGANIZED HEALTH CARE EDUCATION/TRAINING PROGRAM

## 2019-11-12 PROCEDURE — 17110 PR DESTRUCTION BENIGN LESIONS UP TO 14: ICD-10-PCS | Mod: HCNC,S$GLB,, | Performed by: STUDENT IN AN ORGANIZED HEALTH CARE EDUCATION/TRAINING PROGRAM

## 2019-11-12 PROCEDURE — 99202 OFFICE O/P NEW SF 15 MIN: CPT | Mod: 25,HCNC,S$GLB, | Performed by: STUDENT IN AN ORGANIZED HEALTH CARE EDUCATION/TRAINING PROGRAM

## 2019-11-12 NOTE — PATIENT INSTRUCTIONS

## 2019-11-12 NOTE — PROGRESS NOTES
Subjective:       Patient ID:  David Amaro is a 74 y.o. male who presents for   Chief Complaint   Patient presents with    Lesion     History of Present Illness: The patient presents with chief complaint of a skin lesion.  Location: right arm  Duration: 4 months  Signs/Symptoms: crusty, itchy   Prior treatments: clotrimazone, betamethasone, dipropionate cream 1%      Review of Systems   Skin: Negative for rash and daily sunscreen use.        Objective:    Physical Exam   Constitutional: He appears well-developed and well-nourished. No distress.   Neurological: He is alert and oriented to person, place, and time. He is not disoriented.   Psychiatric: He has a normal mood and affect.   Skin:   Areas Examined (abnormalities noted in diagram):   RUE Inspected  LUE Inspection Performed              Diagram Legend     Erythematous scaling macule/papule c/w actinic keratosis       Vascular papule c/w angioma      Pigmented verrucoid papule/plaque c/w seborrheic keratosis      Yellow umbilicated papule c/w sebaceous hyperplasia      Irregularly shaped tan macule c/w lentigo     1-2 mm smooth white papules consistent with Milia      Movable subcutaneous cyst with punctum c/w epidermal inclusion cyst      Subcutaneous movable cyst c/w pilar cyst      Firm pink to brown papule c/w dermatofibroma      Pedunculated fleshy papule(s) c/w skin tag(s)      Evenly pigmented macule c/w junctional nevus     Mildly variegated pigmented, slightly irregular-bordered macule c/w mildly atypical nevus      Flesh colored to evenly pigmented papule c/w intradermal nevus       Pink pearly papule/plaque c/w basal cell carcinoma      Erythematous hyperkeratotic cursted plaque c/w SCC      Surgical scar with no sign of skin cancer recurrence      Open and closed comedones      Inflammatory papules and pustules      Verrucoid papule consistent consistent with wart     Erythematous eczematous patches and plaques     Dystrophic onycholytic  nail with subungual debris c/w onychomycosis     Umbilicated papule    Erythematous-base heme-crusted tan verrucoid plaque consistent with inflamed seborrheic keratosis     Erythematous Silvery Scaling Plaque c/w Psoriasis     See annotation      Assessment / Plan:        Inflamed seborrheic keratosis  Cryosurgery procedure note:    Verbal consent from the patient is obtained including, but not limited to, risk of hypopigmentation/hyperpigmentation, scar, recurrence of lesion. Liquid nitrogen cryosurgery is applied to 1 lesions to produce a freeze injury. The patient is aware that blisters may form and is instructed on wound care with gentle cleansing and use of vaseline ointment to keep moist until healed. The patient is supplied a handout on cryosurgery and is instructed to call if lesions do not completely resolve.             Follow up if symptoms worsen or fail to improve.

## 2019-11-12 NOTE — LETTER
November 12, 2019      Stepan Pierre MD  [address] [place] [address]           [facility]  [address] [address]  [place] [address]  Phone: [phone]  Fax: [phone]          Patient: David Amaro   MR Number: [id number]   YOB: 1945   Date of Visit: 11/12/2019       Dear Dr. Stepan Pierre:    Thank you for referring David Amaro to me for evaluation. Attached you will find relevant portions of my assessment and plan of care.    If you have questions, please do not hesitate to call me. I look forward to following David Amaro along with you.    Sincerely,    Ovi Cordoba MD    Enclosure  CC:  No Recipients    If you would like to receive this communication electronically, please contact [email or web address] or [phone] to request more information on e(ye)BRAIN Link access.    For providers and/or their staff who would like to refer a patient to [facility], please contact us through our one-stop-shop provider referral line, [facility], at [phone].    If you feel you have received this communication in error or would no longer like to receive these types of communications, please e-mail [email or web address]

## 2019-11-12 NOTE — PROGRESS NOTES
Subjective:       Patient ID: David Amaro is a 74 y.o. male.    Chief Complaint: Follow-up; Hypertension; Hyperlipidemia; Hypothyroidism; Benign Prostatic Hypertrophy; and Chronic Kidney Disease    Follow-up   Pertinent negatives include no abdominal pain, arthralgias, chest pain, chills, coughing, diaphoresis, fatigue, fever, headaches, joint swelling, myalgias, nausea, neck pain, numbness, rash, sore throat, vomiting or weakness.   Hypertension   Pertinent negatives include no chest pain, headaches, neck pain, palpitations or shortness of breath.   Hyperlipidemia   Pertinent negatives include no chest pain, myalgias or shortness of breath.   Benign Prostatic Hypertrophy   Irritative symptoms do not include frequency or urgency. Pertinent negatives include no chills, dysuria, hematuria, nausea or vomiting.     Past Medical History:   Diagnosis Date    Arthritis     Cancer     prostate    CKD (chronic kidney disease)     Dyslipidemia     HTN (hypertension)     Hyperlipidemia     Hypothyroidism     Prostate cancer     Thyroid disease      Past Surgical History:   Procedure Laterality Date    COLONOSCOPY N/A 4/3/2017    Procedure: COLONOSCOPY;  Surgeon: Olivier Grayson MD;  Location[facility];  Service: Endoscopy;  Laterality: N/A;    HERNIA REPAIR  2009 approx    seed implants in prostate      TONSILLECTOMY       Family History   Problem Relation Age of Onset    Stroke Mother     Cancer Mother     Colon cancer Mother     Diabetes Father     Cancer Sister         lung    Cancer Brother     Bone cancer Brother     Heart disease Maternal Grandmother      Social History     Socioeconomic History    Marital status:      Spouse name: Not on file    Number of children: Not on file    Years of education: Not on file    Highest education level: Not on file   Occupational History    Not on file   Social Needs    Financial resource strain: Not on file    Food insecurity:     Worry: Not  on file     Inability: Not on file    Transportation needs:     Medical: Not on file     Non-medical: Not on file   Tobacco Use    Smoking status: Never Smoker    Smokeless tobacco: Never Used   Substance and Sexual Activity    Alcohol use: No    Drug use: No    Sexual activity: Yes     Partners: Female   Lifestyle    Physical activity:     Days per week: Not on file     Minutes per session: Not on file    Stress: Not on file   Relationships    Social connections:     Talks on phone: Not on file     Gets together: Not on file     Attends Yazidism service: Not on file     Active member of club or organization: Not on file     Attends meetings of clubs or organizations: Not on file     Relationship status: Not on file   Other Topics Concern    Not on file   Social History Narrative    Not on file     Review of Systems   Constitutional: Negative for activity change, appetite change, chills, diaphoresis, fatigue, fever and unexpected weight change.   HENT: Negative for drooling, ear discharge, ear pain, facial swelling, hearing loss, mouth sores, nosebleeds, postnasal drip, rhinorrhea, sinus pressure, sneezing, sore throat, tinnitus, trouble swallowing and voice change.    Eyes: Negative for photophobia, redness and visual disturbance.   Respiratory: Negative for apnea, cough, choking, chest tightness, shortness of breath and wheezing.    Cardiovascular: Negative for chest pain, palpitations and leg swelling.   Gastrointestinal: Negative for abdominal distention, abdominal pain, anal bleeding, blood in stool, constipation, diarrhea, nausea, rectal pain and vomiting.   Endocrine: Negative for cold intolerance, heat intolerance, polydipsia, polyphagia and polyuria.   Genitourinary: Negative for difficulty urinating, dysuria, enuresis, flank pain, frequency, genital sores, hematuria and urgency.   Musculoskeletal: Negative for arthralgias, back pain, gait problem, joint swelling, myalgias, neck pain and neck  stiffness.        Chronic right hip/leg pain-------positional   Skin: Negative for color change, pallor, rash and wound.   Allergic/Immunologic: Negative for food allergies and immunocompromised state.   Neurological: Negative for dizziness, tremors, seizures, syncope, facial asymmetry, speech difficulty, weakness, light-headedness, numbness and headaches.   Hematological: Negative for adenopathy. Does not bruise/bleed easily.   Psychiatric/Behavioral: Negative for agitation, behavioral problems, confusion, decreased concentration, dysphoric mood, hallucinations, self-injury, sleep disturbance and suicidal ideas. The patient is not nervous/anxious and is not hyperactive.        Objective:      Physical Exam   Constitutional: He is oriented to person, place, and time. He appears well-developed and well-nourished. No distress.   HENT:   Head: Normocephalic and atraumatic.   Eyes: Pupils are equal, round, and reactive to light. No scleral icterus.   Neck: Normal range of motion. Neck supple. No JVD present. Carotid bruit is not present. No tracheal deviation present. No thyromegaly present.   Cardiovascular: Normal rate, regular rhythm, normal heart sounds and intact distal pulses.   Pulmonary/Chest: Effort normal and breath sounds normal. No respiratory distress. He has no wheezes. He has no rales. He exhibits no tenderness.   Abdominal: Soft. Bowel sounds are normal. He exhibits no distension. There is no tenderness. There is no rebound and no guarding.   Musculoskeletal: Normal range of motion. He exhibits no edema or tenderness.   Lymphadenopathy:     He has no cervical adenopathy.   Neurological: He is alert and oriented to person, place, and time.   Skin: Skin is warm and dry. No rash noted. He is not diaphoretic. No erythema. No pallor.   Psychiatric: He has a normal mood and affect. His behavior is normal. Judgment and thought content normal.   Nursing note and vitals reviewed.      CMP  Sodium   Date Value Ref  Range Status   05/15/2019 137 136 - 145 mmol/L Final     Potassium   Date Value Ref Range Status   05/15/2019 4.6 3.5 - 5.1 mmol/L Final     Chloride   Date Value Ref Range Status   05/15/2019 109 95 - 110 mmol/L Final     CO2   Date Value Ref Range Status   05/15/2019 21 (L) 23 - 29 mmol/L Final     Glucose   Date Value Ref Range Status   05/15/2019 84 70 - 110 mg/dL Final     BUN, Bld   Date Value Ref Range Status   05/15/2019 26 (H) 8 - 23 mg/dL Final     Creatinine   Date Value Ref Range Status   05/15/2019 1.5 (H) 0.5 - 1.4 mg/dL Final     Calcium   Date Value Ref Range Status   05/15/2019 9.0 8.7 - 10.5 mg/dL Final     Total Protein   Date Value Ref Range Status   11/14/2018 6.8 6.0 - 8.4 g/dL Final     Albumin   Date Value Ref Range Status   01/15/2019 3.8 3.5 - 5.2 g/dL Final     Total Bilirubin   Date Value Ref Range Status   11/14/2018 0.4 0.1 - 1.0 mg/dL Final     Comment:     For infants and newborns, interpretation of results should be based  on gestational age, weight and in agreement with clinical  observations.  Premature Infant recommended reference ranges:  Up to 24 hours.............<8.0 mg/dL  Up to 48 hours............<12.0 mg/dL  3-5 days..................<15.0 mg/dL  6-29 days.................<15.0 mg/dL       Alkaline Phosphatase   Date Value Ref Range Status   11/14/2018 84 55 - 135 U/L Final     AST   Date Value Ref Range Status   11/14/2018 22 10 - 40 U/L Final     ALT   Date Value Ref Range Status   11/14/2018 18 10 - 44 U/L Final     Anion Gap   Date Value Ref Range Status   05/15/2019 7 (L) 8 - 16 mmol/L Final     eGFR if    Date Value Ref Range Status   05/15/2019 53.0 (A) >60 mL/min/1.73 m^2 Final     eGFR if non    Date Value Ref Range Status   05/15/2019 45.9 (A) >60 mL/min/1.73 m^2 Final     Comment:     Calculation used to obtain the estimated glomerular filtration  rate (eGFR) is the CKD-EPI equation.        Lab Results   Component Value Date     WBC 6.35 01/15/2019    HGB 12.7 (L) 01/15/2019    HCT 40.2 01/15/2019    MCV 95 01/15/2019     01/15/2019     Lab Results   Component Value Date    CHOL 196 05/15/2019     Lab Results   Component Value Date    HDL 77 (H) 05/15/2019     Lab Results   Component Value Date    LDLCALC 111.0 05/15/2019     Lab Results   Component Value Date    TRIG 40 05/15/2019     Lab Results   Component Value Date    CHOLHDL 39.3 05/15/2019     Lab Results   Component Value Date    TSH 2.982 05/15/2019     Lab Results   Component Value Date    HGBA1C 5.7 12/20/2005     Assessment:       1. Essential hypertension    2. Hypothyroidism, unspecified type    3. Stage 3 chronic kidney disease    4. Dyslipidemia    5. Benign prostatic hyperplasia, unspecified whether lower urinary tract symptoms present    6. Skin lesion    7. Pain of right lower extremity        Plan:   Essential hypertension---------continue current meds---------------record at home and report---------  -     Comprehensive metabolic panel; Future; Expected date: 11/12/2019  -     CBC auto differential; Future; Expected date: 11/12/2019    Hypothyroidism, unspecified type----------stable--    Stage 3 chronic kidney disease----------stable-    Dyslipidemia-----------stable------    Benign prostatic hyperplasia, unspecified whether lower urinary tract symptoms present-------------sees urology----------    Skin lesion  -     Ambulatory referral to Dermatology    Pain of right lower extremity  -     Ambulatory referral to Pain Clinic    Other orders  -     Hypertension Digital Medicine (HDMP) Enrollment Order  -     Hypertension Digital Medicine (HDMP) Enrollment Order    F/u 1 month----------bp check------------

## 2019-11-15 ENCOUNTER — OFFICE VISIT (OUTPATIENT)
Dept: PAIN MEDICINE | Facility: CLINIC | Age: 74
End: 2019-11-15
Payer: MEDICARE

## 2019-11-15 ENCOUNTER — HOSPITAL ENCOUNTER (OUTPATIENT)
Dept: RADIOLOGY | Facility: HOSPITAL | Age: 74
Discharge: HOME | End: 2019-11-15
Attending: PHYSICAL MEDICINE & REHABILITATION
Payer: MEDICARE

## 2019-11-15 ENCOUNTER — TELEPHONE (OUTPATIENT)
Dept: PAIN MEDICINE | Facility: CLINIC | Age: 74
End: 2019-11-15

## 2019-11-15 VITALS
SYSTOLIC BLOOD PRESSURE: 166 MMHG | BODY MASS INDEX: 24.95 KG/M2 | HEIGHT: 69 IN | DIASTOLIC BLOOD PRESSURE: 89 MMHG | WEIGHT: 168.44 LBS | HEART RATE: 73 BPM

## 2019-11-15 DIAGNOSIS — M25.559 CHRONIC HIP PAIN, UNSPECIFIED LATERALITY: ICD-10-CM

## 2019-11-15 DIAGNOSIS — M16.11 PRIMARY OSTEOARTHRITIS OF RIGHT HIP: ICD-10-CM

## 2019-11-15 DIAGNOSIS — M25.559 CHRONIC HIP PAIN, UNSPECIFIED LATERALITY: Primary | ICD-10-CM

## 2019-11-15 DIAGNOSIS — G89.29 CHRONIC HIP PAIN, UNSPECIFIED LATERALITY: ICD-10-CM

## 2019-11-15 DIAGNOSIS — G89.29 CHRONIC HIP PAIN, UNSPECIFIED LATERALITY: Primary | ICD-10-CM

## 2019-11-15 PROCEDURE — 3077F PR MOST RECENT SYSTOLIC BLOOD PRESSURE >= 140 MM HG: ICD-10-PCS | Mod: HCNC,CPTII,S$GLB, | Performed by: PHYSICAL MEDICINE & REHABILITATION

## 2019-11-15 PROCEDURE — 99999 PR PBB SHADOW E&M-EST. PATIENT-LVL III: ICD-10-PCS | Mod: PBBFAC,HCNC,, | Performed by: PHYSICAL MEDICINE & REHABILITATION

## 2019-11-15 PROCEDURE — 99204 PR OFFICE/OUTPT VISIT, NEW, LEVL IV, 45-59 MIN: ICD-10-PCS | Mod: HCNC,S$GLB,, | Performed by: PHYSICAL MEDICINE & REHABILITATION

## 2019-11-15 PROCEDURE — 73502 X-RAY EXAM HIP UNI 2-3 VIEWS: CPT | Mod: TC,HCNC,RT

## 2019-11-15 PROCEDURE — 3079F PR MOST RECENT DIASTOLIC BLOOD PRESSURE 80-89 MM HG: ICD-10-PCS | Mod: HCNC,CPTII,S$GLB, | Performed by: PHYSICAL MEDICINE & REHABILITATION

## 2019-11-15 PROCEDURE — 1101F PR PT FALLS ASSESS DOC 0-1 FALLS W/OUT INJ PAST YR: ICD-10-PCS | Mod: HCNC,CPTII,S$GLB, | Performed by: PHYSICAL MEDICINE & REHABILITATION

## 2019-11-15 PROCEDURE — 99999 PR PBB SHADOW E&M-EST. PATIENT-LVL III: CPT | Mod: PBBFAC,HCNC,, | Performed by: PHYSICAL MEDICINE & REHABILITATION

## 2019-11-15 PROCEDURE — 73502 X-RAY EXAM HIP UNI 2-3 VIEWS: CPT | Mod: 26,HCNC,RT, | Performed by: RADIOLOGY

## 2019-11-15 PROCEDURE — 3079F DIAST BP 80-89 MM HG: CPT | Mod: HCNC,CPTII,S$GLB, | Performed by: PHYSICAL MEDICINE & REHABILITATION

## 2019-11-15 PROCEDURE — 73502 XR HIP 2 VIEW RIGHT: ICD-10-PCS | Mod: 26,HCNC,RT, | Performed by: RADIOLOGY

## 2019-11-15 PROCEDURE — 99204 OFFICE O/P NEW MOD 45 MIN: CPT | Mod: HCNC,S$GLB,, | Performed by: PHYSICAL MEDICINE & REHABILITATION

## 2019-11-15 PROCEDURE — 3077F SYST BP >= 140 MM HG: CPT | Mod: HCNC,CPTII,S$GLB, | Performed by: PHYSICAL MEDICINE & REHABILITATION

## 2019-11-15 PROCEDURE — 1101F PT FALLS ASSESS-DOCD LE1/YR: CPT | Mod: HCNC,CPTII,S$GLB, | Performed by: PHYSICAL MEDICINE & REHABILITATION

## 2019-11-15 NOTE — PROGRESS NOTES
New Patient Chronic Pain Note (Initial Visit)    Referring Physician: Stepan Pierre*    PCP: Stepan Pierre MD    Chief Complaint:   Chief Complaint   Patient presents with    Hip Pain     right hip, x 1 month, mostly when he gets up from sitting        SUBJECTIVE:    David Amaro is a 74 y.o. male who presents to the clinic for the evaluation of right hip pain. He was referred by his primary care physician for evaluation management of the right hip pain. Of note patient does have a history of prostate cancer.  The pain started about one month ago without any injury or trauma and symptoms have been worsening. He also reports having bilateral shin pain that he attributes to performing stair exercises. The pain is located in the right hip area without radiation. He has separate shin pain bilaterally.  The pain is described as aching and sharp and is rated as 6/10. The pain is rated with a score of  0/10 on the BEST day and a score of 8/10 on the WORST day.  Symptoms interfere with daily activity. The pain is exacerbated by sitting down, getting up from sitting, and use of stairs.  The pain is mitigated by elevating his leg, standing up, and walking. The patient reports spending less than 2 hours per day reclining. The patient reports 6-8 hours of uninterrupted sleep per night.    Patient denies night fever/night sweats, urinary incontinence, bowel incontinence, significant weight loss, significant motor weakness and loss of sensations.    Non-Pharmacologic Treatments:  Physical Therapy/Home Exercise: no  Ice/Heat:no  TENS: no  Acupuncture: no  Massage: no  Chiropractic: no    Other: no      Pain Medications:  Patient reports that he has not been taking anything for his pain     report:  Reviewed and consistent with medication use as prescribed.        Pain Procedures:  Denies      Imaging:   XR Lumbar Spine 6/3/13:  As compared with 6/17/08, no significant interim change of the lumbar    spine is identified.  There has been interim placement of multiple   brachytherapy implants within the prostate.    No available imaging of the right hip itself      Past Medical History:   Diagnosis Date    Arthritis     Cancer     prostate    CKD (chronic kidney disease)     Dyslipidemia     HTN (hypertension)     Hyperlipidemia     Hypothyroidism     Prostate cancer     Radiation induced proctitis     Thyroid disease      Past Surgical History:   Procedure Laterality Date    COLONOSCOPY N/A 4/3/2017    Procedure: COLONOSCOPY;  Surgeon: Olivier Grayson MD;  Location: [facility];  Service: Endoscopy;  Laterality: N/A;    HERNIA REPAIR  2009 approx    seed implants in prostate      TONSILLECTOMY       Social History     Socioeconomic History    Marital status:      Spouse name: Not on file    Number of children: Not on file    Years of education: Not on file    Highest education level: Not on file   Occupational History    Not on file   Social Needs    Financial resource strain: Not on file    Food insecurity:     Worry: Not on file     Inability: Not on file    Transportation needs:     Medical: Not on file     Non-medical: Not on file   Tobacco Use    Smoking status: Never Smoker    Smokeless tobacco: Never Used   Substance and Sexual Activity    Alcohol use: No    Drug use: No    Sexual activity: Yes     Partners: Female   Lifestyle    Physical activity:     Days per week: Not on file     Minutes per session: Not on file    Stress: Not on file   Relationships    Social connections:     Talks on phone: Not on file     Gets together: Not on file     Attends Confucianist service: Not on file     Active member of club or organization: Not on file     Attends meetings of clubs or organizations: Not on file     Relationship status: Not on file   Other Topics Concern    Not on file   Social History Narrative    Not on file     Family History   Problem Relation Age of Onset    Stroke  "Mother     Cancer Mother     Colon cancer Mother     Diabetes Father     Cancer Sister         lung    Cancer Brother     Bone cancer Brother     Heart disease Maternal Grandmother        Review of patient's allergies indicates:   Allergen Reactions    No known drug allergies        Current Outpatient Medications   Medication Sig    doxazosin (CARDURA) 4 MG tablet Take 1 tablet (4 mg total) by mouth every evening.    levothyroxine (SYNTHROID) 25 MCG tablet Take 1 tablet (25 mcg total) by mouth once daily.    tamsulosin (FLOMAX) 0.4 mg Cp24 Take 0.4 mg by mouth once daily.    calcium-vitamin D3 (OS-GERMAIN 500 + D3) 500 mg(1,250mg) -200 unit per tablet Take 1 tablet by mouth every other day.     No current facility-administered medications for this visit.        Review of Systems       GENERAL:  No weight loss, malaise or fevers.  HEENT:   No recent changes in vision or hearing  NECK:  Negative for lumps, no difficulty with swallowing.  RESPIRATORY:  Negative for cough, wheezing or shortness of breath, patient denies any recent URI.  CARDIOVASCULAR:  Negative for chest pain, leg swelling or palpitations.  GI:  Negative for abdominal discomfort, blood in stools or black stools or change in bowel habits.  MUSCULOSKELETAL:  See HPI.  SKIN:  Negative for lesions, rash, and itching.  PSYCH:  No mood disorder or recent psychosocial stressors.  Patients sleep is not disturbed secondary to pain.  HEMATOLOGY/LYMPHOLOGY:  Negative for prolonged bleeding, bruising easily or swollen nodes.  Patient is not currently taking any anti-coagulants  NEURO:   No history of headaches, syncope, paralysis, seizures or tremors.  All other reviewed and negative other than HPI.    OBJECTIVE:    BP (!) 166/89 (BP Location: Right arm, Patient Position: Sitting, BP Method: Medium (Manual))   Pulse 73   Ht 5' 9" (1.753 m)   Wt 76.4 kg (168 lb 6.9 oz)   BMI 24.87 kg/m²         Physical Exam      GENERAL: Well appearing, in no acute " distress, alert and oriented x3.  PSYCH:  Mood and affect appropriate.  SKIN: Skin color, texture, turgor normal, no rashes or lesions.  HEAD/FACE:  Normocephalic, atraumatic. Cranial nerves grossly intact.  NECK: No pain to palpation over the cervical paraspinous muscles. Spurling Negative. No pain with neck flexion, extension, or lateral flexion.   CV: RRR with palpation of the radial artery.  PULM: No evidence of respiratory difficulty, symmetric chest rise.  GI:  Soft and non-tender.  BACK: Straight leg raising in the sitting and supine positions is negative to radicular pain. No pain to palpation over the facet joints of the lumbar spine or spinous processes. Normal range of motion without pain reproduction.  EXTREMITIES: Peripheral joint ROM is full and pain free without obvious instability or laxity in all four extremities with the exception of limited internal rotation of the right hip when compared to the left that also reproduced groin pain and some buttock pain. No deformities, edema, or skin discoloration. Good capillary refill.  MUSCULOSKELETAL: Shoulder, hip, and knee provocative maneuvers are negative.  There is no pain with palpation over the sacroiliac joints bilaterally.  FABERs test is negative.  FADIRs test is negative.   Bilateral upper and lower extremity strength is normal and symmetric.  No atrophy or tone abnormalities are noted.  NEURO: Bilateral upper and lower extremity coordination and muscle stretch reflexes are physiologic and symmetric.  Plantar response are downgoing. No clonus.  No loss of sensation is noted.  GAIT: normal.      LABS:  Lab Results   Component Value Date    WBC 5.22 11/12/2019    HGB 13.0 (L) 11/12/2019    HCT 42.9 11/12/2019    MCV 97 11/12/2019     11/12/2019       CMP  Sodium   Date Value Ref Range Status   11/12/2019 138 136 - 145 mmol/L Final     Potassium   Date Value Ref Range Status   11/12/2019 4.5 3.5 - 5.1 mmol/L Final     Chloride   Date Value Ref  Range Status   11/12/2019 108 95 - 110 mmol/L Final     CO2   Date Value Ref Range Status   11/12/2019 25 23 - 29 mmol/L Final     Glucose   Date Value Ref Range Status   11/12/2019 83 70 - 110 mg/dL Final     BUN, Bld   Date Value Ref Range Status   11/12/2019 25 (H) 8 - 23 mg/dL Final     Creatinine   Date Value Ref Range Status   11/12/2019 1.5 (H) 0.5 - 1.4 mg/dL Final     Calcium   Date Value Ref Range Status   11/12/2019 9.5 8.7 - 10.5 mg/dL Final     Total Protein   Date Value Ref Range Status   11/12/2019 7.3 6.0 - 8.4 g/dL Final     Albumin   Date Value Ref Range Status   11/12/2019 4.1 3.5 - 5.2 g/dL Final     Total Bilirubin   Date Value Ref Range Status   11/12/2019 0.5 0.1 - 1.0 mg/dL Final     Comment:     For infants and newborns, interpretation of results should be based  on gestational age, weight and in agreement with clinical  observations.  Premature Infant recommended reference ranges:  Up to 24 hours.............<8.0 mg/dL  Up to 48 hours............<12.0 mg/dL  3-5 days..................<15.0 mg/dL  6-29 days.................<15.0 mg/dL       Alkaline Phosphatase   Date Value Ref Range Status   11/12/2019 79 55 - 135 U/L Final     AST   Date Value Ref Range Status   11/12/2019 19 10 - 40 U/L Final     ALT   Date Value Ref Range Status   11/12/2019 15 10 - 44 U/L Final     Anion Gap   Date Value Ref Range Status   11/12/2019 5 (L) 8 - 16 mmol/L Final     eGFR if    Date Value Ref Range Status   11/12/2019 52.6 (A) >60 mL/min/1.73 m^2 Final     eGFR if non    Date Value Ref Range Status   11/12/2019 45.5 (A) >60 mL/min/1.73 m^2 Final     Comment:     Calculation used to obtain the estimated glomerular filtration  rate (eGFR) is the CKD-EPI equation.          Lab Results   Component Value Date    HGBA1C 5.7 12/20/2005             ASSESSMENT: 74 y.o. year old male with right hip pain, consistent with     1. Chronic hip pain, unspecified laterality  X-Ray Hip 4 or  more views Right    Ambulatory referral to Physical Therapy   2. Primary osteoarthritis of right hip  X-Ray Hip 4 or more views Right    Ambulatory referral to Physical Therapy         PLAN:   - Interventions: Consider performing an intra-articular hip joint injection in the future if warranted.     - Anticoagulation use: no     - Medications: I have stressed the importance of physical activity and a home exercise plan to help with pain and improve health. and I do not feel this patient is a candidate for long term opioids for this non-cancer pain at this time     - Therapy: Referral to Physical therapy for right hip stabilization, range of motion exercises, strengthening, stretching, and a home exercise program.    - Labs:  Reviewed    - Imaging: Reviewed available imaging with patient and answered any questions they had regarding study.  Obtain dedicated right hip x-rays for further evaluation for presumed osteoarthritis of the hip    - Consults/Referrals:  Physical therapy    - Records:  Reviewed/Obtain old records from outside physicians and imaging.  Have patient complete release information form in order to obtain records from [facility]    - Follow up visit: return to clinic in 3 months    - Counseled patient regarding the importance of activity modification and physical therapy    - Patient Questions: Answered all of the patient's questions regarding diagnosis, therapy, and treatment        The above plan and management options were discussed at length with patient. Patient is in agreement with the above and verbalized understanding.    I discussed the goals of interventional chronic pain management with the patient on today's visit.  I explained the utility of injections for diagnostic and therapeutic purposes.  We discussed a multimodal approach to pain including treating the patient's given worst pain at any given time.  We will use a systematic approach to addressing pain.  We will  also adopt a multimodal approach that includes injections, adjuvant medications, physical therapy, at times psychiatry.  There may be a limited role for opioid use intermittently in the treatment of pain, more particularly for acute pain although no one approach can be used as a sole treatment modality.    I emphasized the importance of regular exercise, core strengthening and stretching, diet and weight loss as a cornerstone of long-term pain management.      Richy Marie MD  Interventional Pain Management  [facility]

## 2019-11-15 NOTE — PATIENT INSTRUCTIONS
- obtain x-ray of the right hip for further evaluation  - obtain signature for release of information to obtain records from [facility]  -referral (external) placed for physical therapy to evaluate and treat right hip pain  - Continue current medications as prescribed.  - Continue home based exercises and discussed the importance of a healthy and active lifestyle  - Return for follow up in 3 months.    Please do not hesitate to contact the clinic if you have any questions regarding your treatment plan.     Richy Marie MD  Interventional Pain Medicine  [facility] [place]

## 2019-11-15 NOTE — LETTER
November 15, 2019      Stepan Pierre MD  [address] [place] [address]           [facility]  [address] [address]  [place] [address]  Phone: [phone]  Fax: [phone]          Patient: David Amaro   MR Number: [id number]   YOB: 1945   Date of Visit: 11/15/2019       Dear Dr. Stepan Pierre:    Thank you for referring David Amaro to me for evaluation. Attached you will find relevant portions of my assessment and plan of care.    If you have questions, please do not hesitate to call me. I look forward to following David Amaro along with you.    Sincerely,    Richy Marie MD    Enclosure  CC:  No Recipients    If you would like to receive this communication electronically, please contact [email or web address] or [phone] to request more information on [employer] access.    For providers and/or their staff who would like to refer a patient to [facility], please contact us through our one-stop-shop provider referral line, [facility], at [phone].    If you feel you have received this communication in error or would no longer like to receive these types of communications, please e-mail [email or web address]

## 2019-12-02 ENCOUNTER — TELEPHONE (OUTPATIENT)
Dept: PAIN MEDICINE | Facility: CLINIC | Age: 74
End: 2019-12-02

## 2019-12-02 NOTE — TELEPHONE ENCOUNTER
Spoke with Ms. Ruiz from Physical Therapy and she stated they needed  Signature on referral and informed her I would get him to sign and we will fax. All questions answered.

## 2019-12-02 NOTE — TELEPHONE ENCOUNTER
----- Message from Yadi Wheeler sent at 12/2/2019  2:28 PM CST -----  Contact: [facility]  They are requesting call back in regards to needing new pt referral faxed over to them.      Pls call back at [phone]  Fax:[phone]

## 2019-12-10 ENCOUNTER — TELEPHONE (OUTPATIENT)
Dept: PAIN MEDICINE | Facility: CLINIC | Age: 74
End: 2019-12-10

## 2019-12-10 ENCOUNTER — OFFICE VISIT (OUTPATIENT)
Dept: FAMILY MEDICINE | Facility: CLINIC | Age: 74
End: 2019-12-10
Payer: MEDICARE

## 2019-12-10 VITALS
HEART RATE: 77 BPM | DIASTOLIC BLOOD PRESSURE: 80 MMHG | BODY MASS INDEX: 25.18 KG/M2 | OXYGEN SATURATION: 98 % | SYSTOLIC BLOOD PRESSURE: 138 MMHG | TEMPERATURE: 98 F | WEIGHT: 170.5 LBS

## 2019-12-10 DIAGNOSIS — I10 ESSENTIAL HYPERTENSION: ICD-10-CM

## 2019-12-10 DIAGNOSIS — N18.30 STAGE 3 CHRONIC KIDNEY DISEASE: Primary | ICD-10-CM

## 2019-12-10 DIAGNOSIS — N40.0 BENIGN PROSTATIC HYPERPLASIA, UNSPECIFIED WHETHER LOWER URINARY TRACT SYMPTOMS PRESENT: ICD-10-CM

## 2019-12-10 DIAGNOSIS — E78.5 DYSLIPIDEMIA: ICD-10-CM

## 2019-12-10 PROCEDURE — 99214 PR OFFICE/OUTPT VISIT, EST, LEVL IV, 30-39 MIN: ICD-10-PCS | Mod: HCNC,S$GLB,, | Performed by: INTERNAL MEDICINE

## 2019-12-10 PROCEDURE — 1126F PR PAIN SEVERITY QUANTIFIED, NO PAIN PRESENT: ICD-10-PCS | Mod: HCNC,S$GLB,, | Performed by: INTERNAL MEDICINE

## 2019-12-10 PROCEDURE — 3079F PR MOST RECENT DIASTOLIC BLOOD PRESSURE 80-89 MM HG: ICD-10-PCS | Mod: HCNC,CPTII,S$GLB, | Performed by: INTERNAL MEDICINE

## 2019-12-10 PROCEDURE — 99214 OFFICE O/P EST MOD 30 MIN: CPT | Mod: HCNC,S$GLB,, | Performed by: INTERNAL MEDICINE

## 2019-12-10 PROCEDURE — 1159F MED LIST DOCD IN RCRD: CPT | Mod: HCNC,S$GLB,, | Performed by: INTERNAL MEDICINE

## 2019-12-10 PROCEDURE — 1159F PR MEDICATION LIST DOCUMENTED IN MEDICAL RECORD: ICD-10-PCS | Mod: HCNC,S$GLB,, | Performed by: INTERNAL MEDICINE

## 2019-12-10 PROCEDURE — 1101F PT FALLS ASSESS-DOCD LE1/YR: CPT | Mod: HCNC,CPTII,S$GLB, | Performed by: INTERNAL MEDICINE

## 2019-12-10 PROCEDURE — 3075F SYST BP GE 130 - 139MM HG: CPT | Mod: HCNC,CPTII,S$GLB, | Performed by: INTERNAL MEDICINE

## 2019-12-10 PROCEDURE — 3075F PR MOST RECENT SYSTOLIC BLOOD PRESS GE 130-139MM HG: ICD-10-PCS | Mod: HCNC,CPTII,S$GLB, | Performed by: INTERNAL MEDICINE

## 2019-12-10 PROCEDURE — 1101F PR PT FALLS ASSESS DOC 0-1 FALLS W/OUT INJ PAST YR: ICD-10-PCS | Mod: HCNC,CPTII,S$GLB, | Performed by: INTERNAL MEDICINE

## 2019-12-10 PROCEDURE — 99999 PR PBB SHADOW E&M-EST. PATIENT-LVL III: CPT | Mod: PBBFAC,HCNC,, | Performed by: INTERNAL MEDICINE

## 2019-12-10 PROCEDURE — 99999 PR PBB SHADOW E&M-EST. PATIENT-LVL III: ICD-10-PCS | Mod: PBBFAC,HCNC,, | Performed by: INTERNAL MEDICINE

## 2019-12-10 PROCEDURE — 1126F AMNT PAIN NOTED NONE PRSNT: CPT | Mod: HCNC,S$GLB,, | Performed by: INTERNAL MEDICINE

## 2019-12-10 PROCEDURE — 3079F DIAST BP 80-89 MM HG: CPT | Mod: HCNC,CPTII,S$GLB, | Performed by: INTERNAL MEDICINE

## 2019-12-10 NOTE — TELEPHONE ENCOUNTER
Spoke with Sara. She stated she needed  to sign and date Plan of care that they have been faxing not referral itself. Informed her I have not received it and gave a different fax number. Sara understood. All questions answered.

## 2019-12-10 NOTE — PROGRESS NOTES
Subjective:       Patient ID: David Amaro is a 74 y.o. male.    Chief Complaint: Follow-up; Hypertension; Hyperlipidemia; Chronic Kidney Disease; and Benign Prostatic Hypertrophy    Follow-up   Associated symptoms include arthralgias and myalgias. Pertinent negatives include no abdominal pain, chest pain, chills, coughing, diaphoresis, fatigue, fever, headaches, joint swelling, nausea, neck pain, numbness, rash, sore throat, vomiting or weakness.   Hypertension   Pertinent negatives include no chest pain, headaches, neck pain, palpitations or shortness of breath.   Hyperlipidemia   Associated symptoms include myalgias. Pertinent negatives include no chest pain or shortness of breath.   Benign Prostatic Hypertrophy   Irritative symptoms do not include frequency or urgency. Pertinent negatives include no chills, dysuria, hematuria, nausea or vomiting.     Past Medical History:   Diagnosis Date    Arthritis     Cancer     prostate    CKD (chronic kidney disease)     Dyslipidemia     HTN (hypertension)     Hyperlipidemia     Hypothyroidism     Prostate cancer     Radiation induced proctitis     Thyroid disease      Past Surgical History:   Procedure Laterality Date    COLONOSCOPY N/A 4/3/2017    Procedure: COLONOSCOPY;  Surgeon: Olivier Grayson MD;  Location: [facility];  Service: Endoscopy;  Laterality: N/A;    HERNIA REPAIR  2009 approx    seed implants in prostate      TONSILLECTOMY       Family History   Problem Relation Age of Onset    Stroke Mother     Cancer Mother     Colon cancer Mother     Diabetes Father     Cancer Sister         lung    Cancer Brother     Bone cancer Brother     Heart disease Maternal Grandmother      Social History     Socioeconomic History    Marital status:      Spouse name: Not on file    Number of children: Not on file    Years of education: Not on file    Highest education level: Not on file   Occupational History    Not on file   Social Needs     Financial resource strain: Not on file    Food insecurity:     Worry: Not on file     Inability: Not on file    Transportation needs:     Medical: Not on file     Non-medical: Not on file   Tobacco Use    Smoking status: Never Smoker    Smokeless tobacco: Never Used   Substance and Sexual Activity    Alcohol use: No    Drug use: No    Sexual activity: Yes     Partners: Female   Lifestyle    Physical activity:     Days per week: Not on file     Minutes per session: Not on file    Stress: Not on file   Relationships    Social connections:     Talks on phone: Not on file     Gets together: Not on file     Attends [employer]ian service: Not on file     Active member of club or organization: Not on file     Attends meetings of clubs or organizations: Not on file     Relationship status: Not on file   Other Topics Concern    Not on file   Social History Narrative    Not on file     Review of Systems   Constitutional: Negative for activity change, appetite change, chills, diaphoresis, fatigue, fever and unexpected weight change.   HENT: Negative for drooling, ear discharge, ear pain, facial swelling, hearing loss, mouth sores, nosebleeds, postnasal drip, rhinorrhea, sinus pressure, sneezing, sore throat, tinnitus, trouble swallowing and voice change.    Eyes: Negative for photophobia, redness and visual disturbance.   Respiratory: Negative for apnea, cough, choking, chest tightness, shortness of breath and wheezing.    Cardiovascular: Negative for chest pain, palpitations and leg swelling.   Gastrointestinal: Negative for abdominal distention, abdominal pain, anal bleeding, blood in stool, constipation, diarrhea, nausea and vomiting.   Endocrine: Negative for cold intolerance, heat intolerance, polydipsia, polyphagia and polyuria.   Genitourinary: Negative for difficulty urinating, dysuria, enuresis, flank pain, frequency, genital sores, hematuria and urgency.   Musculoskeletal: Positive for arthralgias and  myalgias. Negative for back pain, gait problem, joint swelling, neck pain and neck stiffness.   Skin: Negative for color change, pallor, rash and wound.   Allergic/Immunologic: Negative for food allergies and immunocompromised state.   Neurological: Negative for dizziness, tremors, seizures, syncope, facial asymmetry, speech difficulty, weakness, light-headedness, numbness and headaches.   Hematological: Negative for adenopathy. Does not bruise/bleed easily.   Psychiatric/Behavioral: Negative for agitation, behavioral problems, confusion, decreased concentration, dysphoric mood, hallucinations, self-injury, sleep disturbance and suicidal ideas. The patient is not nervous/anxious and is not hyperactive.        Objective:      Physical Exam   Constitutional: He is oriented to person, place, and time. He appears well-developed and well-nourished. No distress.   HENT:   Head: Normocephalic and atraumatic.   Eyes: Pupils are equal, round, and reactive to light.   Neck: Normal range of motion. Neck supple. No JVD present. Carotid bruit is not present. No tracheal deviation present. No thyromegaly present.   Cardiovascular: Normal rate, regular rhythm, normal heart sounds and intact distal pulses.   Pulmonary/Chest: Effort normal and breath sounds normal. No respiratory distress. He has no wheezes. He has no rales. He exhibits no tenderness.   Abdominal: Soft. Bowel sounds are normal. He exhibits no distension. There is no tenderness. There is no rebound and no guarding.   Musculoskeletal: Normal range of motion. He exhibits no edema or tenderness.   Lymphadenopathy:     He has no cervical adenopathy.   Neurological: He is alert and oriented to person, place, and time.   Skin: Skin is warm and dry. No rash noted. He is not diaphoretic. No erythema. No pallor.   Psychiatric: He has a normal mood and affect. His behavior is normal. Judgment and thought content normal.   Nursing note and vitals reviewed.      CMP  Sodium    Date Value Ref Range Status   11/12/2019 138 136 - 145 mmol/L Final     Potassium   Date Value Ref Range Status   11/12/2019 4.5 3.5 - 5.1 mmol/L Final     Chloride   Date Value Ref Range Status   11/12/2019 108 95 - 110 mmol/L Final     CO2   Date Value Ref Range Status   11/12/2019 25 23 - 29 mmol/L Final     Glucose   Date Value Ref Range Status   11/12/2019 83 70 - 110 mg/dL Final     BUN, Bld   Date Value Ref Range Status   11/12/2019 25 (H) 8 - 23 mg/dL Final     Creatinine   Date Value Ref Range Status   11/12/2019 1.5 (H) 0.5 - 1.4 mg/dL Final     Calcium   Date Value Ref Range Status   11/12/2019 9.5 8.7 - 10.5 mg/dL Final     Total Protein   Date Value Ref Range Status   11/12/2019 7.3 6.0 - 8.4 g/dL Final     Albumin   Date Value Ref Range Status   11/12/2019 4.1 3.5 - 5.2 g/dL Final     Total Bilirubin   Date Value Ref Range Status   11/12/2019 0.5 0.1 - 1.0 mg/dL Final     Comment:     For infants and newborns, interpretation of results should be based  on gestational age, weight and in agreement with clinical  observations.  Premature Infant recommended reference ranges:  Up to 24 hours.............<8.0 mg/dL  Up to 48 hours............<12.0 mg/dL  3-5 days..................<15.0 mg/dL  6-29 days.................<15.0 mg/dL       Alkaline Phosphatase   Date Value Ref Range Status   11/12/2019 79 55 - 135 U/L Final     AST   Date Value Ref Range Status   11/12/2019 19 10 - 40 U/L Final     ALT   Date Value Ref Range Status   11/12/2019 15 10 - 44 U/L Final     Anion Gap   Date Value Ref Range Status   11/12/2019 5 (L) 8 - 16 mmol/L Final     eGFR if    Date Value Ref Range Status   11/12/2019 52.6 (A) >60 mL/min/1.73 m^2 Final     eGFR if non    Date Value Ref Range Status   11/12/2019 45.5 (A) >60 mL/min/1.73 m^2 Final     Comment:     Calculation used to obtain the estimated glomerular filtration  rate (eGFR) is the CKD-EPI equation.        Lab Results   Component  Value Date    WBC 5.22 11/12/2019    HGB 13.0 (L) 11/12/2019    HCT 42.9 11/12/2019    MCV 97 11/12/2019     11/12/2019     Lab Results   Component Value Date    CHOL 196 05/15/2019     Lab Results   Component Value Date    HDL 77 (H) 05/15/2019     Lab Results   Component Value Date    LDLCALC 111.0 05/15/2019     Lab Results   Component Value Date    TRIG 40 05/15/2019     Lab Results   Component Value Date    CHOLHDL 39.3 05/15/2019     Lab Results   Component Value Date    TSH 2.982 05/15/2019     Lab Results   Component Value Date    HGBA1C 5.7 12/20/2005     Assessment:       1. Stage 3 chronic kidney disease    2. Essential hypertension    3. Dyslipidemia    4. Benign prostatic hyperplasia, unspecified whether lower urinary tract symptoms present        Plan:   Stage 3 chronic kidney disease    Essential hypertension    Dyslipidemia    Benign prostatic hyperplasia, unspecified whether lower urinary tract symptoms present    Other orders  -     Hypertension Digital Medicine (HDMP) Enrollment Order    Stable-------------------continue current meds, watch diet,exercise.                   Sees urology dr delvalle.              F/u 3 months----------

## 2019-12-10 NOTE — TELEPHONE ENCOUNTER
----- Message from Shefali Hurt sent at 12/10/2019 10:55 AM CST -----  Contact: [facility]    [facility] is calling in reference to fax order that are not correct. Nurse states that's he needs to speak to nurse in reference to correct information regarding pt treatment. #[phone] Fax# [phone] Sara     .Thank You  Shefali Hurt

## 2019-12-19 RX ORDER — LEVOTHYROXINE SODIUM 25 UG/1
TABLET ORAL
Qty: 90 TABLET | Refills: 3 | Status: SHIPPED | OUTPATIENT
Start: 2019-12-19 | End: 2020-12-18

## 2020-01-24 ENCOUNTER — OFFICE VISIT (OUTPATIENT)
Dept: NEPHROLOGY | Facility: CLINIC | Age: 75
End: 2020-01-24
Payer: MEDICARE

## 2020-01-24 VITALS
HEIGHT: 69 IN | WEIGHT: 173.31 LBS | BODY MASS INDEX: 25.67 KG/M2 | HEART RATE: 58 BPM | SYSTOLIC BLOOD PRESSURE: 142 MMHG | DIASTOLIC BLOOD PRESSURE: 80 MMHG

## 2020-01-24 DIAGNOSIS — N20.0 KIDNEY STONE ON LEFT SIDE: ICD-10-CM

## 2020-01-24 DIAGNOSIS — N25.81 HYPERPARATHYROIDISM, SECONDARY RENAL: ICD-10-CM

## 2020-01-24 DIAGNOSIS — N13.30 HYDRONEPHROSIS OF LEFT KIDNEY: ICD-10-CM

## 2020-01-24 DIAGNOSIS — I10 ESSENTIAL HYPERTENSION: Primary | ICD-10-CM

## 2020-01-24 DIAGNOSIS — N18.2 STAGE 2 CHRONIC KIDNEY DISEASE: ICD-10-CM

## 2020-01-24 DIAGNOSIS — N40.0 BENIGN PROSTATIC HYPERPLASIA, UNSPECIFIED WHETHER LOWER URINARY TRACT SYMPTOMS PRESENT: ICD-10-CM

## 2020-01-24 PROCEDURE — 3079F PR MOST RECENT DIASTOLIC BLOOD PRESSURE 80-89 MM HG: ICD-10-PCS | Mod: HCNC,CPTII,S$GLB, | Performed by: INTERNAL MEDICINE

## 2020-01-24 PROCEDURE — 76775 US EXAM ABDO BACK WALL LIM: CPT | Mod: HCNC,S$GLB,, | Performed by: INTERNAL MEDICINE

## 2020-01-24 PROCEDURE — 1126F PR PAIN SEVERITY QUANTIFIED, NO PAIN PRESENT: ICD-10-PCS | Mod: HCNC,S$GLB,, | Performed by: INTERNAL MEDICINE

## 2020-01-24 PROCEDURE — 1126F AMNT PAIN NOTED NONE PRSNT: CPT | Mod: HCNC,S$GLB,, | Performed by: INTERNAL MEDICINE

## 2020-01-24 PROCEDURE — 99499 UNLISTED E&M SERVICE: CPT | Mod: HCNC,S$GLB,, | Performed by: INTERNAL MEDICINE

## 2020-01-24 PROCEDURE — 99999 PR PBB SHADOW E&M-EST. PATIENT-LVL III: ICD-10-PCS | Mod: PBBFAC,HCNC,, | Performed by: INTERNAL MEDICINE

## 2020-01-24 PROCEDURE — 99214 PR OFFICE/OUTPT VISIT, EST, LEVL IV, 30-39 MIN: ICD-10-PCS | Mod: 25,HCNC,S$GLB, | Performed by: INTERNAL MEDICINE

## 2020-01-24 PROCEDURE — 99214 OFFICE O/P EST MOD 30 MIN: CPT | Mod: 25,HCNC,S$GLB, | Performed by: INTERNAL MEDICINE

## 2020-01-24 PROCEDURE — 1159F PR MEDICATION LIST DOCUMENTED IN MEDICAL RECORD: ICD-10-PCS | Mod: HCNC,S$GLB,, | Performed by: INTERNAL MEDICINE

## 2020-01-24 PROCEDURE — 99999 PR PBB SHADOW E&M-EST. PATIENT-LVL III: CPT | Mod: PBBFAC,HCNC,, | Performed by: INTERNAL MEDICINE

## 2020-01-24 PROCEDURE — 76775 PR  US, RETROPERITNL ABD,  LTD: ICD-10-PCS | Mod: HCNC,S$GLB,, | Performed by: INTERNAL MEDICINE

## 2020-01-24 PROCEDURE — 3079F DIAST BP 80-89 MM HG: CPT | Mod: HCNC,CPTII,S$GLB, | Performed by: INTERNAL MEDICINE

## 2020-01-24 PROCEDURE — 1101F PT FALLS ASSESS-DOCD LE1/YR: CPT | Mod: HCNC,CPTII,S$GLB, | Performed by: INTERNAL MEDICINE

## 2020-01-24 PROCEDURE — 99499 RISK ADDL DX/OHS AUDIT: ICD-10-PCS | Mod: HCNC,S$GLB,, | Performed by: INTERNAL MEDICINE

## 2020-01-24 PROCEDURE — 1159F MED LIST DOCD IN RCRD: CPT | Mod: HCNC,S$GLB,, | Performed by: INTERNAL MEDICINE

## 2020-01-24 PROCEDURE — 3077F SYST BP >= 140 MM HG: CPT | Mod: HCNC,CPTII,S$GLB, | Performed by: INTERNAL MEDICINE

## 2020-01-24 PROCEDURE — 1101F PR PT FALLS ASSESS DOC 0-1 FALLS W/OUT INJ PAST YR: ICD-10-PCS | Mod: HCNC,CPTII,S$GLB, | Performed by: INTERNAL MEDICINE

## 2020-01-24 PROCEDURE — 3077F PR MOST RECENT SYSTOLIC BLOOD PRESSURE >= 140 MM HG: ICD-10-PCS | Mod: HCNC,CPTII,S$GLB, | Performed by: INTERNAL MEDICINE

## 2020-01-24 RX ORDER — ERGOCALCIFEROL 1.25 MG/1
50000 CAPSULE ORAL
Qty: 12 CAPSULE | Refills: 5 | Status: SHIPPED | OUTPATIENT
Start: 2020-01-24 | End: 2020-04-23

## 2020-01-24 NOTE — PROGRESS NOTES
Subjective:       Patient ID: David Amaro is a 74 y.o. Black or  male who presents for new evaluation of Chronic Kidney Disease    Hypertension   Pertinent negatives include no chest pain, headaches, neck pain, palpitations or shortness of breath.        Patient is a 73-year-old male with history of rising creatinine recently from a baseline of 1.1 up to 1.8.  Patient has had left elbow arthritis status post rheumatology evaluation.  Patient had been taking Advil twice a day for inflammation of the left elbow.  He used Advil for about a month.  He also has been taking Zestril 2.5 mg for blood pressure control.  Patient has been working in the [employer] and has had episodes of dehydration with hypotension.    May 2018 seen for acute kidney injury:  Stop all nonsteroidals.  Oral hydration.  Stop Zestril.    Patient had emergency room with it in bed and [facility] on 5/25/2018 patient had presented with abdominal pain nausea vomiting abdominal pain and had a CT scan of the abdomen showing Right inguinal hernia containing mostly fat, and a small portion of the cecal tip. Diverticulosis coli. Right kidney: No calculi. No hydronephrosis. Left kidney: Left-sided hydronephrosis and hydroureter, secondary to a 2 mm calculus in the proximal third of the left ureter, axial image 65, coronal image 45. Left renal edema and perinephric fat stranding. No intrarenal calculi. 1 cm cortical cyst present in the posterior lower pole region. Urinary bladder appears unremarkable. Radiation seed implants in the prostate bed region.       June 2018 patient has improved on his creatinine with the creatinine down to 1.5.  No heavy proteinuria. Pending follow up with dr. Da Velez for stone and Prostate ca. Has passed the stone.  Added Cardura for blood pressure control which needed prior authorization    August 2018 comes back for follow-up kidney function stable.  Vitamin-D deficiency.  Weekly ergocalciferol  "started    January 2019 creatinine is stable.  GFR 65% chronic kidney disease stage 2 stable overall.    Review of Systems   Constitutional: Negative.  Negative for activity change, appetite change, chills, diaphoresis, fatigue and fever.   HENT: Negative.  Negative for congestion and trouble swallowing.    Eyes: Negative.    Respiratory: Negative.  Negative for cough, chest tightness, shortness of breath and wheezing.    Cardiovascular: Negative.  Negative for chest pain, palpitations and leg swelling.   Gastrointestinal: Negative.  Negative for abdominal distention, abdominal pain, nausea and vomiting.   Genitourinary: Negative.  Negative for decreased urine volume, difficulty urinating, dysuria, enuresis, flank pain, frequency, hematuria, penile swelling, scrotal swelling and urgency.   Musculoskeletal: Negative.  Negative for arthralgias, back pain, joint swelling and neck pain.   Skin: Negative for rash.   Neurological: Negative.  Negative for tremors, seizures and headaches.   Psychiatric/Behavioral: Negative.  Negative for confusion and sleep disturbance. The patient is not nervous/anxious.    All other systems reviewed and are negative.      Objective:   BP (!) 142/80   Pulse (!) 58   Ht 5' 9" (1.753 m)   Wt 78.6 kg (173 lb 4.5 oz)   BMI 25.59 kg/m²      Physical Exam   Constitutional: He is oriented to person, place, and time. He appears well-developed and well-nourished. No distress.   HENT:   Head: Normocephalic.   Eyes: Pupils are equal, round, and reactive to light. EOM are normal.   Neck: Normal range of motion. Neck supple. No JVD present. No thyromegaly present.   Cardiovascular: Normal rate, regular rhythm, S1 normal, S2 normal, normal heart sounds and intact distal pulses. PMI is not displaced. Exam reveals no gallop and no friction rub.   No murmur heard.  Pulmonary/Chest: Effort normal and breath sounds normal. No respiratory distress. He has no wheezes. He has no rales. He exhibits no " tenderness.   Abdominal: He exhibits no distension and no mass. There is no hepatosplenomegaly. There is no tenderness. There is no rebound and no CVA tenderness. No hernia.   Musculoskeletal: Normal range of motion. He exhibits no edema or tenderness.   Lymphadenopathy:     He has no cervical adenopathy.   Neurological: He is alert and oriented to person, place, and time. He has normal reflexes. He is not disoriented. He displays normal reflexes. No cranial nerve deficit. He exhibits normal muscle tone. Coordination normal.   Skin: Skin is warm and dry. No rash noted. No erythema.   Psychiatric: He has a normal mood and affect. His behavior is normal. Judgment and thought content normal.       The bilateral kidneys are normal in size, echotexture, and cortical thickness. No evidence of hydronephrosis, solid mass, or calculus. No perinephric fluid collections.    The right kidney measures 10.8  cm in length.    The left kidney measures small calyx dilatation unchanged from 6/2018     The urinary bladder is within normal limits.    Impression: Normal renal ultrasound on right without evidence of hydronephrosis.  Left-sided echogenic kidney unchanged    Lab Results   Component Value Date    CREATININE 1.5 (H) 11/12/2019    BUN 25 (H) 11/12/2019     11/12/2019    K 4.5 11/12/2019     11/12/2019    CO2 25 11/12/2019     Lab Results   Component Value Date    WBC 5.22 11/12/2019    HGB 13.0 (L) 11/12/2019    HCT 42.9 11/12/2019    MCV 97 11/12/2019     11/12/2019     Lab Results   Component Value Date    .0 (H) 01/15/2019    CALCIUM 9.5 11/12/2019    PHOS 2.9 01/15/2019           Right kidney         Left kidney no change   Assessment:    )    1. Essential hypertension    2. Benign prostatic hyperplasia, unspecified whether lower urinary tract symptoms present    3. Hydronephrosis of left kidney    4. Hyperparathyroidism, secondary renal    5. Kidney stone on left side    6. Stage 2 chronic  kidney disease        Plan:           1.  Chronic kidney disease stage 3 4/stage II:  Likely combination of left-sided hydronephrosis and analgesic nephropathy.  GFR based on Cystatin C with a corresponding creatinine of 1.3 is equal to 65 %.    POCUS show no change see images     3.  Essential hypertension:  Stable on Cardura at bedtime.    4.  BPH on Flomax    5. Dr. Velez to see for prostate issues     6.  2HPT :Vitamin-D deficiency  : refill ergocalciferol weekly       Follow up 1 year

## 2020-01-29 ENCOUNTER — TELEPHONE (OUTPATIENT)
Dept: NEPHROLOGY | Facility: CLINIC | Age: 75
End: 2020-01-29

## 2020-01-29 ENCOUNTER — TELEPHONE (OUTPATIENT)
Dept: FAMILY MEDICINE | Facility: CLINIC | Age: 75
End: 2020-01-29

## 2020-01-29 NOTE — TELEPHONE ENCOUNTER
Pt states cardura and flomax is not covered by insurance because their is an interaction of medicine     Please advise pt

## 2020-01-29 NOTE — TELEPHONE ENCOUNTER
----- Message from Vannesa Mg sent at 1/29/2020  3:45 PM CST -----  Contact: patient   Requesting a call back regarding possible interaction of bp medication.Please call back at [phone].      Thanks,  Vannesa Mg

## 2020-01-29 NOTE — TELEPHONE ENCOUNTER
Called to let them know dr lares ordered the two meds and hes ok with pt taking them both. 1/29/2020/1656/sf

## 2020-01-30 ENCOUNTER — TELEPHONE (OUTPATIENT)
Dept: FAMILY MEDICINE | Facility: CLINIC | Age: 75
End: 2020-01-30

## 2020-01-30 NOTE — TELEPHONE ENCOUNTER
----- Message from Garth Marquez sent at 1/30/2020  3:45 PM CST -----  Contact: Pt  Please give pt a call at [phone] regarding his medication

## 2020-01-30 NOTE — TELEPHONE ENCOUNTER
Pt states he will be stopping the flomax   And get half of cardura script  Insurance will not pay for both meds

## 2020-02-07 ENCOUNTER — TELEPHONE (OUTPATIENT)
Dept: NEPHROLOGY | Facility: CLINIC | Age: 75
End: 2020-02-07

## 2020-02-07 NOTE — TELEPHONE ENCOUNTER
----- Message from Zacarias Ramirez sent at 2/7/2020 12:46 PM CST -----  Contact: [facility]   Type:  Pharmacy Calling to Clarify an RX    Name of Caller: marlene  [facility]   Prescription Name:doxyzosin metholate 4 mg tablets  What do they need to clarify?: Prior auth on it   Best Call Back Number: [phone]  Additional Information:

## 2020-02-14 ENCOUNTER — OFFICE VISIT (OUTPATIENT)
Dept: PAIN MEDICINE | Facility: CLINIC | Age: 75
End: 2020-02-14
Payer: MEDICARE

## 2020-02-14 VITALS
BODY MASS INDEX: 24.98 KG/M2 | HEART RATE: 91 BPM | DIASTOLIC BLOOD PRESSURE: 90 MMHG | HEIGHT: 69 IN | WEIGHT: 168.63 LBS | SYSTOLIC BLOOD PRESSURE: 151 MMHG

## 2020-02-14 DIAGNOSIS — G89.29 CHRONIC HIP PAIN, UNSPECIFIED LATERALITY: Primary | ICD-10-CM

## 2020-02-14 DIAGNOSIS — M25.559 CHRONIC HIP PAIN, UNSPECIFIED LATERALITY: Primary | ICD-10-CM

## 2020-02-14 DIAGNOSIS — M16.11 PRIMARY OSTEOARTHRITIS OF RIGHT HIP: ICD-10-CM

## 2020-02-14 PROCEDURE — 1159F MED LIST DOCD IN RCRD: CPT | Mod: HCNC,S$GLB,, | Performed by: PHYSICAL MEDICINE & REHABILITATION

## 2020-02-14 PROCEDURE — 1101F PT FALLS ASSESS-DOCD LE1/YR: CPT | Mod: HCNC,CPTII,S$GLB, | Performed by: PHYSICAL MEDICINE & REHABILITATION

## 2020-02-14 PROCEDURE — 3080F PR MOST RECENT DIASTOLIC BLOOD PRESSURE >= 90 MM HG: ICD-10-PCS | Mod: HCNC,CPTII,S$GLB, | Performed by: PHYSICAL MEDICINE & REHABILITATION

## 2020-02-14 PROCEDURE — 99213 PR OFFICE/OUTPT VISIT, EST, LEVL III, 20-29 MIN: ICD-10-PCS | Mod: HCNC,S$GLB,, | Performed by: PHYSICAL MEDICINE & REHABILITATION

## 2020-02-14 PROCEDURE — 99213 OFFICE O/P EST LOW 20 MIN: CPT | Mod: HCNC,S$GLB,, | Performed by: PHYSICAL MEDICINE & REHABILITATION

## 2020-02-14 PROCEDURE — 3077F PR MOST RECENT SYSTOLIC BLOOD PRESSURE >= 140 MM HG: ICD-10-PCS | Mod: HCNC,CPTII,S$GLB, | Performed by: PHYSICAL MEDICINE & REHABILITATION

## 2020-02-14 PROCEDURE — 3080F DIAST BP >= 90 MM HG: CPT | Mod: HCNC,CPTII,S$GLB, | Performed by: PHYSICAL MEDICINE & REHABILITATION

## 2020-02-14 PROCEDURE — 1125F AMNT PAIN NOTED PAIN PRSNT: CPT | Mod: HCNC,S$GLB,, | Performed by: PHYSICAL MEDICINE & REHABILITATION

## 2020-02-14 PROCEDURE — 1125F PR PAIN SEVERITY QUANTIFIED, PAIN PRESENT: ICD-10-PCS | Mod: HCNC,S$GLB,, | Performed by: PHYSICAL MEDICINE & REHABILITATION

## 2020-02-14 PROCEDURE — 3077F SYST BP >= 140 MM HG: CPT | Mod: HCNC,CPTII,S$GLB, | Performed by: PHYSICAL MEDICINE & REHABILITATION

## 2020-02-14 PROCEDURE — 1101F PR PT FALLS ASSESS DOC 0-1 FALLS W/OUT INJ PAST YR: ICD-10-PCS | Mod: HCNC,CPTII,S$GLB, | Performed by: PHYSICAL MEDICINE & REHABILITATION

## 2020-02-14 PROCEDURE — 99999 PR PBB SHADOW E&M-EST. PATIENT-LVL III: ICD-10-PCS | Mod: PBBFAC,HCNC,, | Performed by: PHYSICAL MEDICINE & REHABILITATION

## 2020-02-14 PROCEDURE — 99999 PR PBB SHADOW E&M-EST. PATIENT-LVL III: CPT | Mod: PBBFAC,HCNC,, | Performed by: PHYSICAL MEDICINE & REHABILITATION

## 2020-02-14 PROCEDURE — 1159F PR MEDICATION LIST DOCUMENTED IN MEDICAL RECORD: ICD-10-PCS | Mod: HCNC,S$GLB,, | Performed by: PHYSICAL MEDICINE & REHABILITATION

## 2020-02-14 NOTE — PROGRESS NOTES
Established Patient Chronic Pain Note (Follow up visit)    Chief Complaint:   Chief Complaint   Patient presents with    Hip Pain       SUBJECTIVE:    David Amaro is a 74 y.o. male who presents to the clinic for a follow-up appointment for right hip pain. At the last clinic visit, he was referred to physical therapy which he has since completed.  He reports that he has had improvement.  He was provided home exercise regimen, which he reports he has been mildly compliant with.  Since the last visit, David Amaro states the pain has been improving. Current pain intensity is 1/10.    Patient denies night fever/night sweats, urinary incontinence, bowel incontinence, significant weight loss, significant motor weakness and loss of sensations.    Initial HPI 11/15/2019:  David Amaro is a 74 y.o. male who presents to the clinic for the evaluation of right hip pain. He was referred by his primary care physician for evaluation management of the right hip pain. Of note patient does have a history of prostate cancer.  The pain started about one month ago without any injury or trauma and symptoms have been worsening. He also reports having bilateral shin pain that he attributes to performing stair exercises. The pain is located in the right hip area without radiation. He has separate shin pain bilaterally.  The pain is described as aching and sharp and is rated as 6/10. The pain is rated with a score of  0/10 on the BEST day and a score of 8/10 on the WORST day.  Symptoms interfere with daily activity. The pain is exacerbated by sitting down, getting up from sitting, and use of stairs.  The pain is mitigated by elevating his leg, standing up, and walking. The patient reports spending less than 2 hours per day reclining. The patient reports 6-8 hours of uninterrupted sleep per night.    Non-Pharmacologic Treatments:  Physical Therapy/Home Exercise:  yes, recently completed  Ice/Heat:no  TENS: no  Acupuncture:  no  Massage: no  Chiropractic: no    Other: no        Pain Medications:  Patient reports that he has not been taking anything for his pain    Opioid Contract: no     report:  Not applicable    Pain Procedures:   Denies    Imaging:   XR Lumbar Spine 6/3/13:  As compared with 6/17/08, no significant interim change of the lumbar   spine is identified.  There has been interim placement of multiple   brachytherapy implants within the prostate.     X-ray right hip 11/15/2019:  Degenerative change noted in the included lumbosacral spine and bilateral SI joints.  Asymmetric degenerative change noted involving the hip joints, greater along the medial margin right hip.  Marginal spurring noted without definite evidence of AVN on this exam.  Pelvic ring is intact.  Radiation seeds project over the midline in area of the prostate.  No acute fracture or dislocation identified.      PMHx,PSHx, Social history, and Family history:  I have reviewed the patient's medical, surgical, social, and family history in detail and updated the computerized patient record.      Review of patient's allergies indicates:   Allergen Reactions    No known drug allergies        Current Outpatient Medications   Medication Sig    doxazosin (CARDURA) 4 MG tablet Take 1 tablet (4 mg total) by mouth every evening.    ergocalciferol (ERGOCALCIFEROL) 50,000 unit Cap Take 1 capsule (50,000 Units total) by mouth every 7 days.    levothyroxine (SYNTHROID) 25 MCG tablet TAKE 1 TABLET(25 MCG) BY MOUTH EVERY DAY    tamsulosin (FLOMAX) 0.4 mg Cp24 Take 0.4 mg by mouth once daily.     No current facility-administered medications for this visit.          REVIEW OF SYSTEMS:    GENERAL:  No weight loss, malaise or fevers.  HEENT:   No recent changes in vision or hearing  NECK:  Negative for lumps, no difficulty with swallowing.  RESPIRATORY:  Negative for cough, wheezing or shortness of breath, patient denies any recent URI.  CARDIOVASCULAR:  Negative for  "chest pain, leg swelling or palpitations.  GI:  Negative for abdominal discomfort, blood in stools or black stools or change in bowel habits.  MUSCULOSKELETAL:  See HPI.  SKIN:  Negative for lesions, rash, and itching.  PSYCH:  No mood disorder or recent psychosocial stressors.  Patients sleep is not disturbed secondary to pain.  HEMATOLOGY/LYMPHOLOGY:  Negative for prolonged bleeding, bruising easily or swollen nodes.  Patient is not currently taking any anti-coagulants  NEURO:   No history of headaches, syncope, paralysis, seizures or tremors.  All other reviewed and negative other than HPI.    OBJECTIVE:    BP (!) 151/90   Pulse 91   Ht 5' 9" (1.753 m)   Wt 76.5 kg (168 lb 10.4 oz)   BMI 24.91 kg/m²     PHYSICAL EXAMINATION:    GENERAL: Well appearing, in no acute distress, alert and oriented x3.  PSYCH:  Mood and affect appropriate.  SKIN: Skin color, texture, turgor normal, no rashes or lesions.  HEAD/FACE:  Normocephalic, atraumatic. Cranial nerves grossly intact.  NECK: No pain to palpation over the cervical paraspinous muscles. Spurling Negative. No pain with neck flexion, extension, or lateral flexion.   CV: RRR with palpation of the radial artery.  PULM: No evidence of respiratory difficulty, symmetric chest rise.  GI:  Soft and non-tender.  BACK: Straight leg raising in the sitting and supine positions is negative to radicular pain. No pain to palpation over the facet joints of the lumbar spine or spinous processes. Normal range of motion without pain reproduction.  EXTREMITIES: Peripheral joint ROM is full and pain free without obvious instability or laxity in all four extremities with the exception of limited internal rotation of the right hip when compared to the left that also reproduced a minimal amount of groin pain and some buttock pain. No deformities, edema, or skin discoloration. Good capillary refill.  MUSCULOSKELETAL: Shoulder, hip, and knee provocative maneuvers are negative.  There is no " pain with palpation over the sacroiliac joints bilaterally.  FABERs test is negative.  FADIRs test is negative.   Bilateral upper and lower extremity strength is normal and symmetric.  No atrophy or tone abnormalities are noted.  NEURO: Bilateral upper and lower extremity coordination and muscle stretch reflexes are physiologic and symmetric.  Plantar response are downgoing. No clonus.  No loss of sensation is noted.  GAIT: normal.      LABS:  Lab Results   Component Value Date    WBC 5.22 11/12/2019    HGB 13.0 (L) 11/12/2019    HCT 42.9 11/12/2019    MCV 97 11/12/2019     11/12/2019       CMP  Sodium   Date Value Ref Range Status   11/12/2019 138 136 - 145 mmol/L Final     Potassium   Date Value Ref Range Status   11/12/2019 4.5 3.5 - 5.1 mmol/L Final     Chloride   Date Value Ref Range Status   11/12/2019 108 95 - 110 mmol/L Final     CO2   Date Value Ref Range Status   11/12/2019 25 23 - 29 mmol/L Final     Glucose   Date Value Ref Range Status   11/12/2019 83 70 - 110 mg/dL Final     BUN, Bld   Date Value Ref Range Status   11/12/2019 25 (H) 8 - 23 mg/dL Final     Creatinine   Date Value Ref Range Status   11/12/2019 1.5 (H) 0.5 - 1.4 mg/dL Final     Calcium   Date Value Ref Range Status   11/12/2019 9.5 8.7 - 10.5 mg/dL Final     Total Protein   Date Value Ref Range Status   11/12/2019 7.3 6.0 - 8.4 g/dL Final     Albumin   Date Value Ref Range Status   11/12/2019 4.1 3.5 - 5.2 g/dL Final     Total Bilirubin   Date Value Ref Range Status   11/12/2019 0.5 0.1 - 1.0 mg/dL Final     Comment:     For infants and newborns, interpretation of results should be based  on gestational age, weight and in agreement with clinical  observations.  Premature Infant recommended reference ranges:  Up to 24 hours.............<8.0 mg/dL  Up to 48 hours............<12.0 mg/dL  3-5 days..................<15.0 mg/dL  6-29 days.................<15.0 mg/dL       Alkaline Phosphatase   Date Value Ref Range Status   11/12/2019 79  55 - 135 U/L Final     AST   Date Value Ref Range Status   11/12/2019 19 10 - 40 U/L Final     ALT   Date Value Ref Range Status   11/12/2019 15 10 - 44 U/L Final     Anion Gap   Date Value Ref Range Status   11/12/2019 5 (L) 8 - 16 mmol/L Final     eGFR if    Date Value Ref Range Status   11/12/2019 52.6 (A) >60 mL/min/1.73 m^2 Final     eGFR if non    Date Value Ref Range Status   11/12/2019 45.5 (A) >60 mL/min/1.73 m^2 Final     Comment:     Calculation used to obtain the estimated glomerular filtration  rate (eGFR) is the CKD-EPI equation.          Lab Results   Component Value Date    HGBA1C 5.7 12/20/2005             ASSESSMENT: 74 y.o. year old male with right hip pain, consistent with     1. Chronic hip pain, unspecified laterality     2. Primary osteoarthritis of right hip           PLAN:   - Interventions: None at this time, may consider right hip joint injection in the future if warranted..  - Anticoagulation: no  - Medications: I have stressed the importance of physical activity and a home exercise plan to help with pain and improve health. and Patient can continue with medications for now since they are providing benefits, using them appropriately, and without side effects.  - Therapy:  Expressed the importance of continuing his home exercises at least 1-2 times weekly to help address his chronic pain issues involving the right hip secondary to primary osteoarthritis  - Labs:  Reviewed  - Imaging:  Reviewed  - Consults/Referrals:  None at this time  - Records:  Reviewed/Obtain old records from outside physicians and imaging  - Follow up visit: return to clinic on an as-needed basis  - Counseled patient regarding the importance of activity modification and physical therapy  - This condition does not require this patient to take time off of work, and the primary goal of our Pain Management services is to improve the patient's functional capacity.  - Patient Questions:  Answered all of the patient's questions regarding diagnosis, therapy, and treatment    The above plan and management options were discussed at length with patient. Patient is in agreement with the above and verbalized understanding.      Richy Marie MD  Interventional Pain Management  [facility]    Disclaimer:  This note was prepared using voice recognition system and is likely to have sound alike errors that may have been overlooked even after proof reading.  Please call me with any questions

## 2020-02-14 NOTE — PATIENT INSTRUCTIONS
- Continue current medications as prescribed.  - Continue home based exercises and discussed the importance of a healthy and active lifestyle  - Return for follow up on an as needed basis.    Please do not hesitate to contact the clinic [phone] if you have any questions regarding your treatment plan.     Richy Marie MD  Interventional Pain Medicine  [facility] [place]

## 2020-02-17 ENCOUNTER — OFFICE VISIT (OUTPATIENT)
Dept: URGENT CARE | Facility: CLINIC | Age: 75
End: 2020-02-17
Payer: MEDICARE

## 2020-02-17 VITALS
TEMPERATURE: 97 F | OXYGEN SATURATION: 98 % | RESPIRATION RATE: 18 BRPM | BODY MASS INDEX: 24.88 KG/M2 | WEIGHT: 168 LBS | DIASTOLIC BLOOD PRESSURE: 78 MMHG | HEIGHT: 69 IN | HEART RATE: 74 BPM | SYSTOLIC BLOOD PRESSURE: 142 MMHG

## 2020-02-17 DIAGNOSIS — R09.81 NASAL CONGESTION: ICD-10-CM

## 2020-02-17 DIAGNOSIS — J06.9 UPPER RESPIRATORY TRACT INFECTION, UNSPECIFIED TYPE: Primary | ICD-10-CM

## 2020-02-17 PROCEDURE — 99213 PR OFFICE/OUTPT VISIT, EST, LEVL III, 20-29 MIN: ICD-10-PCS | Mod: S$GLB,,, | Performed by: PHYSICIAN ASSISTANT

## 2020-02-17 PROCEDURE — 99213 OFFICE O/P EST LOW 20 MIN: CPT | Mod: S$GLB,,, | Performed by: PHYSICIAN ASSISTANT

## 2020-02-17 RX ORDER — FLUTICASONE PROPIONATE 50 MCG
SPRAY, SUSPENSION (ML) NASAL
Qty: 48 G | Refills: 0 | Status: SHIPPED | OUTPATIENT
Start: 2020-02-17 | End: 2021-04-14

## 2020-02-17 RX ORDER — FLUTICASONE PROPIONATE 50 MCG
1 SPRAY, SUSPENSION (ML) NASAL DAILY
Qty: 16 G | Refills: 0 | Status: SHIPPED | OUTPATIENT
Start: 2020-02-17 | End: 2020-02-17

## 2020-02-17 NOTE — PROGRESS NOTES
"Subjective:       Patient ID: David Amaro is a 74 y.o. male.    Vitals:  height is 5' 9" (1.753 m) and weight is 76.2 kg (168 lb). His temperature is 97 °F (36.1 °C). His blood pressure is 142/78 (abnormal) and his pulse is 74. His respiration is 18 and oxygen saturation is 98%.     Chief Complaint: URI    Pt presents to clinic with cough and nasal congestion since Friday afternoon.    URI    This is a new problem. The current episode started in the past 7 days. The problem has been unchanged. There has been no fever. Associated symptoms include congestion and coughing. Pertinent negatives include no chest pain, diarrhea, dysuria, headaches, nausea, rash, sore throat or vomiting.       Constitution: Negative for chills, fatigue and fever.   HENT: Positive for congestion and voice change. Negative for sore throat.    Neck: Negative for painful lymph nodes.   Cardiovascular: Negative for chest pain and leg swelling.   Eyes: Negative for double vision and blurred vision.   Respiratory: Positive for cough. Negative for shortness of breath.    Gastrointestinal: Negative for nausea, vomiting and diarrhea.   Genitourinary: Negative for dysuria, frequency and urgency.   Musculoskeletal: Negative for joint pain, joint swelling, muscle cramps and muscle ache.   Skin: Negative for color change, pale and rash.   Allergic/Immunologic: Negative for seasonal allergies.   Neurological: Negative for dizziness, history of vertigo, light-headedness, passing out and headaches.   Hematologic/Lymphatic: Negative for swollen lymph nodes, easy bruising/bleeding and history of blood clots. Does not bruise/bleed easily.   Psychiatric/Behavioral: Negative for nervous/anxious, sleep disturbance and depression. The patient is not nervous/anxious.        Objective:      Physical Exam   Constitutional: He is oriented to person, place, and time. He appears well-developed and well-nourished. He is cooperative.  Non-toxic appearance. He does " not have a sickly appearance. He does not appear ill. No distress.   HENT:   Head: Normocephalic and atraumatic.   Right Ear: Hearing, external ear and ear canal normal. Right ear erythematous TM: dull tm.   Left Ear: Hearing, external ear and ear canal normal. Left ear erythematous TM: dull tm    Nose: Nose normal. No mucosal edema, rhinorrhea or nasal deformity. No epistaxis. Right sinus exhibits no maxillary sinus tenderness and no frontal sinus tenderness. Left sinus exhibits no maxillary sinus tenderness and no frontal sinus tenderness.   Mouth/Throat: Uvula is midline, oropharynx is clear and moist and mucous membranes are normal. No trismus in the jaw. Normal dentition. No uvula swelling. No oropharyngeal exudate, posterior oropharyngeal edema or posterior oropharyngeal erythema (hyperemia ).   Eyes: Conjunctivae and lids are normal. No scleral icterus.   Neck: Trachea normal, full passive range of motion without pain and phonation normal. Neck supple. No neck rigidity. No edema and no erythema present.   Cardiovascular: Normal rate, regular rhythm, normal heart sounds, intact distal pulses and normal pulses.   Pulmonary/Chest: Effort normal and breath sounds normal. No respiratory distress. He has no decreased breath sounds. He has no rhonchi.   Abdominal: Normal appearance.   Musculoskeletal: Normal range of motion. He exhibits no edema or deformity.   Neurological: He is alert and oriented to person, place, and time. He exhibits normal muscle tone. Coordination normal.   Skin: Skin is warm, dry, intact, not diaphoretic and not pale.   Psychiatric: He has a normal mood and affect. His speech is normal and behavior is normal. Judgment and thought content normal. Cognition and memory are normal.   Nursing note and vitals reviewed.        Assessment:       1. Upper respiratory tract infection, unspecified type    2. Nasal congestion        Plan:         Upper respiratory tract infection, unspecified  type    Nasal congestion  -     Discontinue: fluticasone propionate (FLONASE) 50 mcg/actuation nasal spray; 1 spray (50 mcg total) by Each Nostril route once daily.  Dispense: 16 g; Refill: 0         URI   Discussed likely viral etiology - recommended supportive care   flonase to decrease congestion   Increase fluids   Rest   Antihistamine if mucous is thin for pnd (hold if mucous is thick - risk drying thick mucous in nasal passages)   Zicam Zinc Rapid Melts       For Cough    -  Lozenges during the day    -  Increase fluids    -  Delsym for night time cough    -  Humidifier/prop up at night     Reba Giron PA-C   Physician Assistant   [facility]

## 2020-02-17 NOTE — PATIENT INSTRUCTIONS
flonase to decrease congestion   Increase fluids   Rest   Antihistamine if mucous is thin for pnd (hold if mucous is thick - risk drying thick mucous in nasal passages)   Zicam Zinc Rapid Melts       For Cough    -  Lozenges during the day    -  Increase fluids    -  Delsym for night time cough    -  Humidifier/prop up at night

## 2020-02-20 ENCOUNTER — TELEPHONE (OUTPATIENT)
Dept: URGENT CARE | Facility: CLINIC | Age: 75
End: 2020-02-20

## 2020-02-24 ENCOUNTER — OFFICE VISIT (OUTPATIENT)
Dept: FAMILY MEDICINE | Facility: CLINIC | Age: 75
End: 2020-02-24
Payer: MEDICARE

## 2020-02-24 ENCOUNTER — HOSPITAL ENCOUNTER (OUTPATIENT)
Dept: RADIOLOGY | Facility: HOSPITAL | Age: 75
Discharge: HOME | End: 2020-02-24
Attending: INTERNAL MEDICINE
Payer: MEDICARE

## 2020-02-24 VITALS
SYSTOLIC BLOOD PRESSURE: 118 MMHG | OXYGEN SATURATION: 97 % | BODY MASS INDEX: 24.03 KG/M2 | TEMPERATURE: 100 F | WEIGHT: 162.69 LBS | RESPIRATION RATE: 16 BRPM | HEART RATE: 99 BPM | DIASTOLIC BLOOD PRESSURE: 68 MMHG

## 2020-02-24 DIAGNOSIS — M25.562 ACUTE PAIN OF LEFT KNEE: ICD-10-CM

## 2020-02-24 DIAGNOSIS — M54.32 SCIATICA OF LEFT SIDE: Primary | ICD-10-CM

## 2020-02-24 PROCEDURE — 96372 THER/PROPH/DIAG INJ SC/IM: CPT | Mod: HCNC,S$GLB,, | Performed by: INTERNAL MEDICINE

## 2020-02-24 PROCEDURE — 99999 PR PBB SHADOW E&M-EST. PATIENT-LVL IV: CPT | Mod: PBBFAC,HCNC,, | Performed by: INTERNAL MEDICINE

## 2020-02-24 PROCEDURE — 3074F PR MOST RECENT SYSTOLIC BLOOD PRESSURE < 130 MM HG: ICD-10-PCS | Mod: HCNC,CPTII,S$GLB, | Performed by: INTERNAL MEDICINE

## 2020-02-24 PROCEDURE — 1125F AMNT PAIN NOTED PAIN PRSNT: CPT | Mod: HCNC,S$GLB,, | Performed by: INTERNAL MEDICINE

## 2020-02-24 PROCEDURE — 3078F DIAST BP <80 MM HG: CPT | Mod: HCNC,CPTII,S$GLB, | Performed by: INTERNAL MEDICINE

## 2020-02-24 PROCEDURE — 1125F PR PAIN SEVERITY QUANTIFIED, PAIN PRESENT: ICD-10-PCS | Mod: HCNC,S$GLB,, | Performed by: INTERNAL MEDICINE

## 2020-02-24 PROCEDURE — 1159F PR MEDICATION LIST DOCUMENTED IN MEDICAL RECORD: ICD-10-PCS | Mod: HCNC,S$GLB,, | Performed by: INTERNAL MEDICINE

## 2020-02-24 PROCEDURE — 3078F PR MOST RECENT DIASTOLIC BLOOD PRESSURE < 80 MM HG: ICD-10-PCS | Mod: HCNC,CPTII,S$GLB, | Performed by: INTERNAL MEDICINE

## 2020-02-24 PROCEDURE — 1101F PR PT FALLS ASSESS DOC 0-1 FALLS W/OUT INJ PAST YR: ICD-10-PCS | Mod: HCNC,CPTII,S$GLB, | Performed by: INTERNAL MEDICINE

## 2020-02-24 PROCEDURE — 99999 PR PBB SHADOW E&M-EST. PATIENT-LVL IV: ICD-10-PCS | Mod: PBBFAC,HCNC,, | Performed by: INTERNAL MEDICINE

## 2020-02-24 PROCEDURE — 96372 PR INJECTION,THERAP/PROPH/DIAG2ST, IM OR SUBCUT: ICD-10-PCS | Mod: HCNC,S$GLB,, | Performed by: INTERNAL MEDICINE

## 2020-02-24 PROCEDURE — 99213 OFFICE O/P EST LOW 20 MIN: CPT | Mod: HCNC,25,S$GLB, | Performed by: INTERNAL MEDICINE

## 2020-02-24 PROCEDURE — 73560 X-RAY EXAM OF KNEE 1 OR 2: CPT | Mod: TC,HCNC,FY,PO,LT

## 2020-02-24 PROCEDURE — 1159F MED LIST DOCD IN RCRD: CPT | Mod: HCNC,S$GLB,, | Performed by: INTERNAL MEDICINE

## 2020-02-24 PROCEDURE — 73560 XR KNEE 1 OR 2 VIEW LEFT: ICD-10-PCS | Mod: 26,HCNC,LT, | Performed by: RADIOLOGY

## 2020-02-24 PROCEDURE — 3074F SYST BP LT 130 MM HG: CPT | Mod: HCNC,CPTII,S$GLB, | Performed by: INTERNAL MEDICINE

## 2020-02-24 PROCEDURE — 73560 X-RAY EXAM OF KNEE 1 OR 2: CPT | Mod: 26,HCNC,LT, | Performed by: RADIOLOGY

## 2020-02-24 PROCEDURE — 1101F PT FALLS ASSESS-DOCD LE1/YR: CPT | Mod: HCNC,CPTII,S$GLB, | Performed by: INTERNAL MEDICINE

## 2020-02-24 PROCEDURE — 99213 PR OFFICE/OUTPT VISIT, EST, LEVL III, 20-29 MIN: ICD-10-PCS | Mod: HCNC,25,S$GLB, | Performed by: INTERNAL MEDICINE

## 2020-02-24 RX ORDER — BETAMETHASONE SODIUM PHOSPHATE AND BETAMETHASONE ACETATE 3; 3 MG/ML; MG/ML
6 INJECTION, SUSPENSION INTRA-ARTICULAR; INTRALESIONAL; INTRAMUSCULAR; SOFT TISSUE
Status: COMPLETED | OUTPATIENT
Start: 2020-02-24 | End: 2020-02-24

## 2020-02-24 RX ORDER — PREDNISONE 20 MG/1
20 TABLET ORAL DAILY
Qty: 3 TABLET | Refills: 0 | Status: SHIPPED | OUTPATIENT
Start: 2020-02-24 | End: 2020-02-27

## 2020-02-24 RX ADMIN — BETAMETHASONE SODIUM PHOSPHATE AND BETAMETHASONE ACETATE 6 MG: 3; 3 INJECTION, SUSPENSION INTRA-ARTICULAR; INTRALESIONAL; INTRAMUSCULAR; SOFT TISSUE at 02:02

## 2020-02-24 NOTE — PROGRESS NOTES
Subjective:       Patient ID: David Amaro is a 74 y.o. male.    Chief Complaint: Knee Pain and Hip Pain    Knee Pain    The incident occurred more than 1 week ago. There was no injury mechanism. The pain is present in the left knee. The quality of the pain is described as shooting. The pain is moderate. The pain has been worsening since onset. The symptoms are aggravated by movement.   Hip Pain    The incident occurred more than 1 week ago. There was no injury mechanism. The pain is present in the left hip. The pain is at a severity of 3/10. He reports no foreign bodies present.     Past Medical History:   Diagnosis Date    Arthritis     Cancer     prostate    CKD (chronic kidney disease)     Dyslipidemia     HTN (hypertension)     Hyperlipidemia     Hypothyroidism     Prostate cancer     Radiation induced proctitis     Thyroid disease      Past Surgical History:   Procedure Laterality Date    COLONOSCOPY N/A 4/3/2017    Procedure: COLONOSCOPY;  Surgeon: Olivier Grayson MD;  Location: [facility];  Service: Endoscopy;  Laterality: N/A;    HERNIA REPAIR  2009 approx    seed implants in prostate      TONSILLECTOMY       Family History   Problem Relation Age of Onset    Stroke Mother     Cancer Mother     Colon cancer Mother     Diabetes Father     Cancer Sister         lung    Cancer Brother     Bone cancer Brother     Heart disease Maternal Grandmother      Social History     Socioeconomic History    Marital status:      Spouse name: Not on file    Number of children: Not on file    Years of education: Not on file    Highest education level: Not on file   Occupational History    Not on file   Social Needs    Financial resource strain: Not hard at all    Food insecurity:     Worry: Never true     Inability: Never true    Transportation needs:     Medical: No     Non-medical: No   Tobacco Use    Smoking status: Never Smoker    Smokeless tobacco: Never Used   Substance and  Sexual Activity    Alcohol use: No     Frequency: Never     Drinks per session: Patient refused     Binge frequency: Never    Drug use: No    Sexual activity: Yes     Partners: Female   Lifestyle    Physical activity:     Days per week: 1 day     Minutes per session: 0 min    Stress: Not at all   Relationships    Social connections:     Talks on phone: Three times a week     Gets together: Twice a week     Attends Sabianist service: Not on file     Active member of club or organization: No     Attends meetings of clubs or organizations: Never     Relationship status:    Other Topics Concern    Not on file   Social History Narrative    Not on file     Review of Systems   Constitutional: Negative for activity change and unexpected weight change.   HENT: Negative for hearing loss, rhinorrhea and trouble swallowing.    Eyes: Negative for discharge and visual disturbance.   Respiratory: Negative for cough, choking, chest tightness, shortness of breath, wheezing and stridor.    Cardiovascular: Negative for chest pain and palpitations.   Gastrointestinal: Negative for blood in stool, constipation, diarrhea and vomiting.   Endocrine: Negative for polydipsia and polyuria.   Genitourinary: Negative for difficulty urinating, hematuria and urgency.   Musculoskeletal: Positive for arthralgias and joint swelling. Negative for neck pain.   Neurological: Negative for weakness and headaches.   Psychiatric/Behavioral: Negative for confusion and dysphoric mood.       Objective:      Physical Exam   Constitutional: He is oriented to person, place, and time. He appears well-developed and well-nourished.   Cardiovascular: Normal rate, regular rhythm, normal heart sounds and intact distal pulses.   Pulmonary/Chest: Effort normal and breath sounds normal. No stridor. No respiratory distress. He has no wheezes. He has no rales. He exhibits no tenderness.   Abdominal: Soft. Bowel sounds are normal.   Musculoskeletal:   Tender,  swollen left knee------   Neurological: He is alert and oriented to person, place, and time.   Nursing note and vitals reviewed.      CMP  Sodium   Date Value Ref Range Status   11/12/2019 138 136 - 145 mmol/L Final     Potassium   Date Value Ref Range Status   11/12/2019 4.5 3.5 - 5.1 mmol/L Final     Chloride   Date Value Ref Range Status   11/12/2019 108 95 - 110 mmol/L Final     CO2   Date Value Ref Range Status   11/12/2019 25 23 - 29 mmol/L Final     Glucose   Date Value Ref Range Status   11/12/2019 83 70 - 110 mg/dL Final     BUN, Bld   Date Value Ref Range Status   11/12/2019 25 (H) 8 - 23 mg/dL Final     Creatinine   Date Value Ref Range Status   11/12/2019 1.5 (H) 0.5 - 1.4 mg/dL Final     Calcium   Date Value Ref Range Status   11/12/2019 9.5 8.7 - 10.5 mg/dL Final     Total Protein   Date Value Ref Range Status   11/12/2019 7.3 6.0 - 8.4 g/dL Final     Albumin   Date Value Ref Range Status   11/12/2019 4.1 3.5 - 5.2 g/dL Final     Total Bilirubin   Date Value Ref Range Status   11/12/2019 0.5 0.1 - 1.0 mg/dL Final     Comment:     For infants and newborns, interpretation of results should be based  on gestational age, weight and in agreement with clinical  observations.  Premature Infant recommended reference ranges:  Up to 24 hours.............<8.0 mg/dL  Up to 48 hours............<12.0 mg/dL  3-5 days..................<15.0 mg/dL  6-29 days.................<15.0 mg/dL       Alkaline Phosphatase   Date Value Ref Range Status   11/12/2019 79 55 - 135 U/L Final     AST   Date Value Ref Range Status   11/12/2019 19 10 - 40 U/L Final     ALT   Date Value Ref Range Status   11/12/2019 15 10 - 44 U/L Final     Anion Gap   Date Value Ref Range Status   11/12/2019 5 (L) 8 - 16 mmol/L Final     eGFR if    Date Value Ref Range Status   11/12/2019 52.6 (A) >60 mL/min/1.73 m^2 Final     eGFR if non    Date Value Ref Range Status   11/12/2019 45.5 (A) >60 mL/min/1.73 m^2 Final      Comment:     Calculation used to obtain the estimated glomerular filtration  rate (eGFR) is the CKD-EPI equation.        Lab Results   Component Value Date    WBC 5.22 11/12/2019    HGB 13.0 (L) 11/12/2019    HCT 42.9 11/12/2019    MCV 97 11/12/2019     11/12/2019     Lab Results   Component Value Date    CHOL 196 05/15/2019     Lab Results   Component Value Date    HDL 77 (H) 05/15/2019     Lab Results   Component Value Date    LDLCALC 111.0 05/15/2019     Lab Results   Component Value Date    TRIG 40 05/15/2019     Lab Results   Component Value Date    CHOLHDL 39.3 05/15/2019     Lab Results   Component Value Date    TSH 2.982 05/15/2019     Lab Results   Component Value Date    HGBA1C 5.7 12/20/2005     Assessment:       1. Sciatica of left side    2. Acute pain of left knee        Plan:   Sciatica of left side    Acute pain of left knee  -     X-Ray Knee 1 or 2 View Left; Future; Expected date: 02/24/2020    Other orders  -     betamethasone acetate-betamethasone sodium phosphate injection 6 mg  -     predniSONE (DELTASONE) 20 MG tablet; Take 1 tablet (20 mg total) by mouth once daily. for 3 days  Dispense: 3 tablet; Refill: 0    call if persists-----------f/u 3 weeks--------------

## 2020-03-10 ENCOUNTER — OFFICE VISIT (OUTPATIENT)
Dept: FAMILY MEDICINE | Facility: CLINIC | Age: 75
End: 2020-03-10
Payer: MEDICARE

## 2020-03-10 ENCOUNTER — LAB VISIT (OUTPATIENT)
Dept: LAB | Facility: HOSPITAL | Age: 75
End: 2020-03-10
Attending: INTERNAL MEDICINE
Payer: MEDICARE

## 2020-03-10 VITALS
DIASTOLIC BLOOD PRESSURE: 72 MMHG | BODY MASS INDEX: 24.48 KG/M2 | WEIGHT: 165.81 LBS | OXYGEN SATURATION: 98 % | SYSTOLIC BLOOD PRESSURE: 128 MMHG | RESPIRATION RATE: 18 BRPM | HEART RATE: 79 BPM | TEMPERATURE: 98 F

## 2020-03-10 DIAGNOSIS — E03.9 HYPOTHYROIDISM, UNSPECIFIED TYPE: ICD-10-CM

## 2020-03-10 DIAGNOSIS — I10 ESSENTIAL HYPERTENSION: Primary | ICD-10-CM

## 2020-03-10 DIAGNOSIS — N18.30 STAGE 3 CHRONIC KIDNEY DISEASE: ICD-10-CM

## 2020-03-10 DIAGNOSIS — E78.5 DYSLIPIDEMIA: ICD-10-CM

## 2020-03-10 DIAGNOSIS — I10 ESSENTIAL HYPERTENSION: ICD-10-CM

## 2020-03-10 LAB
ANION GAP SERPL CALC-SCNC: 7 MMOL/L (ref 8–16)
BUN SERPL-MCNC: 26 MG/DL (ref 8–23)
CALCIUM SERPL-MCNC: 9 MG/DL (ref 8.7–10.5)
CHLORIDE SERPL-SCNC: 109 MMOL/L (ref 95–110)
CO2 SERPL-SCNC: 25 MMOL/L (ref 23–29)
CREAT SERPL-MCNC: 1.5 MG/DL (ref 0.5–1.4)
EST. GFR  (AFRICAN AMERICAN): 52.6 ML/MIN/1.73 M^2
EST. GFR  (NON AFRICAN AMERICAN): 45.5 ML/MIN/1.73 M^2
GLUCOSE SERPL-MCNC: 81 MG/DL (ref 70–110)
POTASSIUM SERPL-SCNC: 4.9 MMOL/L (ref 3.5–5.1)
SODIUM SERPL-SCNC: 141 MMOL/L (ref 136–145)
TSH SERPL DL<=0.005 MIU/L-ACNC: 3.2 UIU/ML (ref 0.4–4)

## 2020-03-10 PROCEDURE — 3074F SYST BP LT 130 MM HG: CPT | Mod: HCNC,CPTII,S$GLB, | Performed by: INTERNAL MEDICINE

## 2020-03-10 PROCEDURE — 99999 PR PBB SHADOW E&M-EST. PATIENT-LVL III: CPT | Mod: PBBFAC,HCNC,, | Performed by: INTERNAL MEDICINE

## 2020-03-10 PROCEDURE — 80048 BASIC METABOLIC PNL TOTAL CA: CPT | Mod: HCNC

## 2020-03-10 PROCEDURE — 3078F DIAST BP <80 MM HG: CPT | Mod: HCNC,CPTII,S$GLB, | Performed by: INTERNAL MEDICINE

## 2020-03-10 PROCEDURE — 1126F AMNT PAIN NOTED NONE PRSNT: CPT | Mod: HCNC,S$GLB,, | Performed by: INTERNAL MEDICINE

## 2020-03-10 PROCEDURE — 3078F PR MOST RECENT DIASTOLIC BLOOD PRESSURE < 80 MM HG: ICD-10-PCS | Mod: HCNC,CPTII,S$GLB, | Performed by: INTERNAL MEDICINE

## 2020-03-10 PROCEDURE — 1101F PT FALLS ASSESS-DOCD LE1/YR: CPT | Mod: HCNC,CPTII,S$GLB, | Performed by: INTERNAL MEDICINE

## 2020-03-10 PROCEDURE — 99999 PR PBB SHADOW E&M-EST. PATIENT-LVL III: ICD-10-PCS | Mod: PBBFAC,HCNC,, | Performed by: INTERNAL MEDICINE

## 2020-03-10 PROCEDURE — 36415 COLL VENOUS BLD VENIPUNCTURE: CPT | Mod: HCNC,PO

## 2020-03-10 PROCEDURE — 1101F PR PT FALLS ASSESS DOC 0-1 FALLS W/OUT INJ PAST YR: ICD-10-PCS | Mod: HCNC,CPTII,S$GLB, | Performed by: INTERNAL MEDICINE

## 2020-03-10 PROCEDURE — 99214 PR OFFICE/OUTPT VISIT, EST, LEVL IV, 30-39 MIN: ICD-10-PCS | Mod: HCNC,S$GLB,, | Performed by: INTERNAL MEDICINE

## 2020-03-10 PROCEDURE — 99214 OFFICE O/P EST MOD 30 MIN: CPT | Mod: HCNC,S$GLB,, | Performed by: INTERNAL MEDICINE

## 2020-03-10 PROCEDURE — 1159F PR MEDICATION LIST DOCUMENTED IN MEDICAL RECORD: ICD-10-PCS | Mod: HCNC,S$GLB,, | Performed by: INTERNAL MEDICINE

## 2020-03-10 PROCEDURE — 84443 ASSAY THYROID STIM HORMONE: CPT | Mod: HCNC

## 2020-03-10 PROCEDURE — 1126F PR PAIN SEVERITY QUANTIFIED, NO PAIN PRESENT: ICD-10-PCS | Mod: HCNC,S$GLB,, | Performed by: INTERNAL MEDICINE

## 2020-03-10 PROCEDURE — 3074F PR MOST RECENT SYSTOLIC BLOOD PRESSURE < 130 MM HG: ICD-10-PCS | Mod: HCNC,CPTII,S$GLB, | Performed by: INTERNAL MEDICINE

## 2020-03-10 PROCEDURE — 99499 RISK ADDL DX/OHS AUDIT: ICD-10-PCS | Mod: S$GLB,,, | Performed by: INTERNAL MEDICINE

## 2020-03-10 PROCEDURE — 1159F MED LIST DOCD IN RCRD: CPT | Mod: HCNC,S$GLB,, | Performed by: INTERNAL MEDICINE

## 2020-03-10 PROCEDURE — 99499 UNLISTED E&M SERVICE: CPT | Mod: S$GLB,,, | Performed by: INTERNAL MEDICINE

## 2020-03-10 NOTE — PROGRESS NOTES
Subjective:       Patient ID: David Amaro is a 74 y.o. male.    Chief Complaint: Follow-up; Hyperlipidemia; Hypertension; and Hypothyroidism    Follow-up   Associated symptoms include arthralgias and joint swelling. Pertinent negatives include no abdominal pain, chest pain, chills, coughing, diaphoresis, fatigue, fever, headaches, myalgias, nausea, neck pain, numbness, rash, sore throat, vomiting or weakness.   Hyperlipidemia   Pertinent negatives include no chest pain, myalgias or shortness of breath.   Hypertension   Pertinent negatives include no chest pain, headaches, neck pain, palpitations or shortness of breath.     Past Medical History:   Diagnosis Date    Arthritis     Cancer     prostate    CKD (chronic kidney disease)     Dyslipidemia     HTN (hypertension)     Hyperlipidemia     Hypothyroidism     Prostate cancer     Radiation induced proctitis     Thyroid disease      Past Surgical History:   Procedure Laterality Date    COLONOSCOPY N/A 4/3/2017    Procedure: COLONOSCOPY;  Surgeon: Olivier Grayson MD;  Location: [facility];  Service: Endoscopy;  Laterality: N/A;    HERNIA REPAIR  2009 approx    seed implants in prostate      TONSILLECTOMY       Family History   Problem Relation Age of Onset    Stroke Mother     Cancer Mother     Colon cancer Mother     Diabetes Father     Cancer Sister         lung    Cancer Brother     Bone cancer Brother     Heart disease Maternal Grandmother      Social History     Socioeconomic History    Marital status:      Spouse name: Not on file    Number of children: Not on file    Years of education: Not on file    Highest education level: Not on file   Occupational History    Not on file   Social Needs    Financial resource strain: Not hard at all    Food insecurity:     Worry: Never true     Inability: Never true    Transportation needs:     Medical: No     Non-medical: No   Tobacco Use    Smoking status: Never Smoker    Smokeless  tobacco: Never Used   Substance and Sexual Activity    Alcohol use: No     Frequency: Never     Drinks per session: Patient refused     Binge frequency: Never    Drug use: No    Sexual activity: Yes     Partners: Female   Lifestyle    Physical activity:     Days per week: 1 day     Minutes per session: 30 min    Stress: Not at all   Relationships    Social connections:     Talks on phone: Once a week     Gets together: Once a week     Attends Orthodox service: Not on file     Active member of club or organization: No     Attends meetings of clubs or organizations: Never     Relationship status:    Other Topics Concern    Not on file   Social History Narrative    Not on file     Review of Systems   Constitutional: Negative for activity change, appetite change, chills, diaphoresis, fatigue, fever and unexpected weight change.   HENT: Negative for drooling, ear discharge, ear pain, facial swelling, hearing loss, mouth sores, nosebleeds, postnasal drip, rhinorrhea, sinus pressure, sneezing, sore throat, tinnitus, trouble swallowing and voice change.    Eyes: Negative for photophobia, discharge, redness and visual disturbance.   Respiratory: Negative for apnea, cough, choking, chest tightness, shortness of breath and wheezing.    Cardiovascular: Negative for chest pain, palpitations and leg swelling.   Gastrointestinal: Negative for abdominal distention, abdominal pain, anal bleeding, blood in stool, constipation, diarrhea, nausea and vomiting.   Endocrine: Negative for cold intolerance, heat intolerance, polydipsia, polyphagia and polyuria.   Genitourinary: Negative for difficulty urinating, dysuria, enuresis, flank pain, frequency, genital sores, hematuria and urgency.   Musculoskeletal: Positive for arthralgias and joint swelling. Negative for back pain, gait problem, myalgias, neck pain and neck stiffness.   Skin: Negative for color change, pallor, rash and wound.   Allergic/Immunologic: Negative for  food allergies and immunocompromised state.   Neurological: Negative for dizziness, tremors, seizures, syncope, facial asymmetry, speech difficulty, weakness, light-headedness, numbness and headaches.   Hematological: Negative for adenopathy. Does not bruise/bleed easily.   Psychiatric/Behavioral: Negative for agitation, behavioral problems, confusion, decreased concentration, dysphoric mood, hallucinations, self-injury, sleep disturbance and suicidal ideas. The patient is not nervous/anxious and is not hyperactive.        Objective:      Physical Exam   Constitutional: He is oriented to person, place, and time. He appears well-developed and well-nourished. No distress.   HENT:   Head: Normocephalic and atraumatic.   Eyes: Pupils are equal, round, and reactive to light.   Neck: Normal range of motion. Neck supple. No JVD present. Carotid bruit is not present. No tracheal deviation present. No thyromegaly present.   Cardiovascular: Normal rate, regular rhythm, normal heart sounds and intact distal pulses.   Pulmonary/Chest: Effort normal and breath sounds normal. No respiratory distress. He has no wheezes. He has no rales. He exhibits no tenderness.   Abdominal: Soft. Bowel sounds are normal. He exhibits no distension. There is no tenderness. There is no rebound and no guarding.   Musculoskeletal: Normal range of motion. He exhibits no edema or tenderness.   Lymphadenopathy:     He has no cervical adenopathy.   Neurological: He is alert and oriented to person, place, and time.   Skin: Skin is warm and dry. No rash noted. He is not diaphoretic. No erythema. No pallor.   Psychiatric: He has a normal mood and affect. His behavior is normal. Judgment and thought content normal.   Nursing note and vitals reviewed.      CMP  Sodium   Date Value Ref Range Status   11/12/2019 138 136 - 145 mmol/L Final     Potassium   Date Value Ref Range Status   11/12/2019 4.5 3.5 - 5.1 mmol/L Final     Chloride   Date Value Ref Range  Status   11/12/2019 108 95 - 110 mmol/L Final     CO2   Date Value Ref Range Status   11/12/2019 25 23 - 29 mmol/L Final     Glucose   Date Value Ref Range Status   11/12/2019 83 70 - 110 mg/dL Final     BUN, Bld   Date Value Ref Range Status   11/12/2019 25 (H) 8 - 23 mg/dL Final     Creatinine   Date Value Ref Range Status   11/12/2019 1.5 (H) 0.5 - 1.4 mg/dL Final     Calcium   Date Value Ref Range Status   11/12/2019 9.5 8.7 - 10.5 mg/dL Final     Total Protein   Date Value Ref Range Status   11/12/2019 7.3 6.0 - 8.4 g/dL Final     Albumin   Date Value Ref Range Status   11/12/2019 4.1 3.5 - 5.2 g/dL Final     Total Bilirubin   Date Value Ref Range Status   11/12/2019 0.5 0.1 - 1.0 mg/dL Final     Comment:     For infants and newborns, interpretation of results should be based  on gestational age, weight and in agreement with clinical  observations.  Premature Infant recommended reference ranges:  Up to 24 hours.............<8.0 mg/dL  Up to 48 hours............<12.0 mg/dL  3-5 days..................<15.0 mg/dL  6-29 days.................<15.0 mg/dL       Alkaline Phosphatase   Date Value Ref Range Status   11/12/2019 79 55 - 135 U/L Final     AST   Date Value Ref Range Status   11/12/2019 19 10 - 40 U/L Final     ALT   Date Value Ref Range Status   11/12/2019 15 10 - 44 U/L Final     Anion Gap   Date Value Ref Range Status   11/12/2019 5 (L) 8 - 16 mmol/L Final     eGFR if    Date Value Ref Range Status   11/12/2019 52.6 (A) >60 mL/min/1.73 m^2 Final     eGFR if non    Date Value Ref Range Status   11/12/2019 45.5 (A) >60 mL/min/1.73 m^2 Final     Comment:     Calculation used to obtain the estimated glomerular filtration  rate (eGFR) is the CKD-EPI equation.        Lab Results   Component Value Date    WBC 5.22 11/12/2019    HGB 13.0 (L) 11/12/2019    HCT 42.9 11/12/2019    MCV 97 11/12/2019     11/12/2019     Lab Results   Component Value Date    CHOL 196 05/15/2019      Lab Results   Component Value Date    HDL 77 (H) 05/15/2019     Lab Results   Component Value Date    LDLCALC 111.0 05/15/2019     Lab Results   Component Value Date    TRIG 40 05/15/2019     Lab Results   Component Value Date    CHOLHDL 39.3 05/15/2019     Lab Results   Component Value Date    TSH 2.982 05/15/2019     Lab Results   Component Value Date    HGBA1C 5.7 12/20/2005     Assessment:       1. Essential hypertension    2. Dyslipidemia    3. Hypothyroidism, unspecified type    4. Stage 3 chronic kidney disease        Plan:   Essential hypertension  -     Basic metabolic panel; Future; Expected date: 03/10/2020    Dyslipidemia    Hypothyroidism, unspecified type  -     TSH; Future; Expected date: 03/10/2020    Stage 3 chronic kidney disease    Other orders  -     Hypertension Digital Medicine ([facility]) Enrollment Order  -     Hypertension Digital Medicine ([facility]): Assign Onboarding Questionnaires    Stable-----------------continue current meds, watch diet,exercise.    F/u 6 months------------sees urology dr delvalle/

## 2020-05-05 NOTE — PATIENT INSTRUCTIONS
Counseling and Referral of Other Preventative  (Italic type indicates deductible and co-insurance are waived)    Patient Name: David Amaro  Today's Date: 9/24/2019    Health Maintenance       Date Due Completion Date    PROSTATE-SPECIFIC ANTIGEN 04/08/2009 4/8/2008 (Done)    Override on 4/8/2008: Done    Shingles Vaccine (2 of 3) 09/24/2012 7/30/2012    Pneumococcal Vaccine (65+ High/Highest Risk) (2 of 2 - PPSV23) 02/23/2017 12/29/2016    Influenza Vaccine (1) 09/01/2019 9/26/2018    Lipid Panel 05/15/2020 5/15/2019    Colonoscopy 04/03/2022 4/3/2017    Override on 3/4/2009: Done        No orders of the defined types were placed in this encounter.    The following information is provided to all patients.  This information is to help you find resources for any of the problems found today that may be affecting your health:                Living healthy guide: www.[facility].[place].[facility]      Understanding Diabetes: www.diabetes.org      Eating healthy: www.cdc.gov/healthyweight      CDC home safety checklist: www.cdc.gov/steadi/patient.html      Agency on Aging: www.goea.[place].[facility]      Alcoholics anonymous (AA): www.aa.org      Physical Activity: www.chuck.nih.gov/kv0pflp      Tobacco use: www.quitwithusla.org      Pt tolerated blood transfusion well. Discharged ambulatory with walker and accompanied by wife.

## 2020-06-09 ENCOUNTER — OFFICE VISIT (OUTPATIENT)
Dept: FAMILY MEDICINE | Facility: CLINIC | Age: 75
End: 2020-06-09
Payer: MEDICARE

## 2020-06-09 VITALS
SYSTOLIC BLOOD PRESSURE: 124 MMHG | HEIGHT: 69 IN | WEIGHT: 171.19 LBS | DIASTOLIC BLOOD PRESSURE: 80 MMHG | OXYGEN SATURATION: 98 % | BODY MASS INDEX: 25.36 KG/M2 | TEMPERATURE: 99 F | RESPIRATION RATE: 18 BRPM | HEART RATE: 60 BPM

## 2020-06-09 DIAGNOSIS — Z00.00 ENCOUNTER FOR PREVENTIVE HEALTH EXAMINATION: Primary | ICD-10-CM

## 2020-06-09 DIAGNOSIS — N40.0 BENIGN PROSTATIC HYPERPLASIA, UNSPECIFIED WHETHER LOWER URINARY TRACT SYMPTOMS PRESENT: ICD-10-CM

## 2020-06-09 DIAGNOSIS — E23.6 EMPTY SELLA: ICD-10-CM

## 2020-06-09 DIAGNOSIS — E78.5 DYSLIPIDEMIA: ICD-10-CM

## 2020-06-09 DIAGNOSIS — N18.30 STAGE 3 CHRONIC KIDNEY DISEASE: ICD-10-CM

## 2020-06-09 DIAGNOSIS — M16.11 OSTEOARTHRITIS OF RIGHT HIP, UNSPECIFIED OSTEOARTHRITIS TYPE: ICD-10-CM

## 2020-06-09 DIAGNOSIS — N25.81 HYPERPARATHYROIDISM, SECONDARY RENAL: ICD-10-CM

## 2020-06-09 DIAGNOSIS — E03.9 HYPOTHYROIDISM, UNSPECIFIED TYPE: ICD-10-CM

## 2020-06-09 DIAGNOSIS — Z86.010 HISTORY OF COLON POLYPS: ICD-10-CM

## 2020-06-09 DIAGNOSIS — C61 PROSTATE CANCER: ICD-10-CM

## 2020-06-09 DIAGNOSIS — I10 ESSENTIAL HYPERTENSION: ICD-10-CM

## 2020-06-09 PROCEDURE — 99999 PR PBB SHADOW E&M-EST. PATIENT-LVL IV: ICD-10-PCS | Mod: PBBFAC,HCNC,, | Performed by: NURSE PRACTITIONER

## 2020-06-09 PROCEDURE — 99999 PR PBB SHADOW E&M-EST. PATIENT-LVL IV: CPT | Mod: PBBFAC,HCNC,, | Performed by: NURSE PRACTITIONER

## 2020-06-09 PROCEDURE — 3074F PR MOST RECENT SYSTOLIC BLOOD PRESSURE < 130 MM HG: ICD-10-PCS | Mod: HCNC,CPTII,S$GLB, | Performed by: NURSE PRACTITIONER

## 2020-06-09 PROCEDURE — G0439 PPPS, SUBSEQ VISIT: HCPCS | Mod: HCNC,S$GLB,, | Performed by: NURSE PRACTITIONER

## 2020-06-09 PROCEDURE — 3074F SYST BP LT 130 MM HG: CPT | Mod: HCNC,CPTII,S$GLB, | Performed by: NURSE PRACTITIONER

## 2020-06-09 PROCEDURE — G0439 PR MEDICARE ANNUAL WELLNESS SUBSEQUENT VISIT: ICD-10-PCS | Mod: HCNC,S$GLB,, | Performed by: NURSE PRACTITIONER

## 2020-06-09 PROCEDURE — 3079F DIAST BP 80-89 MM HG: CPT | Mod: HCNC,CPTII,S$GLB, | Performed by: NURSE PRACTITIONER

## 2020-06-09 PROCEDURE — 3079F PR MOST RECENT DIASTOLIC BLOOD PRESSURE 80-89 MM HG: ICD-10-PCS | Mod: HCNC,CPTII,S$GLB, | Performed by: NURSE PRACTITIONER

## 2020-06-09 RX ORDER — ERGOCALCIFEROL 1.25 MG/1
50000 CAPSULE ORAL
COMMUNITY
End: 2021-03-22

## 2020-06-09 NOTE — Clinical Note
Your patient was seen today for a HRA visit.I have included a copy of my visit note, please review the note and feel free to contact me with any questions. Thank you for allowing me to participate in the care of your patients. Larisa Bruner NP

## 2020-06-09 NOTE — PATIENT INSTRUCTIONS
Counseling and Referral of Other Preventative  (Italic type indicates deductible and co-insurance are waived)    Patient Name: David Amaro  Today's Date: 6/9/2020    Health Maintenance       Date Due Completion Date    Shingles Vaccine (2 of 3) 09/24/2012 7/30/2012    Lipid Panel 05/15/2020 5/15/2019    Pneumococcal Vaccine (65+ High/Highest Risk) (2 of 2 - PPSV23) 09/24/2020 (Originally 2/23/2017) 12/29/2016    PROSTATE-SPECIFIC ANTIGEN 07/31/2020 7/31/2019 (Done)    Override on 7/31/2019: Done (Dr. Velez -urologist)    Override on 4/8/2008: Done    Colonoscopy 04/03/2022 4/3/2017    Override on 3/4/2009: Done        No orders of the defined types were placed in this encounter.    The following information is provided to all patients.  This information is to help you find resources for any of the problems found today that may be affecting your health:                Living healthy guide: www.[facility].[place].[facility]      Understanding Diabetes: www.diabetes.org      Eating healthy: www.cdc.gov/healthyweight      CDC home safety checklist: www.cdc.gov/steadi/patient.html      Agency on Aging: www.goea.[place].gov      Alcoholics anonymous (AA): www.aa.org      Physical Activity: www.chuck.nih.gov/fl7rgvh      Tobacco use: www.quitwithusla.org     Counseling and Referral of Other Preventative  (Italic type indicates deductible and co-insurance are waived)    Patient Name: David Amaro  Today's Date: 6/9/2020    Health Maintenance       Date Due Completion Date    Shingles Vaccine (2 of 3) 09/24/2012 7/30/2012    Lipid Panel 05/15/2020 5/15/2019    Pneumococcal Vaccine (65+ High/Highest Risk) (2 of 2 - PPSV23) 09/24/2020 (Originally 2/23/2017) 12/29/2016    PROSTATE-SPECIFIC ANTIGEN 07/31/2020 7/31/2019 (Done)    Override on 7/31/2019: Done (Dr. Velez -urologist)    Override on 4/8/2008: Done    Colonoscopy 04/03/2022 4/3/2017    Override on 3/4/2009: Done        No orders of the defined types were placed in  this encounter.    The following information is provided to all patients.  This information is to help you find resources for any of the problems found today that may be affecting your health:                Living healthy guide: www.[facility].[place].[facility]      Understanding Diabetes: www.diabetes.org      Eating healthy: www.cdc.gov/healthyweight      CDC home safety checklist: www.cdc.gov/steadi/patient.html      Agency on Aging: www.goea.[place].[facility]      Alcoholics anonymous (AA): www.aa.org      Physical Activity: www.chuck.nih.gov/ik2trtb      Tobacco use: www.quitwithusla.org

## 2020-07-13 ENCOUNTER — TELEPHONE (OUTPATIENT)
Dept: NEPHROLOGY | Facility: CLINIC | Age: 75
End: 2020-07-13

## 2020-07-13 RX ORDER — DOXAZOSIN 4 MG/1
4 TABLET ORAL NIGHTLY
Qty: 30 TABLET | Refills: 11 | Status: SHIPPED | OUTPATIENT
Start: 2020-07-13 | End: 2021-06-20

## 2020-07-13 NOTE — TELEPHONE ENCOUNTER
----- Message from Joy Kenney sent at 7/13/2020 11:08 AM CDT -----  Regarding: Medication  Refill      Medication  Refill  Request      Please refill the medication(s) listed below. Please call the patient when the prescription(s) is ready for  at this phone number   [phone]       Medication #1  doxazosin (CARDURA) 4 MG tablet          Preferred Pharmacy:  [id number] [address] [facility] AT [facility] [address]     [phone] (Phone)  [phone] (Fax)

## 2020-07-13 NOTE — TELEPHONE ENCOUNTER
----- Message from Joy Kenney sent at 7/13/2020 11:12 AM CDT -----  Regarding: Medication Refill  Medication  Refill  Request      Please refill the medication(s) listed below. Please call the patient when the prescription(s) is ready for  at this phone number   [phone]       Medication #1  doxazosin (CARDURA) 4 MG tablet          Preferred Pharmacy:  [id number] [address] [facility] AT [facility] [address]     [phone] (Phone)  [phone] (Fax)

## 2020-09-14 ENCOUNTER — OFFICE VISIT (OUTPATIENT)
Dept: FAMILY MEDICINE | Facility: CLINIC | Age: 75
End: 2020-09-14
Payer: MEDICARE

## 2020-09-14 ENCOUNTER — LAB VISIT (OUTPATIENT)
Dept: LAB | Facility: HOSPITAL | Age: 75
End: 2020-09-14
Payer: MEDICARE

## 2020-09-14 VITALS
BODY MASS INDEX: 25.46 KG/M2 | HEART RATE: 78 BPM | DIASTOLIC BLOOD PRESSURE: 78 MMHG | TEMPERATURE: 98 F | SYSTOLIC BLOOD PRESSURE: 122 MMHG | OXYGEN SATURATION: 97 % | WEIGHT: 172.38 LBS

## 2020-09-14 DIAGNOSIS — N40.0 BENIGN PROSTATIC HYPERPLASIA, UNSPECIFIED WHETHER LOWER URINARY TRACT SYMPTOMS PRESENT: ICD-10-CM

## 2020-09-14 DIAGNOSIS — I10 ESSENTIAL HYPERTENSION: Primary | ICD-10-CM

## 2020-09-14 DIAGNOSIS — E78.5 DYSLIPIDEMIA: ICD-10-CM

## 2020-09-14 DIAGNOSIS — E03.9 HYPOTHYROIDISM, UNSPECIFIED TYPE: ICD-10-CM

## 2020-09-14 DIAGNOSIS — N18.30 STAGE 3 CHRONIC KIDNEY DISEASE: ICD-10-CM

## 2020-09-14 DIAGNOSIS — I10 ESSENTIAL HYPERTENSION: ICD-10-CM

## 2020-09-14 LAB
ALBUMIN SERPL BCP-MCNC: 3.5 G/DL (ref 3.5–5.2)
ALP SERPL-CCNC: 69 U/L (ref 55–135)
ALT SERPL W/O P-5'-P-CCNC: 15 U/L (ref 10–44)
ANION GAP SERPL CALC-SCNC: 6 MMOL/L (ref 8–16)
AST SERPL-CCNC: 16 U/L (ref 10–40)
BASOPHILS # BLD AUTO: 0.04 K/UL (ref 0–0.2)
BASOPHILS NFR BLD: 0.7 % (ref 0–1.9)
BILIRUB SERPL-MCNC: 0.5 MG/DL (ref 0.1–1)
BUN SERPL-MCNC: 21 MG/DL (ref 8–23)
CALCIUM SERPL-MCNC: 8.6 MG/DL (ref 8.7–10.5)
CHLORIDE SERPL-SCNC: 109 MMOL/L (ref 95–110)
CHOLEST SERPL-MCNC: 200 MG/DL (ref 120–199)
CHOLEST/HDLC SERPL: 2.8 {RATIO} (ref 2–5)
CO2 SERPL-SCNC: 26 MMOL/L (ref 23–29)
CREAT SERPL-MCNC: 1.2 MG/DL (ref 0.5–1.4)
DIFFERENTIAL METHOD: ABNORMAL
EOSINOPHIL # BLD AUTO: 0.1 K/UL (ref 0–0.5)
EOSINOPHIL NFR BLD: 1 % (ref 0–8)
ERYTHROCYTE [DISTWIDTH] IN BLOOD BY AUTOMATED COUNT: 13.1 % (ref 11.5–14.5)
EST. GFR  (AFRICAN AMERICAN): >60 ML/MIN/1.73 M^2
EST. GFR  (NON AFRICAN AMERICAN): 59.2 ML/MIN/1.73 M^2
GLUCOSE SERPL-MCNC: 74 MG/DL (ref 70–110)
HCT VFR BLD AUTO: 37.6 % (ref 40–54)
HDLC SERPL-MCNC: 71 MG/DL (ref 40–75)
HDLC SERPL: 35.5 % (ref 20–50)
HGB BLD-MCNC: 11.7 G/DL (ref 14–18)
IMM GRANULOCYTES # BLD AUTO: 0.02 K/UL (ref 0–0.04)
IMM GRANULOCYTES NFR BLD AUTO: 0.3 % (ref 0–0.5)
LDLC SERPL CALC-MCNC: 123.4 MG/DL (ref 63–159)
LYMPHOCYTES # BLD AUTO: 1.6 K/UL (ref 1–4.8)
LYMPHOCYTES NFR BLD: 26.9 % (ref 18–48)
MCH RBC QN AUTO: 30.2 PG (ref 27–31)
MCHC RBC AUTO-ENTMCNC: 31.1 G/DL (ref 32–36)
MCV RBC AUTO: 97 FL (ref 82–98)
MONOCYTES # BLD AUTO: 0.5 K/UL (ref 0.3–1)
MONOCYTES NFR BLD: 7.7 % (ref 4–15)
NEUTROPHILS # BLD AUTO: 3.8 K/UL (ref 1.8–7.7)
NEUTROPHILS NFR BLD: 63.4 % (ref 38–73)
NONHDLC SERPL-MCNC: 129 MG/DL
NRBC BLD-RTO: 0 /100 WBC
PLATELET # BLD AUTO: 185 K/UL (ref 150–350)
PMV BLD AUTO: 10.7 FL (ref 9.2–12.9)
POTASSIUM SERPL-SCNC: 4.2 MMOL/L (ref 3.5–5.1)
PROT SERPL-MCNC: 6.2 G/DL (ref 6–8.4)
RBC # BLD AUTO: 3.87 M/UL (ref 4.6–6.2)
SODIUM SERPL-SCNC: 141 MMOL/L (ref 136–145)
TRIGL SERPL-MCNC: 28 MG/DL (ref 30–150)
WBC # BLD AUTO: 5.94 K/UL (ref 3.9–12.7)

## 2020-09-14 PROCEDURE — 80053 COMPREHEN METABOLIC PANEL: CPT | Mod: HCNC

## 2020-09-14 PROCEDURE — 36415 COLL VENOUS BLD VENIPUNCTURE: CPT | Mod: HCNC,PO

## 2020-09-14 PROCEDURE — 1126F AMNT PAIN NOTED NONE PRSNT: CPT | Mod: HCNC,S$GLB,, | Performed by: INTERNAL MEDICINE

## 2020-09-14 PROCEDURE — 3008F PR BODY MASS INDEX (BMI) DOCUMENTED: ICD-10-PCS | Mod: HCNC,CPTII,S$GLB, | Performed by: INTERNAL MEDICINE

## 2020-09-14 PROCEDURE — 99999 PR PBB SHADOW E&M-EST. PATIENT-LVL III: CPT | Mod: PBBFAC,HCNC,, | Performed by: INTERNAL MEDICINE

## 2020-09-14 PROCEDURE — 90662 FLU VACCINE - QUADRIVALENT - HIGH DOSE (65+) PRESERVATIVE FREE IM: ICD-10-PCS | Mod: HCNC,S$GLB,, | Performed by: INTERNAL MEDICINE

## 2020-09-14 PROCEDURE — 1159F PR MEDICATION LIST DOCUMENTED IN MEDICAL RECORD: ICD-10-PCS | Mod: HCNC,S$GLB,, | Performed by: INTERNAL MEDICINE

## 2020-09-14 PROCEDURE — 99214 PR OFFICE/OUTPT VISIT, EST, LEVL IV, 30-39 MIN: ICD-10-PCS | Mod: HCNC,25,S$GLB, | Performed by: INTERNAL MEDICINE

## 2020-09-14 PROCEDURE — 85025 COMPLETE CBC W/AUTO DIFF WBC: CPT | Mod: HCNC

## 2020-09-14 PROCEDURE — 99999 PR PBB SHADOW E&M-EST. PATIENT-LVL III: ICD-10-PCS | Mod: PBBFAC,HCNC,, | Performed by: INTERNAL MEDICINE

## 2020-09-14 PROCEDURE — 90662 IIV NO PRSV INCREASED AG IM: CPT | Mod: HCNC,S$GLB,, | Performed by: INTERNAL MEDICINE

## 2020-09-14 PROCEDURE — 1159F MED LIST DOCD IN RCRD: CPT | Mod: HCNC,S$GLB,, | Performed by: INTERNAL MEDICINE

## 2020-09-14 PROCEDURE — 3078F PR MOST RECENT DIASTOLIC BLOOD PRESSURE < 80 MM HG: ICD-10-PCS | Mod: HCNC,CPTII,S$GLB, | Performed by: INTERNAL MEDICINE

## 2020-09-14 PROCEDURE — 3008F BODY MASS INDEX DOCD: CPT | Mod: HCNC,CPTII,S$GLB, | Performed by: INTERNAL MEDICINE

## 2020-09-14 PROCEDURE — 1101F PT FALLS ASSESS-DOCD LE1/YR: CPT | Mod: HCNC,CPTII,S$GLB, | Performed by: INTERNAL MEDICINE

## 2020-09-14 PROCEDURE — 99499 RISK ADDL DX/OHS AUDIT: ICD-10-PCS | Mod: S$GLB,,, | Performed by: INTERNAL MEDICINE

## 2020-09-14 PROCEDURE — 1101F PR PT FALLS ASSESS DOC 0-1 FALLS W/OUT INJ PAST YR: ICD-10-PCS | Mod: HCNC,CPTII,S$GLB, | Performed by: INTERNAL MEDICINE

## 2020-09-14 PROCEDURE — 1126F PR PAIN SEVERITY QUANTIFIED, NO PAIN PRESENT: ICD-10-PCS | Mod: HCNC,S$GLB,, | Performed by: INTERNAL MEDICINE

## 2020-09-14 PROCEDURE — G0008 FLU VACCINE - QUADRIVALENT - HIGH DOSE (65+) PRESERVATIVE FREE IM: ICD-10-PCS | Mod: HCNC,S$GLB,, | Performed by: INTERNAL MEDICINE

## 2020-09-14 PROCEDURE — 99499 UNLISTED E&M SERVICE: CPT | Mod: S$GLB,,, | Performed by: INTERNAL MEDICINE

## 2020-09-14 PROCEDURE — 3074F PR MOST RECENT SYSTOLIC BLOOD PRESSURE < 130 MM HG: ICD-10-PCS | Mod: HCNC,CPTII,S$GLB, | Performed by: INTERNAL MEDICINE

## 2020-09-14 PROCEDURE — 99214 OFFICE O/P EST MOD 30 MIN: CPT | Mod: HCNC,25,S$GLB, | Performed by: INTERNAL MEDICINE

## 2020-09-14 PROCEDURE — 80061 LIPID PANEL: CPT | Mod: HCNC

## 2020-09-14 PROCEDURE — 3074F SYST BP LT 130 MM HG: CPT | Mod: HCNC,CPTII,S$GLB, | Performed by: INTERNAL MEDICINE

## 2020-09-14 PROCEDURE — G0008 ADMIN INFLUENZA VIRUS VAC: HCPCS | Mod: HCNC,S$GLB,, | Performed by: INTERNAL MEDICINE

## 2020-09-14 PROCEDURE — 3078F DIAST BP <80 MM HG: CPT | Mod: HCNC,CPTII,S$GLB, | Performed by: INTERNAL MEDICINE

## 2020-09-14 NOTE — PROGRESS NOTES
Subjective:       Patient ID: David Amaro is a 75 y.o. male.    Chief Complaint: Follow-up, Hypertension, Hyperlipidemia, and Chronic Kidney Disease    Follow-up  Pertinent negatives include no abdominal pain, arthralgias, chest pain, chills, coughing, diaphoresis, fatigue, fever, headaches, joint swelling, myalgias, nausea, neck pain, numbness, rash, sore throat, vomiting or weakness.   Hypertension  Pertinent negatives include no chest pain, headaches, neck pain, palpitations or shortness of breath.   Hyperlipidemia  Pertinent negatives include no chest pain, myalgias or shortness of breath.     Past Medical History:   Diagnosis Date    Arthritis     Cancer     prostate    CKD (chronic kidney disease)     Dyslipidemia     HTN (hypertension)     Hyperlipidemia     Hypothyroidism     Prostate cancer     Radiation induced proctitis     Thyroid disease      Past Surgical History:   Procedure Laterality Date    COLONOSCOPY N/A 4/3/2017    Procedure: COLONOSCOPY;  Surgeon: Olivier Grayson MD;  Location: [facility];  Service: Endoscopy;  Laterality: N/A;    HERNIA REPAIR  2009 approx    seed implants in prostate      TONSILLECTOMY       Family History   Problem Relation Age of Onset    Stroke Mother     Cancer Mother     Colon cancer Mother     Diabetes Father     Cancer Sister         lung    Cancer Brother     Bone cancer Brother     Heart disease Maternal Grandmother      Social History     Socioeconomic History    Marital status:      Spouse name: Not on file    Number of children: Not on file    Years of education: Not on file    Highest education level: Not on file   Occupational History    Not on file   Social Needs    Financial resource strain: Not hard at all    Food insecurity     Worry: Never true     Inability: Never true    Transportation needs     Medical: No     Non-medical: No   Tobacco Use    Smoking status: Never Smoker    Smokeless tobacco: Never Used    Substance and Sexual Activity    Alcohol use: No     Frequency: Never     Drinks per session: Patient refused     Binge frequency: Never    Drug use: No    Sexual activity: Yes     Partners: Female   Lifestyle    Physical activity     Days per week: 1 day     Minutes per session: 30 min    Stress: Not at all   Relationships    Social connections     Talks on phone: Once a week     Gets together: Once a week     Attends Rastafari service: Not on file     Active member of club or organization: No     Attends meetings of clubs or organizations: Never     Relationship status:    Other Topics Concern    Not on file   Social History Narrative    Not on file     Review of Systems   Constitutional: Negative for activity change, appetite change, chills, diaphoresis, fatigue, fever and unexpected weight change.   HENT: Negative for drooling, ear discharge, ear pain, facial swelling, hearing loss, mouth sores, nosebleeds, postnasal drip, rhinorrhea, sinus pressure, sneezing, sore throat, tinnitus, trouble swallowing and voice change.    Eyes: Negative for photophobia, redness and visual disturbance.   Respiratory: Negative for apnea, cough, choking, chest tightness, shortness of breath and wheezing.    Cardiovascular: Negative for chest pain, palpitations and leg swelling.   Gastrointestinal: Negative for abdominal distention, abdominal pain, anal bleeding, blood in stool, constipation, diarrhea, nausea, rectal pain and vomiting.   Endocrine: Negative for cold intolerance, heat intolerance, polydipsia, polyphagia and polyuria.   Genitourinary: Negative for difficulty urinating, dysuria, enuresis, flank pain, frequency, genital sores, hematuria and urgency.   Musculoskeletal: Negative for arthralgias, back pain, gait problem, joint swelling, myalgias, neck pain and neck stiffness.   Skin: Negative for color change, pallor, rash and wound.   Allergic/Immunologic: Negative for food allergies and immunocompromised  state.   Neurological: Negative for dizziness, tremors, seizures, syncope, facial asymmetry, speech difficulty, weakness, light-headedness, numbness and headaches.   Hematological: Negative for adenopathy. Does not bruise/bleed easily.   Psychiatric/Behavioral: Negative for agitation, behavioral problems, confusion, decreased concentration, dysphoric mood, hallucinations, self-injury, sleep disturbance and suicidal ideas. The patient is not nervous/anxious and is not hyperactive.        Objective:      Physical Exam  Vitals signs and nursing note reviewed.   Constitutional:       General: He is not in acute distress.     Appearance: He is well-developed. He is not diaphoretic.   HENT:      Head: Normocephalic and atraumatic.      Mouth/Throat:      Pharynx: No oropharyngeal exudate.   Eyes:      General: No scleral icterus.     Pupils: Pupils are equal, round, and reactive to light.   Neck:      Musculoskeletal: Normal range of motion and neck supple.      Thyroid: No thyromegaly.      Vascular: No carotid bruit or JVD.      Trachea: No tracheal deviation.   Cardiovascular:      Rate and Rhythm: Normal rate and regular rhythm.      Heart sounds: Normal heart sounds.   Pulmonary:      Effort: Pulmonary effort is normal. No respiratory distress.      Breath sounds: Normal breath sounds. No wheezing or rales.   Chest:      Chest wall: No tenderness.   Abdominal:      General: Bowel sounds are normal. There is no distension.      Palpations: Abdomen is soft.      Tenderness: There is no abdominal tenderness. There is no guarding or rebound.   Musculoskeletal: Normal range of motion.         General: No tenderness.   Lymphadenopathy:      Cervical: No cervical adenopathy.   Skin:     General: Skin is warm and dry.      Coloration: Skin is not pale.      Findings: No erythema or rash.   Neurological:      Mental Status: He is alert and oriented to person, place, and time.      Cranial Nerves: No cranial nerve deficit.       Coordination: Coordination normal.   Psychiatric:         Behavior: Behavior normal.         Thought Content: Thought content normal.         Judgment: Judgment normal.         CMP  Sodium   Date Value Ref Range Status   03/10/2020 141 136 - 145 mmol/L Final     Potassium   Date Value Ref Range Status   03/10/2020 4.9 3.5 - 5.1 mmol/L Final     Chloride   Date Value Ref Range Status   03/10/2020 109 95 - 110 mmol/L Final     CO2   Date Value Ref Range Status   03/10/2020 25 23 - 29 mmol/L Final     Glucose   Date Value Ref Range Status   03/10/2020 81 70 - 110 mg/dL Final     BUN, Bld   Date Value Ref Range Status   03/10/2020 26 (H) 8 - 23 mg/dL Final     Creatinine   Date Value Ref Range Status   03/10/2020 1.5 (H) 0.5 - 1.4 mg/dL Final     Calcium   Date Value Ref Range Status   03/10/2020 9.0 8.7 - 10.5 mg/dL Final     Total Protein   Date Value Ref Range Status   11/12/2019 7.3 6.0 - 8.4 g/dL Final     Albumin   Date Value Ref Range Status   11/12/2019 4.1 3.5 - 5.2 g/dL Final     Total Bilirubin   Date Value Ref Range Status   11/12/2019 0.5 0.1 - 1.0 mg/dL Final     Comment:     For infants and newborns, interpretation of results should be based  on gestational age, weight and in agreement with clinical  observations.  Premature Infant recommended reference ranges:  Up to 24 hours.............<8.0 mg/dL  Up to 48 hours............<12.0 mg/dL  3-5 days..................<15.0 mg/dL  6-29 days.................<15.0 mg/dL       Alkaline Phosphatase   Date Value Ref Range Status   11/12/2019 79 55 - 135 U/L Final     AST   Date Value Ref Range Status   11/12/2019 19 10 - 40 U/L Final     ALT   Date Value Ref Range Status   11/12/2019 15 10 - 44 U/L Final     Anion Gap   Date Value Ref Range Status   03/10/2020 7 (L) 8 - 16 mmol/L Final     eGFR if    Date Value Ref Range Status   03/10/2020 52.6 (A) >60 mL/min/1.73 m^2 Final     eGFR if non    Date Value Ref Range Status    03/10/2020 45.5 (A) >60 mL/min/1.73 m^2 Final     Comment:     Calculation used to obtain the estimated glomerular filtration  rate (eGFR) is the CKD-EPI equation.        Lab Results   Component Value Date    WBC 5.22 11/12/2019    HGB 13.0 (L) 11/12/2019    HCT 42.9 11/12/2019    MCV 97 11/12/2019     11/12/2019     Lab Results   Component Value Date    CHOL 196 05/15/2019     Lab Results   Component Value Date    HDL 77 (H) 05/15/2019     Lab Results   Component Value Date    LDLCALC 111.0 05/15/2019     Lab Results   Component Value Date    TRIG 40 05/15/2019     Lab Results   Component Value Date    CHOLHDL 39.3 05/15/2019     Lab Results   Component Value Date    TSH 3.197 03/10/2020     Lab Results   Component Value Date    HGBA1C 5.7 12/20/2005     Assessment:       1. Essential hypertension    2. Dyslipidemia    3. Benign prostatic hyperplasia, unspecified whether lower urinary tract symptoms present    4. Stage 3 chronic kidney disease    5. Hypothyroidism, unspecified type        Plan:   Essential hypertension  -     Comprehensive metabolic panel; Future; Expected date: 09/14/2020  -     CBC auto differential; Future; Expected date: 09/14/2020    Dyslipidemia  -     Lipid Panel; Future; Expected date: 09/14/2020    Benign prostatic hyperplasia, unspecified whether lower urinary tract symptoms present    Stage 3 chronic kidney disease    Hypothyroidism, unspecified type    Stable-----------------continue meds-------------sees urology dr delvalle.        F/u 6 months--------------

## 2020-12-03 ENCOUNTER — TELEPHONE (OUTPATIENT)
Dept: FAMILY MEDICINE | Facility: CLINIC | Age: 75
End: 2020-12-03

## 2020-12-03 ENCOUNTER — TELEPHONE (OUTPATIENT)
Dept: NEPHROLOGY | Facility: CLINIC | Age: 75
End: 2020-12-03

## 2020-12-03 DIAGNOSIS — N18.2 STAGE 2 CHRONIC KIDNEY DISEASE: Primary | ICD-10-CM

## 2020-12-03 NOTE — TELEPHONE ENCOUNTER
----- Message from Kyra Gilbert sent at 12/3/2020  1:52 PM CST -----  Regarding: request call back  .Type:  Patient Requesting Referral    Who Called: pt  Does the patient already have the specialty appointment scheduled?:   If yes, what is the date of that appointment?:   Referral to What Specialty: nephrology   Reason for Referral:   Does the patient want the referral with a specific physician?:   [facility] the [facility]   Patient Requesting a Response?: yes   Would the patient rather a call back or a response via [facility]? Call back   Best Call Back Number: [phone] (home)   Additional Information:

## 2020-12-03 NOTE — TELEPHONE ENCOUNTER
----- Message from Kimberlyn Clancy sent at 12/3/2020  1:26 PM CST -----  Regarding: who are the [facility] Dr's in the dept and will be coming  Type:  Needs Medical Advice    Who Called: pt  Symptoms (please be specific): n/a   How long has patient had these symptoms:  n/a  Pharmacy name and phone #:  n/a  Would the patient rather a call back or a response via [facility]? Call back  Best Call Back Number: [phone]  Additional Information: Please call back Thanks

## 2020-12-03 NOTE — TELEPHONE ENCOUNTER
----- Message from Kiara Hargrove sent at 12/3/2020 12:49 PM CST -----  Regarding: orders  Good afternoon,        Pt would like to get labs completed prior to appointment in Feb. Pt can be reached at [phone]    Kiara Navarro

## 2020-12-04 ENCOUNTER — TELEPHONE (OUTPATIENT)
Dept: NEPHROLOGY | Facility: CLINIC | Age: 75
End: 2020-12-04

## 2020-12-04 ENCOUNTER — TELEPHONE (OUTPATIENT)
Dept: FAMILY MEDICINE | Facility: CLINIC | Age: 75
End: 2020-12-04

## 2020-12-04 ENCOUNTER — LAB VISIT (OUTPATIENT)
Dept: LAB | Facility: HOSPITAL | Age: 75
End: 2020-12-04
Attending: INTERNAL MEDICINE
Payer: MEDICARE

## 2020-12-04 DIAGNOSIS — N18.30 STAGE 3 CHRONIC KIDNEY DISEASE, UNSPECIFIED WHETHER STAGE 3A OR 3B CKD: Primary | ICD-10-CM

## 2020-12-04 DIAGNOSIS — N18.30 STAGE 3 CHRONIC KIDNEY DISEASE, UNSPECIFIED WHETHER STAGE 3A OR 3B CKD: ICD-10-CM

## 2020-12-04 LAB
ALBUMIN SERPL BCP-MCNC: 3.5 G/DL (ref 3.5–5.2)
ANION GAP SERPL CALC-SCNC: 8 MMOL/L (ref 8–16)
BUN SERPL-MCNC: 20 MG/DL (ref 8–23)
CALCIUM SERPL-MCNC: 8.8 MG/DL (ref 8.7–10.5)
CHLORIDE SERPL-SCNC: 112 MMOL/L (ref 95–110)
CO2 SERPL-SCNC: 23 MMOL/L (ref 23–29)
CREAT SERPL-MCNC: 1.2 MG/DL (ref 0.5–1.4)
EST. GFR  (AFRICAN AMERICAN): >60 ML/MIN/1.73 M^2
EST. GFR  (NON AFRICAN AMERICAN): 59 ML/MIN/1.73 M^2
GLUCOSE SERPL-MCNC: 91 MG/DL (ref 70–110)
PHOSPHATE SERPL-MCNC: 2.7 MG/DL (ref 2.7–4.5)
POTASSIUM SERPL-SCNC: 4.1 MMOL/L (ref 3.5–5.1)
PTH-INTACT SERPL-MCNC: 64.9 PG/ML (ref 9–77)
SODIUM SERPL-SCNC: 143 MMOL/L (ref 136–145)

## 2020-12-04 PROCEDURE — 80069 RENAL FUNCTION PANEL: CPT | Mod: HCNC

## 2020-12-04 PROCEDURE — 36415 COLL VENOUS BLD VENIPUNCTURE: CPT | Mod: HCNC,PO

## 2020-12-04 PROCEDURE — 83970 ASSAY OF PARATHORMONE: CPT | Mod: HCNC

## 2020-12-04 NOTE — TELEPHONE ENCOUNTER
----- Message from Goldie Nation sent at 12/3/2020  4:11 PM CST -----  Type:  Patient Returning Call    Who Called pt  David   Who Left Message for Patient:  Dr Pierre office   Does the patient know what this is regarding?:   an appt   Would the patient rather a call back or a response via [facility]?   Callback   Best Call Back Number: [phone]  Additional Information:  please call to discuss//[email or web address]

## 2020-12-07 ENCOUNTER — OFFICE VISIT (OUTPATIENT)
Dept: NEPHROLOGY | Facility: CLINIC | Age: 75
End: 2020-12-07
Payer: MEDICARE

## 2020-12-07 VITALS
SYSTOLIC BLOOD PRESSURE: 146 MMHG | RESPIRATION RATE: 20 BRPM | DIASTOLIC BLOOD PRESSURE: 90 MMHG | HEIGHT: 69 IN | HEART RATE: 60 BPM | BODY MASS INDEX: 25.14 KG/M2 | WEIGHT: 169.75 LBS

## 2020-12-07 DIAGNOSIS — N18.2 STAGE 2 CHRONIC KIDNEY DISEASE: ICD-10-CM

## 2020-12-07 PROCEDURE — 99999 PR PBB SHADOW E&M-EST. PATIENT-LVL IV: CPT | Mod: PBBFAC,HCNC,, | Performed by: INTERNAL MEDICINE

## 2020-12-07 PROCEDURE — 3008F BODY MASS INDEX DOCD: CPT | Mod: HCNC,CPTII,S$GLB, | Performed by: INTERNAL MEDICINE

## 2020-12-07 PROCEDURE — 1126F PR PAIN SEVERITY QUANTIFIED, NO PAIN PRESENT: ICD-10-PCS | Mod: HCNC,S$GLB,, | Performed by: INTERNAL MEDICINE

## 2020-12-07 PROCEDURE — 99213 PR OFFICE/OUTPT VISIT, EST, LEVL III, 20-29 MIN: ICD-10-PCS | Mod: HCNC,S$GLB,, | Performed by: INTERNAL MEDICINE

## 2020-12-07 PROCEDURE — 99499 RISK ADDL DX/OHS AUDIT: ICD-10-PCS | Mod: S$GLB,,, | Performed by: INTERNAL MEDICINE

## 2020-12-07 PROCEDURE — 3077F SYST BP >= 140 MM HG: CPT | Mod: HCNC,CPTII,S$GLB, | Performed by: INTERNAL MEDICINE

## 2020-12-07 PROCEDURE — 1159F PR MEDICATION LIST DOCUMENTED IN MEDICAL RECORD: ICD-10-PCS | Mod: HCNC,S$GLB,, | Performed by: INTERNAL MEDICINE

## 2020-12-07 PROCEDURE — 1159F MED LIST DOCD IN RCRD: CPT | Mod: HCNC,S$GLB,, | Performed by: INTERNAL MEDICINE

## 2020-12-07 PROCEDURE — 3288F PR FALLS RISK ASSESSMENT DOCUMENTED: ICD-10-PCS | Mod: HCNC,CPTII,S$GLB, | Performed by: INTERNAL MEDICINE

## 2020-12-07 PROCEDURE — 99213 OFFICE O/P EST LOW 20 MIN: CPT | Mod: HCNC,S$GLB,, | Performed by: INTERNAL MEDICINE

## 2020-12-07 PROCEDURE — 99499 UNLISTED E&M SERVICE: CPT | Mod: S$GLB,,, | Performed by: INTERNAL MEDICINE

## 2020-12-07 PROCEDURE — 3080F PR MOST RECENT DIASTOLIC BLOOD PRESSURE >= 90 MM HG: ICD-10-PCS | Mod: HCNC,CPTII,S$GLB, | Performed by: INTERNAL MEDICINE

## 2020-12-07 PROCEDURE — 3080F DIAST BP >= 90 MM HG: CPT | Mod: HCNC,CPTII,S$GLB, | Performed by: INTERNAL MEDICINE

## 2020-12-07 PROCEDURE — 3077F PR MOST RECENT SYSTOLIC BLOOD PRESSURE >= 140 MM HG: ICD-10-PCS | Mod: HCNC,CPTII,S$GLB, | Performed by: INTERNAL MEDICINE

## 2020-12-07 PROCEDURE — 99999 PR PBB SHADOW E&M-EST. PATIENT-LVL IV: ICD-10-PCS | Mod: PBBFAC,HCNC,, | Performed by: INTERNAL MEDICINE

## 2020-12-07 PROCEDURE — 1126F AMNT PAIN NOTED NONE PRSNT: CPT | Mod: HCNC,S$GLB,, | Performed by: INTERNAL MEDICINE

## 2020-12-07 PROCEDURE — 3288F FALL RISK ASSESSMENT DOCD: CPT | Mod: HCNC,CPTII,S$GLB, | Performed by: INTERNAL MEDICINE

## 2020-12-07 PROCEDURE — 1101F PT FALLS ASSESS-DOCD LE1/YR: CPT | Mod: HCNC,CPTII,S$GLB, | Performed by: INTERNAL MEDICINE

## 2020-12-07 PROCEDURE — 1101F PR PT FALLS ASSESS DOC 0-1 FALLS W/OUT INJ PAST YR: ICD-10-PCS | Mod: HCNC,CPTII,S$GLB, | Performed by: INTERNAL MEDICINE

## 2020-12-07 PROCEDURE — 3008F PR BODY MASS INDEX (BMI) DOCUMENTED: ICD-10-PCS | Mod: HCNC,CPTII,S$GLB, | Performed by: INTERNAL MEDICINE

## 2020-12-07 NOTE — LETTER
December 7, 2020      Stepan Pierre MD  [address] [place] [address]           [facility]  [address]  [place] [address]  Phone: [phone]  Fax: [phone]          Patient: David Amaro   MR Number: [id number]   YOB: 1945   Date of Visit: 12/7/2020       Dear Dr. Stepan Pierre:    Thank you for referring David Amaro to me for evaluation. Attached you will find relevant portions of my assessment and plan of care.    If you have questions, please do not hesitate to call me. I look forward to following David Amaro along with you.    Sincerely,    Srinivas Payton MD    Enclosure  CC:  No Recipients    If you would like to receive this communication electronically, please contact [email or web address] or [phone] to request more information on [employer] access.    For providers and/or their staff who would like to refer a patient to [facility], please contact us through our one-stop-shop provider referral line, [facility], at [phone].    If you feel you have received this communication in error or would no longer like to receive these types of communications, please e-mail [email or web address]

## 2020-12-07 NOTE — PROGRESS NOTES
Subjective:       Patient ID: David Amaro is a 75 y.o. Black or  male who presents for new evaluation of No chief complaint on file.    Hypertension  Pertinent negatives include no chest pain, headaches, neck pain, palpitations or shortness of breath.        Patient is a 73-year-old male with history of rising creatinine recently from a baseline of 1.1 up to 1.8.  Patient has had left elbow arthritis status post rheumatology evaluation.  Patient had been taking Advil twice a day for inflammation of the left elbow.  He used Advil for about a month.  He also has been taking Zestril 2.5 mg for blood pressure control.  Patient has been working in the [employer] and has had episodes of dehydration with hypotension.    May 2018 seen for acute kidney injury:  Stop all nonsteroidals.  Oral hydration.  Stop Zestril.    Patient had emergency room with it in bed and [facility] on 5/25/2018 patient had presented with abdominal pain nausea vomiting abdominal pain and had a CT scan of the abdomen showing Right inguinal hernia containing mostly fat, and a small portion of the cecal tip. Diverticulosis coli. Right kidney: No calculi. No hydronephrosis. Left kidney: Left-sided hydronephrosis and hydroureter, secondary to a 2 mm calculus in the proximal third of the left ureter, axial image 65, coronal image 45. Left renal edema and perinephric fat stranding. No intrarenal calculi. 1 cm cortical cyst present in the posterior lower pole region. Urinary bladder appears unremarkable. Radiation seed implants in the prostate bed region.       June 2018 patient has improved on his creatinine with the creatinine down to 1.5.  No heavy proteinuria. Pending follow up with dr. Da Velez for stone and Prostate ca. Has passed the stone.  Added Cardura for blood pressure control which needed prior authorization    August 2018 comes back for follow-up kidney function stable.  Vitamin-D deficiency.  Weekly  "ergocalciferol started    January 2019 creatinine is stable.  GFR 65% chronic kidney disease stage 2 stable overall.    December 7, 2020:  Renal function has improved with creatinine at 1.2. He denies any current complaints.         Review of Systems   Constitutional: Negative.  Negative for activity change, appetite change, chills, diaphoresis, fatigue and fever.   HENT: Negative.  Negative for congestion and trouble swallowing.    Eyes: Negative.    Respiratory: Negative.  Negative for cough, chest tightness, shortness of breath and wheezing.    Cardiovascular: Negative.  Negative for chest pain, palpitations and leg swelling.   Gastrointestinal: Negative.  Negative for abdominal distention, abdominal pain, nausea and vomiting.   Genitourinary: Negative.  Negative for decreased urine volume, difficulty urinating, dysuria, enuresis, flank pain, frequency, hematuria, penile swelling, scrotal swelling and urgency.   Musculoskeletal: Negative.  Negative for arthralgias, back pain, joint swelling and neck pain.   Skin: Negative for rash.   Neurological: Negative.  Negative for tremors, seizures and headaches.   Psychiatric/Behavioral: Negative.  Negative for confusion and sleep disturbance. The patient is not nervous/anxious.    All other systems reviewed and are negative.      Objective:       BP (!) 146/90   Pulse 60   Resp 20   Ht 5' 9" (1.753 m)   Wt 77 kg (169 lb 12.1 oz)   BMI 25.07 kg/m²     Physical Exam  Constitutional:       General: He is not in acute distress.     Appearance: He is well-developed.   HENT:      Head: Normocephalic.   Eyes:      Pupils: Pupils are equal, round, and reactive to light.   Neck:      Musculoskeletal: Normal range of motion and neck supple.      Thyroid: No thyromegaly.      Vascular: No JVD.   Cardiovascular:      Rate and Rhythm: Normal rate and regular rhythm.      Chest Wall: PMI is not displaced.      Heart sounds: Normal heart sounds, S1 normal and S2 normal. No murmur. " No friction rub. No gallop.    Pulmonary:      Effort: Pulmonary effort is normal. No respiratory distress.      Breath sounds: Normal breath sounds. No wheezing or rales.   Chest:      Chest wall: No tenderness.   Abdominal:      General: There is no distension.      Palpations: There is no mass.      Tenderness: There is no abdominal tenderness. There is no rebound.      Hernia: No hernia is present.   Musculoskeletal: Normal range of motion.         General: No tenderness.   Lymphadenopathy:      Cervical: No cervical adenopathy.   Skin:     General: Skin is warm and dry.      Findings: No erythema or rash.   Neurological:      Mental Status: He is alert and oriented to person, place, and time. He is not disoriented.      Cranial Nerves: No cranial nerve deficit.      Motor: No abnormal muscle tone.      Coordination: Coordination normal.      Deep Tendon Reflexes: Reflexes are normal and symmetric. Reflexes normal.   Psychiatric:         Behavior: Behavior normal.         Thought Content: Thought content normal.         Judgment: Judgment normal.           Renal US 6/25/18:    The bilateral kidneys are normal in size, echotexture, and cortical thickness. No evidence of hydronephrosis, solid mass, or calculus. No perinephric fluid collections.    The right kidney measures 10.8  cm in length.    The left kidney measures small calyx dilatation unchanged from 6/2018     The urinary bladder is within normal limits.    Impression: Normal renal ultrasound on right without evidence of hydronephrosis.  Left-sided echogenic kidney unchanged    Lab Results   Component Value Date    CREATININE 1.2 12/04/2020    BUN 20 12/04/2020     12/04/2020    K 4.1 12/04/2020     (H) 12/04/2020    CO2 23 12/04/2020     Lab Results   Component Value Date    WBC 5.94 09/14/2020    HGB 11.7 (L) 09/14/2020    HCT 37.6 (L) 09/14/2020    MCV 97 09/14/2020     09/14/2020     Lab Results   Component Value Date    PTH 64.9  12/04/2020    CALCIUM 8.8 12/04/2020    PHOS 2.7 12/04/2020           Right kidney         Left kidney no change   Assessment:    )    1. Stage 2 chronic kidney disease        Plan:           1.  Chronic kidney disease stage stage 2/3: Renal function has improved since last visit with creatinine declining to 1.2 (from 1.5). No signs of hydronephrosis.  Patient was advised to hydrate with 60-80 ounces of water per day and to avoid NSAID pain medications such as advil, aleve, motrin, ibuprofen, naprosyn, meloxicam, diclofenac, mobic.     2. Electrolytes: at goal.     3.  Essential hypertension:  He reports home SBP in 120s. Continue Cardura.     4.  BPH: continue Flomax    5. Meds: reviewed.     6. Nephrolithiasis: no additional episodes.       Follow up 1 year

## 2021-03-15 ENCOUNTER — OFFICE VISIT (OUTPATIENT)
Dept: FAMILY MEDICINE | Facility: CLINIC | Age: 76
End: 2021-03-15
Payer: MEDICARE

## 2021-03-15 ENCOUNTER — LAB VISIT (OUTPATIENT)
Dept: LAB | Facility: HOSPITAL | Age: 76
End: 2021-03-15
Attending: INTERNAL MEDICINE
Payer: MEDICARE

## 2021-03-15 VITALS
HEART RATE: 85 BPM | WEIGHT: 172.94 LBS | BODY MASS INDEX: 25.61 KG/M2 | SYSTOLIC BLOOD PRESSURE: 128 MMHG | OXYGEN SATURATION: 96 % | DIASTOLIC BLOOD PRESSURE: 70 MMHG | TEMPERATURE: 98 F | HEIGHT: 69 IN

## 2021-03-15 DIAGNOSIS — E78.5 DYSLIPIDEMIA: ICD-10-CM

## 2021-03-15 DIAGNOSIS — N40.0 BENIGN PROSTATIC HYPERPLASIA, UNSPECIFIED WHETHER LOWER URINARY TRACT SYMPTOMS PRESENT: ICD-10-CM

## 2021-03-15 DIAGNOSIS — I10 ESSENTIAL HYPERTENSION: ICD-10-CM

## 2021-03-15 DIAGNOSIS — E03.9 HYPOTHYROIDISM, UNSPECIFIED TYPE: ICD-10-CM

## 2021-03-15 DIAGNOSIS — E03.9 HYPOTHYROIDISM, UNSPECIFIED TYPE: Primary | ICD-10-CM

## 2021-03-15 PROCEDURE — 36415 COLL VENOUS BLD VENIPUNCTURE: CPT | Mod: PO | Performed by: INTERNAL MEDICINE

## 2021-03-15 PROCEDURE — 99499 RISK ADDL DX/OHS AUDIT: ICD-10-PCS | Mod: HCNC,S$GLB,, | Performed by: INTERNAL MEDICINE

## 2021-03-15 PROCEDURE — 99214 PR OFFICE/OUTPT VISIT, EST, LEVL IV, 30-39 MIN: ICD-10-PCS | Mod: S$GLB,,, | Performed by: INTERNAL MEDICINE

## 2021-03-15 PROCEDURE — 1101F PR PT FALLS ASSESS DOC 0-1 FALLS W/OUT INJ PAST YR: ICD-10-PCS | Mod: CPTII,S$GLB,, | Performed by: INTERNAL MEDICINE

## 2021-03-15 PROCEDURE — 99999 PR PBB SHADOW E&M-EST. PATIENT-LVL III: CPT | Mod: PBBFAC,,, | Performed by: INTERNAL MEDICINE

## 2021-03-15 PROCEDURE — 3078F DIAST BP <80 MM HG: CPT | Mod: CPTII,S$GLB,, | Performed by: INTERNAL MEDICINE

## 2021-03-15 PROCEDURE — 3288F FALL RISK ASSESSMENT DOCD: CPT | Mod: CPTII,S$GLB,, | Performed by: INTERNAL MEDICINE

## 2021-03-15 PROCEDURE — 1101F PT FALLS ASSESS-DOCD LE1/YR: CPT | Mod: CPTII,S$GLB,, | Performed by: INTERNAL MEDICINE

## 2021-03-15 PROCEDURE — 3288F PR FALLS RISK ASSESSMENT DOCUMENTED: ICD-10-PCS | Mod: CPTII,S$GLB,, | Performed by: INTERNAL MEDICINE

## 2021-03-15 PROCEDURE — 3008F PR BODY MASS INDEX (BMI) DOCUMENTED: ICD-10-PCS | Mod: CPTII,S$GLB,, | Performed by: INTERNAL MEDICINE

## 2021-03-15 PROCEDURE — 1159F MED LIST DOCD IN RCRD: CPT | Mod: S$GLB,,, | Performed by: INTERNAL MEDICINE

## 2021-03-15 PROCEDURE — 1126F AMNT PAIN NOTED NONE PRSNT: CPT | Mod: S$GLB,,, | Performed by: INTERNAL MEDICINE

## 2021-03-15 PROCEDURE — 84443 ASSAY THYROID STIM HORMONE: CPT | Performed by: INTERNAL MEDICINE

## 2021-03-15 PROCEDURE — 3074F SYST BP LT 130 MM HG: CPT | Mod: CPTII,S$GLB,, | Performed by: INTERNAL MEDICINE

## 2021-03-15 PROCEDURE — 3078F PR MOST RECENT DIASTOLIC BLOOD PRESSURE < 80 MM HG: ICD-10-PCS | Mod: CPTII,S$GLB,, | Performed by: INTERNAL MEDICINE

## 2021-03-15 PROCEDURE — 1126F PR PAIN SEVERITY QUANTIFIED, NO PAIN PRESENT: ICD-10-PCS | Mod: S$GLB,,, | Performed by: INTERNAL MEDICINE

## 2021-03-15 PROCEDURE — 99499 UNLISTED E&M SERVICE: CPT | Mod: HCNC,S$GLB,, | Performed by: INTERNAL MEDICINE

## 2021-03-15 PROCEDURE — 1159F PR MEDICATION LIST DOCUMENTED IN MEDICAL RECORD: ICD-10-PCS | Mod: S$GLB,,, | Performed by: INTERNAL MEDICINE

## 2021-03-15 PROCEDURE — 99214 OFFICE O/P EST MOD 30 MIN: CPT | Mod: S$GLB,,, | Performed by: INTERNAL MEDICINE

## 2021-03-15 PROCEDURE — 99999 PR PBB SHADOW E&M-EST. PATIENT-LVL III: ICD-10-PCS | Mod: PBBFAC,,, | Performed by: INTERNAL MEDICINE

## 2021-03-15 PROCEDURE — 3074F PR MOST RECENT SYSTOLIC BLOOD PRESSURE < 130 MM HG: ICD-10-PCS | Mod: CPTII,S$GLB,, | Performed by: INTERNAL MEDICINE

## 2021-03-15 PROCEDURE — 3008F BODY MASS INDEX DOCD: CPT | Mod: CPTII,S$GLB,, | Performed by: INTERNAL MEDICINE

## 2021-03-16 LAB — TSH SERPL DL<=0.005 MIU/L-ACNC: 1.95 UIU/ML (ref 0.4–4)

## 2021-04-09 ENCOUNTER — PES CALL (OUTPATIENT)
Dept: ADMINISTRATIVE | Facility: CLINIC | Age: 76
End: 2021-04-09

## 2021-04-14 ENCOUNTER — OFFICE VISIT (OUTPATIENT)
Dept: FAMILY MEDICINE | Facility: CLINIC | Age: 76
End: 2021-04-14
Payer: MEDICARE

## 2021-04-14 VITALS
HEIGHT: 69 IN | RESPIRATION RATE: 20 BRPM | TEMPERATURE: 98 F | BODY MASS INDEX: 25.61 KG/M2 | WEIGHT: 172.94 LBS | DIASTOLIC BLOOD PRESSURE: 82 MMHG | SYSTOLIC BLOOD PRESSURE: 138 MMHG | HEART RATE: 84 BPM

## 2021-04-14 DIAGNOSIS — Z00.00 ENCOUNTER FOR PREVENTIVE HEALTH EXAMINATION: Primary | ICD-10-CM

## 2021-04-14 DIAGNOSIS — I10 ESSENTIAL HYPERTENSION: ICD-10-CM

## 2021-04-14 DIAGNOSIS — Z86.010 HISTORY OF COLON POLYPS: ICD-10-CM

## 2021-04-14 DIAGNOSIS — E23.6 EMPTY SELLA: ICD-10-CM

## 2021-04-14 DIAGNOSIS — E03.9 ACQUIRED HYPOTHYROIDISM: ICD-10-CM

## 2021-04-14 DIAGNOSIS — E78.5 DYSLIPIDEMIA: ICD-10-CM

## 2021-04-14 DIAGNOSIS — C61 MALIGNANT NEOPLASM OF PROSTATE: ICD-10-CM

## 2021-04-14 DIAGNOSIS — N25.81 HYPERPARATHYROIDISM, SECONDARY RENAL: ICD-10-CM

## 2021-04-14 DIAGNOSIS — N40.0 BENIGN PROSTATIC HYPERPLASIA WITHOUT LOWER URINARY TRACT SYMPTOMS: ICD-10-CM

## 2021-04-14 DIAGNOSIS — Z23 NEED FOR 23-POLYVALENT PNEUMOCOCCAL POLYSACCHARIDE VACCINE: ICD-10-CM

## 2021-04-14 DIAGNOSIS — N18.31 STAGE 3A CHRONIC KIDNEY DISEASE: ICD-10-CM

## 2021-04-14 PROCEDURE — 3288F PR FALLS RISK ASSESSMENT DOCUMENTED: ICD-10-PCS | Mod: HCNC,CPTII,S$GLB, | Performed by: NURSE PRACTITIONER

## 2021-04-14 PROCEDURE — 90732 PNEUMOCOCCAL POLYSACCHARIDE VACCINE 23-VALENT =>2YO SQ IM: ICD-10-PCS | Mod: HCNC,S$GLB,, | Performed by: NURSE PRACTITIONER

## 2021-04-14 PROCEDURE — G0439 PPPS, SUBSEQ VISIT: HCPCS | Mod: HCNC,S$GLB,, | Performed by: NURSE PRACTITIONER

## 2021-04-14 PROCEDURE — 1101F PT FALLS ASSESS-DOCD LE1/YR: CPT | Mod: HCNC,CPTII,S$GLB, | Performed by: NURSE PRACTITIONER

## 2021-04-14 PROCEDURE — 90732 PPSV23 VACC 2 YRS+ SUBQ/IM: CPT | Mod: HCNC,S$GLB,, | Performed by: NURSE PRACTITIONER

## 2021-04-14 PROCEDURE — 99499 RISK ADDL DX/OHS AUDIT: ICD-10-PCS | Mod: HCNC,S$GLB,, | Performed by: NURSE PRACTITIONER

## 2021-04-14 PROCEDURE — 1126F PR PAIN SEVERITY QUANTIFIED, NO PAIN PRESENT: ICD-10-PCS | Mod: HCNC,S$GLB,, | Performed by: NURSE PRACTITIONER

## 2021-04-14 PROCEDURE — 1126F AMNT PAIN NOTED NONE PRSNT: CPT | Mod: HCNC,S$GLB,, | Performed by: NURSE PRACTITIONER

## 2021-04-14 PROCEDURE — 3288F FALL RISK ASSESSMENT DOCD: CPT | Mod: HCNC,CPTII,S$GLB, | Performed by: NURSE PRACTITIONER

## 2021-04-14 PROCEDURE — 1158F PR ADVANCE CARE PLANNING DISCUSS DOCUMENTED IN MEDICAL RECORD: ICD-10-PCS | Mod: HCNC,S$GLB,, | Performed by: NURSE PRACTITIONER

## 2021-04-14 PROCEDURE — G0439 PR MEDICARE ANNUAL WELLNESS SUBSEQUENT VISIT: ICD-10-PCS | Mod: HCNC,S$GLB,, | Performed by: NURSE PRACTITIONER

## 2021-04-14 PROCEDURE — G0009 ADMIN PNEUMOCOCCAL VACCINE: HCPCS | Mod: HCNC,S$GLB,, | Performed by: NURSE PRACTITIONER

## 2021-04-14 PROCEDURE — 99999 PR PBB SHADOW E&M-EST. PATIENT-LVL IV: ICD-10-PCS | Mod: PBBFAC,HCNC,, | Performed by: NURSE PRACTITIONER

## 2021-04-14 PROCEDURE — G0009 PNEUMOCOCCAL POLYSACCHARIDE VACCINE 23-VALENT =>2YO SQ IM: ICD-10-PCS | Mod: HCNC,S$GLB,, | Performed by: NURSE PRACTITIONER

## 2021-04-14 PROCEDURE — 3008F PR BODY MASS INDEX (BMI) DOCUMENTED: ICD-10-PCS | Mod: HCNC,CPTII,S$GLB, | Performed by: NURSE PRACTITIONER

## 2021-04-14 PROCEDURE — 1101F PR PT FALLS ASSESS DOC 0-1 FALLS W/OUT INJ PAST YR: ICD-10-PCS | Mod: HCNC,CPTII,S$GLB, | Performed by: NURSE PRACTITIONER

## 2021-04-14 PROCEDURE — 1158F ADVNC CARE PLAN TLK DOCD: CPT | Mod: HCNC,S$GLB,, | Performed by: NURSE PRACTITIONER

## 2021-04-14 PROCEDURE — 99499 UNLISTED E&M SERVICE: CPT | Mod: HCNC,S$GLB,, | Performed by: NURSE PRACTITIONER

## 2021-04-14 PROCEDURE — 99999 PR PBB SHADOW E&M-EST. PATIENT-LVL IV: CPT | Mod: PBBFAC,HCNC,, | Performed by: NURSE PRACTITIONER

## 2021-04-14 PROCEDURE — 3008F BODY MASS INDEX DOCD: CPT | Mod: HCNC,CPTII,S$GLB, | Performed by: NURSE PRACTITIONER

## 2021-04-28 ENCOUNTER — PATIENT MESSAGE (OUTPATIENT)
Dept: RESEARCH | Facility: HOSPITAL | Age: 76
End: 2021-04-28

## 2021-04-30 ENCOUNTER — PATIENT MESSAGE (OUTPATIENT)
Dept: FAMILY MEDICINE | Facility: CLINIC | Age: 76
End: 2021-04-30

## 2021-06-07 RX ORDER — ERGOCALCIFEROL 1.25 MG/1
50000 CAPSULE ORAL
Qty: 12 CAPSULE | Refills: 3 | Status: SHIPPED | OUTPATIENT
Start: 2021-06-07 | End: 2022-06-08

## 2021-06-25 RX ORDER — DOXAZOSIN 4 MG/1
4 TABLET ORAL NIGHTLY
Qty: 90 TABLET | Refills: 3 | Status: SHIPPED | OUTPATIENT
Start: 2021-06-25 | End: 2022-06-16

## 2021-08-05 ENCOUNTER — TELEPHONE (OUTPATIENT)
Dept: FAMILY MEDICINE | Facility: CLINIC | Age: 76
End: 2021-08-05

## 2021-08-05 ENCOUNTER — OFFICE VISIT (OUTPATIENT)
Dept: FAMILY MEDICINE | Facility: CLINIC | Age: 76
End: 2021-08-05
Payer: MEDICARE

## 2021-08-05 DIAGNOSIS — M54.41 ACUTE BILATERAL LOW BACK PAIN WITH RIGHT-SIDED SCIATICA: Primary | ICD-10-CM

## 2021-08-05 DIAGNOSIS — M79.18 BILATERAL BUTTOCK PAIN: ICD-10-CM

## 2021-08-05 PROCEDURE — 99442 PR PHYSICIAN TELEPHONE EVALUATION 11-20 MIN: CPT | Mod: HCNC,95,, | Performed by: INTERNAL MEDICINE

## 2021-08-05 PROCEDURE — 99442 PR PHYSICIAN TELEPHONE EVALUATION 11-20 MIN: ICD-10-PCS | Mod: HCNC,95,, | Performed by: INTERNAL MEDICINE

## 2021-08-05 RX ORDER — GABAPENTIN 100 MG/1
100 CAPSULE ORAL NIGHTLY
Qty: 30 CAPSULE | Refills: 1 | Status: SHIPPED | OUTPATIENT
Start: 2021-08-05 | End: 2021-09-02 | Stop reason: SDUPTHER

## 2021-08-27 NOTE — PROGRESS NOTES
"David Amaro presented for a  Medicare AWV and comprehensive Health Risk Assessment today. The following components were reviewed and updated:    · Medical history  · Family History  · Social history  · Allergies and Current Medications  · Health Risk Assessment  · Health Maintenance  · Care Team     ** See Completed Assessments for Annual Wellness Visit within the encounter summary.**       The following assessments were completed:  · Living Situation  · CAGE  · Depression Screening  · Timed Get Up and Go  · Whisper Test  · Cognitive Function Screening  · Nutrition Screening  · ADL Screening  · PAQ Screening    Vitals:    06/09/20 1158   BP: 124/80   Pulse: 60   Resp: 18   Temp: 98.6 °F (37 °C)   SpO2: 98%   Weight: 77.7 kg (171 lb 3 oz)   Height: 5' 9" (1.753 m)     Body mass index is 25.28 kg/m².  Physical Exam   Constitutional: He appears well-developed. No distress.   HENT:   Head: Normocephalic and atraumatic.   Eyes: Pupils are equal, round, and reactive to light.   Neck: Carotid bruit is not present.   Cardiovascular: Normal rate, regular rhythm, normal heart sounds, intact distal pulses and normal pulses. Exam reveals no gallop.   No murmur heard.  Pulmonary/Chest: Effort normal and breath sounds normal.   Abdominal: Soft. Bowel sounds are normal. He exhibits no distension. There is no tenderness.   Musculoskeletal: Normal range of motion. He exhibits no edema.   Neurological: He exhibits normal muscle tone.   Skin: Skin is warm, dry and intact.   Psychiatric: He has a normal mood and affect. His speech is normal and behavior is normal. Judgment and thought content normal. Cognition and memory are normal.   Nursing note and vitals reviewed.        Diagnoses and health risks identified today and associated recommendations/orders:    1. Encounter for preventive health examination    2. Essential hypertension  Chronic and Stable on Cardura. Continue current treatment plan as previously prescribed with your " PCP an nephrologist    3. Dyslipidemia  Component      Latest Ref Rng & Units 5/15/2019   Cholesterol      120 - 199 mg/dL 196   Triglycerides      30 - 150 mg/dL 40   HDL      40 - 75 mg/dL 77 (H)   LDL Cholesterol External      63.0 - 159.0 mg/dL 111.0   Hdl/Cholesterol Ratio      20.0 - 50.0 % 39.3   Total Cholesterol/HDL Ratio      2.0 - 5.0 2.5   Non-HDL Cholesterol      mg/dL 119   Chronic and Stable. Continue current treatment plan as previously prescribed with your nephrologist and urologist      4. Hypothyroidism, unspecified type  Chronic and Stable on Synthriod  Continue current treatment plan as previously prescribed with your    5. Benign prostatic hyperplasia, unspecified whether lower urinary tract symptoms present  Chronic and Stable. S/P prostate seed inplant in 2009 Continue current treatment plan as previously prescribed with your urologist     6. Osteoarthritis of right hip, unspecified osteoarthritis type  Chronic and Stable. Continue current treatment plan as previously prescribed with your PCP    7. Prostate cancer  Chronic and Stable. S/P prostate seed inplant in 2009   Continue current treatment plan as previously prescribed with your urologist     8. History of colon polyps  Stable. Done 4/ 2017 - Due for a repeat in  5 years. Followed by PCP and gastroneterologist    9. Hyperparathyroidism, secondary renal  Chronic and Stable. Continue current treatment plan as previously prescribed with your PCP    10. Stage 3 chronic kidney disease  Component      Latest Ref Rng & Units 5/15/2019   Creatinine      0.5 - 1.4 mg/dL 1.5 (H)   Chronic and Stable. Continue current treatment plan as previously prescribed with your PCP    11 Empty sella  Noted per  MRT  2011...INCIDENTAL NOTE MADE OF HIGH RIDING RIGHT JUGULAR BULB AND PARTIALLY   EMPTY SELLA  Chronic and Stable. Followed by PCP     Christopher Hernandez with a 5-10 year written screening schedule and personal prevention plan. Recommendations were  developed using the USPSTF age appropriate recommendations. Education, counseling, and referrals were provided as needed. After Visit Summary printed and given to patient which includes a list of additional screenings\tests needed.    I offered to discuss end of life issues, including information on how to make advance directives that the patient could use to name someone who would make medical decisions on their behalf if they became too ill to make themselves.  _X_Patient is interested, I provided paper work and offered to discuss    Follow up in about 1 year (around 6/9/2021), or annual wellness.    Larisa Bruner NP        O-L Flap Text: The defect edges were debeveled with a #15 scalpel blade.  Given the location of the defect, shape of the defect and the proximity to free margins an O-L flap was deemed most appropriate.  Using a sterile surgical marker, an appropriate advancement flap was drawn incorporating the defect and placing the expected incisions within the relaxed skin tension lines where possible.    The area thus outlined was incised deep to adipose tissue with a #15 scalpel blade.  The skin margins were undermined to an appropriate distance in all directions utilizing iris scissors.

## 2021-09-02 ENCOUNTER — TELEPHONE (OUTPATIENT)
Dept: FAMILY MEDICINE | Facility: CLINIC | Age: 76
End: 2021-09-02

## 2021-09-02 ENCOUNTER — OFFICE VISIT (OUTPATIENT)
Dept: FAMILY MEDICINE | Facility: CLINIC | Age: 76
End: 2021-09-02
Payer: MEDICARE

## 2021-09-02 ENCOUNTER — PATIENT MESSAGE (OUTPATIENT)
Dept: FAMILY MEDICINE | Facility: CLINIC | Age: 76
End: 2021-09-02

## 2021-09-02 DIAGNOSIS — M54.50 ACUTE BILATERAL LOW BACK PAIN WITHOUT SCIATICA: ICD-10-CM

## 2021-09-02 PROCEDURE — 99213 PR OFFICE/OUTPT VISIT, EST, LEVL III, 20-29 MIN: ICD-10-PCS | Mod: HCNC,95,, | Performed by: INTERNAL MEDICINE

## 2021-09-02 PROCEDURE — 99213 OFFICE O/P EST LOW 20 MIN: CPT | Mod: HCNC,95,, | Performed by: INTERNAL MEDICINE

## 2021-09-02 RX ORDER — GABAPENTIN 100 MG/1
100 CAPSULE ORAL NIGHTLY
Qty: 30 CAPSULE | Refills: 1 | Status: SHIPPED | OUTPATIENT
Start: 2021-09-02 | End: 2023-09-25

## 2021-09-15 ENCOUNTER — OFFICE VISIT (OUTPATIENT)
Dept: FAMILY MEDICINE | Facility: CLINIC | Age: 76
End: 2021-09-15
Payer: MEDICARE

## 2021-09-15 ENCOUNTER — HOSPITAL ENCOUNTER (OUTPATIENT)
Dept: RADIOLOGY | Facility: HOSPITAL | Age: 76
Discharge: HOME | End: 2021-09-15
Attending: INTERNAL MEDICINE
Payer: MEDICARE

## 2021-09-15 VITALS
RESPIRATION RATE: 16 BRPM | TEMPERATURE: 98 F | WEIGHT: 167.31 LBS | HEART RATE: 57 BPM | DIASTOLIC BLOOD PRESSURE: 72 MMHG | BODY MASS INDEX: 24.71 KG/M2 | SYSTOLIC BLOOD PRESSURE: 128 MMHG | OXYGEN SATURATION: 97 %

## 2021-09-15 DIAGNOSIS — E03.9 ACQUIRED HYPOTHYROIDISM: ICD-10-CM

## 2021-09-15 DIAGNOSIS — I10 ESSENTIAL HYPERTENSION: ICD-10-CM

## 2021-09-15 DIAGNOSIS — E78.5 DYSLIPIDEMIA: ICD-10-CM

## 2021-09-15 DIAGNOSIS — N40.0 BENIGN PROSTATIC HYPERPLASIA WITHOUT LOWER URINARY TRACT SYMPTOMS: Primary | ICD-10-CM

## 2021-09-15 DIAGNOSIS — M54.50 ACUTE BILATERAL LOW BACK PAIN WITHOUT SCIATICA: ICD-10-CM

## 2021-09-15 PROCEDURE — 1126F AMNT PAIN NOTED NONE PRSNT: CPT | Mod: HCNC,CPTII,S$GLB, | Performed by: INTERNAL MEDICINE

## 2021-09-15 PROCEDURE — 72100 X-RAY EXAM L-S SPINE 2/3 VWS: CPT | Mod: 26,HCNC,, | Performed by: RADIOLOGY

## 2021-09-15 PROCEDURE — 99214 PR OFFICE/OUTPT VISIT, EST, LEVL IV, 30-39 MIN: ICD-10-PCS | Mod: HCNC,S$GLB,, | Performed by: INTERNAL MEDICINE

## 2021-09-15 PROCEDURE — 99999 PR PBB SHADOW E&M-EST. PATIENT-LVL IV: ICD-10-PCS | Mod: PBBFAC,HCNC,, | Performed by: INTERNAL MEDICINE

## 2021-09-15 PROCEDURE — 3078F DIAST BP <80 MM HG: CPT | Mod: HCNC,CPTII,S$GLB, | Performed by: INTERNAL MEDICINE

## 2021-09-15 PROCEDURE — 72100 X-RAY EXAM L-S SPINE 2/3 VWS: CPT | Mod: TC,HCNC,FY,PO

## 2021-09-15 PROCEDURE — 99999 PR PBB SHADOW E&M-EST. PATIENT-LVL IV: CPT | Mod: PBBFAC,HCNC,, | Performed by: INTERNAL MEDICINE

## 2021-09-15 PROCEDURE — 3078F PR MOST RECENT DIASTOLIC BLOOD PRESSURE < 80 MM HG: ICD-10-PCS | Mod: HCNC,CPTII,S$GLB, | Performed by: INTERNAL MEDICINE

## 2021-09-15 PROCEDURE — 1126F PR PAIN SEVERITY QUANTIFIED, NO PAIN PRESENT: ICD-10-PCS | Mod: HCNC,CPTII,S$GLB, | Performed by: INTERNAL MEDICINE

## 2021-09-15 PROCEDURE — 3074F PR MOST RECENT SYSTOLIC BLOOD PRESSURE < 130 MM HG: ICD-10-PCS | Mod: HCNC,CPTII,S$GLB, | Performed by: INTERNAL MEDICINE

## 2021-09-15 PROCEDURE — 1101F PT FALLS ASSESS-DOCD LE1/YR: CPT | Mod: HCNC,CPTII,S$GLB, | Performed by: INTERNAL MEDICINE

## 2021-09-15 PROCEDURE — 99214 OFFICE O/P EST MOD 30 MIN: CPT | Mod: HCNC,S$GLB,, | Performed by: INTERNAL MEDICINE

## 2021-09-15 PROCEDURE — 72100 XR LUMBAR SPINE AP AND LATERAL: ICD-10-PCS | Mod: 26,HCNC,, | Performed by: RADIOLOGY

## 2021-09-15 PROCEDURE — 3074F SYST BP LT 130 MM HG: CPT | Mod: HCNC,CPTII,S$GLB, | Performed by: INTERNAL MEDICINE

## 2021-09-15 PROCEDURE — 1159F PR MEDICATION LIST DOCUMENTED IN MEDICAL RECORD: ICD-10-PCS | Mod: HCNC,CPTII,S$GLB, | Performed by: INTERNAL MEDICINE

## 2021-09-15 PROCEDURE — 1159F MED LIST DOCD IN RCRD: CPT | Mod: HCNC,CPTII,S$GLB, | Performed by: INTERNAL MEDICINE

## 2021-09-15 PROCEDURE — 3288F PR FALLS RISK ASSESSMENT DOCUMENTED: ICD-10-PCS | Mod: HCNC,CPTII,S$GLB, | Performed by: INTERNAL MEDICINE

## 2021-09-15 PROCEDURE — 1101F PR PT FALLS ASSESS DOC 0-1 FALLS W/OUT INJ PAST YR: ICD-10-PCS | Mod: HCNC,CPTII,S$GLB, | Performed by: INTERNAL MEDICINE

## 2021-09-15 PROCEDURE — 3288F FALL RISK ASSESSMENT DOCD: CPT | Mod: HCNC,CPTII,S$GLB, | Performed by: INTERNAL MEDICINE

## 2021-09-15 RX ORDER — PNEUMOCOCCAL VACCINE POLYVALENT 25; 25; 25; 25; 25; 25; 25; 25; 25; 25; 25; 25; 25; 25; 25; 25; 25; 25; 25; 25; 25; 25; 25 UG/.5ML; UG/.5ML; UG/.5ML; UG/.5ML; UG/.5ML; UG/.5ML; UG/.5ML; UG/.5ML; UG/.5ML; UG/.5ML; UG/.5ML; UG/.5ML; UG/.5ML; UG/.5ML; UG/.5ML; UG/.5ML; UG/.5ML; UG/.5ML; UG/.5ML; UG/.5ML; UG/.5ML; UG/.5ML; UG/.5ML
INJECTION, SOLUTION INTRAMUSCULAR; SUBCUTANEOUS
COMMUNITY
Start: 2021-04-14 | End: 2023-09-25

## 2021-09-20 ENCOUNTER — TELEPHONE (OUTPATIENT)
Dept: PAIN MEDICINE | Facility: CLINIC | Age: 76
End: 2021-09-20

## 2021-09-22 ENCOUNTER — TELEPHONE (OUTPATIENT)
Dept: PAIN MEDICINE | Facility: CLINIC | Age: 76
End: 2021-09-22

## 2022-01-05 DIAGNOSIS — N18.30 STAGE 3 CHRONIC KIDNEY DISEASE, UNSPECIFIED WHETHER STAGE 3A OR 3B CKD: Primary | ICD-10-CM

## 2022-01-10 ENCOUNTER — TELEPHONE (OUTPATIENT)
Dept: INTERNAL MEDICINE | Facility: CLINIC | Age: 77
End: 2022-01-10
Payer: MEDICARE

## 2022-01-10 NOTE — TELEPHONE ENCOUNTER
----- Message from Ayala Beck sent at 1/10/2022  1:59 PM CST -----  Patient would like a call back at  [phone] in regard to his upcoming appointment.    Thanks

## 2022-01-14 ENCOUNTER — LAB VISIT (OUTPATIENT)
Dept: LAB | Facility: HOSPITAL | Age: 77
End: 2022-01-14
Attending: INTERNAL MEDICINE
Payer: MEDICARE

## 2022-01-14 DIAGNOSIS — N18.30 STAGE 3 CHRONIC KIDNEY DISEASE, UNSPECIFIED WHETHER STAGE 3A OR 3B CKD: ICD-10-CM

## 2022-01-14 LAB
ALBUMIN SERPL BCP-MCNC: 3.5 G/DL (ref 3.5–5.2)
ANION GAP SERPL CALC-SCNC: 7 MMOL/L (ref 8–16)
BASOPHILS # BLD AUTO: 0.04 K/UL (ref 0–0.2)
BASOPHILS NFR BLD: 0.7 % (ref 0–1.9)
BUN SERPL-MCNC: 18 MG/DL (ref 8–23)
CALCIUM SERPL-MCNC: 8.9 MG/DL (ref 8.7–10.5)
CHLORIDE SERPL-SCNC: 105 MMOL/L (ref 95–110)
CO2 SERPL-SCNC: 26 MMOL/L (ref 23–29)
CREAT SERPL-MCNC: 1.3 MG/DL (ref 0.5–1.4)
DIFFERENTIAL METHOD: ABNORMAL
EOSINOPHIL # BLD AUTO: 0.1 K/UL (ref 0–0.5)
EOSINOPHIL NFR BLD: 0.9 % (ref 0–8)
ERYTHROCYTE [DISTWIDTH] IN BLOOD BY AUTOMATED COUNT: 12.6 % (ref 11.5–14.5)
EST. GFR  (AFRICAN AMERICAN): >60 ML/MIN/1.73 M^2
EST. GFR  (NON AFRICAN AMERICAN): 53.4 ML/MIN/1.73 M^2
GLUCOSE SERPL-MCNC: 103 MG/DL (ref 70–110)
HCT VFR BLD AUTO: 38.7 % (ref 40–54)
HGB BLD-MCNC: 12.4 G/DL (ref 14–18)
IMM GRANULOCYTES # BLD AUTO: 0.01 K/UL (ref 0–0.04)
IMM GRANULOCYTES NFR BLD AUTO: 0.2 % (ref 0–0.5)
LYMPHOCYTES # BLD AUTO: 0.9 K/UL (ref 1–4.8)
LYMPHOCYTES NFR BLD: 17.1 % (ref 18–48)
MCH RBC QN AUTO: 30.6 PG (ref 27–31)
MCHC RBC AUTO-ENTMCNC: 32 G/DL (ref 32–36)
MCV RBC AUTO: 96 FL (ref 82–98)
MONOCYTES # BLD AUTO: 0.6 K/UL (ref 0.3–1)
MONOCYTES NFR BLD: 11.2 % (ref 4–15)
NEUTROPHILS # BLD AUTO: 3.8 K/UL (ref 1.8–7.7)
NEUTROPHILS NFR BLD: 69.9 % (ref 38–73)
NRBC BLD-RTO: 0 /100 WBC
PHOSPHATE SERPL-MCNC: 2.6 MG/DL (ref 2.7–4.5)
PLATELET # BLD AUTO: 190 K/UL (ref 150–450)
PMV BLD AUTO: 10.2 FL (ref 9.2–12.9)
POTASSIUM SERPL-SCNC: 3.8 MMOL/L (ref 3.5–5.1)
RBC # BLD AUTO: 4.05 M/UL (ref 4.6–6.2)
SODIUM SERPL-SCNC: 138 MMOL/L (ref 136–145)
WBC # BLD AUTO: 5.37 K/UL (ref 3.9–12.7)

## 2022-01-14 PROCEDURE — 36415 COLL VENOUS BLD VENIPUNCTURE: CPT | Mod: HCNC | Performed by: INTERNAL MEDICINE

## 2022-01-14 PROCEDURE — 85025 COMPLETE CBC W/AUTO DIFF WBC: CPT | Mod: HCNC | Performed by: INTERNAL MEDICINE

## 2022-01-14 PROCEDURE — 80069 RENAL FUNCTION PANEL: CPT | Mod: HCNC | Performed by: INTERNAL MEDICINE

## 2022-01-18 ENCOUNTER — OFFICE VISIT (OUTPATIENT)
Dept: FAMILY MEDICINE | Facility: CLINIC | Age: 77
End: 2022-01-18
Payer: MEDICARE

## 2022-01-18 VITALS
DIASTOLIC BLOOD PRESSURE: 88 MMHG | TEMPERATURE: 98 F | BODY MASS INDEX: 25.07 KG/M2 | OXYGEN SATURATION: 98 % | SYSTOLIC BLOOD PRESSURE: 138 MMHG | HEART RATE: 67 BPM | WEIGHT: 169.75 LBS | RESPIRATION RATE: 16 BRPM

## 2022-01-18 DIAGNOSIS — E78.5 DYSLIPIDEMIA: Primary | ICD-10-CM

## 2022-01-18 DIAGNOSIS — I10 PRIMARY HYPERTENSION: ICD-10-CM

## 2022-01-18 DIAGNOSIS — E03.9 ACQUIRED HYPOTHYROIDISM: ICD-10-CM

## 2022-01-18 DIAGNOSIS — R13.10 SWALLOWING DISORDER: ICD-10-CM

## 2022-01-18 DIAGNOSIS — C61 PROSTATE CANCER: ICD-10-CM

## 2022-01-18 PROCEDURE — 99499 RISK ADDL DX/OHS AUDIT: ICD-10-PCS | Mod: S$GLB,,, | Performed by: INTERNAL MEDICINE

## 2022-01-18 PROCEDURE — 90694 FLU VACCINE - QUADRIVALENT - ADJUVANTED: ICD-10-PCS | Mod: HCNC,S$GLB,, | Performed by: INTERNAL MEDICINE

## 2022-01-18 PROCEDURE — 1159F MED LIST DOCD IN RCRD: CPT | Mod: HCNC,CPTII,S$GLB, | Performed by: INTERNAL MEDICINE

## 2022-01-18 PROCEDURE — 3079F DIAST BP 80-89 MM HG: CPT | Mod: HCNC,CPTII,S$GLB, | Performed by: INTERNAL MEDICINE

## 2022-01-18 PROCEDURE — 99214 OFFICE O/P EST MOD 30 MIN: CPT | Mod: HCNC,S$GLB,, | Performed by: INTERNAL MEDICINE

## 2022-01-18 PROCEDURE — G0008 ADMIN INFLUENZA VIRUS VAC: HCPCS | Mod: HCNC,S$GLB,, | Performed by: INTERNAL MEDICINE

## 2022-01-18 PROCEDURE — 99999 PR PBB SHADOW E&M-EST. PATIENT-LVL IV: CPT | Mod: PBBFAC,HCNC,, | Performed by: INTERNAL MEDICINE

## 2022-01-18 PROCEDURE — 1159F PR MEDICATION LIST DOCUMENTED IN MEDICAL RECORD: ICD-10-PCS | Mod: HCNC,CPTII,S$GLB, | Performed by: INTERNAL MEDICINE

## 2022-01-18 PROCEDURE — 99499 UNLISTED E&M SERVICE: CPT | Mod: S$GLB,,, | Performed by: INTERNAL MEDICINE

## 2022-01-18 PROCEDURE — 1126F PR PAIN SEVERITY QUANTIFIED, NO PAIN PRESENT: ICD-10-PCS | Mod: HCNC,CPTII,S$GLB, | Performed by: INTERNAL MEDICINE

## 2022-01-18 PROCEDURE — 99214 PR OFFICE/OUTPT VISIT, EST, LEVL IV, 30-39 MIN: ICD-10-PCS | Mod: HCNC,S$GLB,, | Performed by: INTERNAL MEDICINE

## 2022-01-18 PROCEDURE — 90694 VACC AIIV4 NO PRSRV 0.5ML IM: CPT | Mod: HCNC,S$GLB,, | Performed by: INTERNAL MEDICINE

## 2022-01-18 PROCEDURE — 3075F PR MOST RECENT SYSTOLIC BLOOD PRESS GE 130-139MM HG: ICD-10-PCS | Mod: HCNC,CPTII,S$GLB, | Performed by: INTERNAL MEDICINE

## 2022-01-18 PROCEDURE — 3079F PR MOST RECENT DIASTOLIC BLOOD PRESSURE 80-89 MM HG: ICD-10-PCS | Mod: HCNC,CPTII,S$GLB, | Performed by: INTERNAL MEDICINE

## 2022-01-18 PROCEDURE — G0008 FLU VACCINE - QUADRIVALENT - ADJUVANTED: ICD-10-PCS | Mod: HCNC,S$GLB,, | Performed by: INTERNAL MEDICINE

## 2022-01-18 PROCEDURE — 3075F SYST BP GE 130 - 139MM HG: CPT | Mod: HCNC,CPTII,S$GLB, | Performed by: INTERNAL MEDICINE

## 2022-01-18 PROCEDURE — 1126F AMNT PAIN NOTED NONE PRSNT: CPT | Mod: HCNC,CPTII,S$GLB, | Performed by: INTERNAL MEDICINE

## 2022-01-18 PROCEDURE — 99999 PR PBB SHADOW E&M-EST. PATIENT-LVL IV: ICD-10-PCS | Mod: PBBFAC,HCNC,, | Performed by: INTERNAL MEDICINE

## 2022-01-18 PROCEDURE — 1101F PR PT FALLS ASSESS DOC 0-1 FALLS W/OUT INJ PAST YR: ICD-10-PCS | Mod: HCNC,CPTII,S$GLB, | Performed by: INTERNAL MEDICINE

## 2022-01-18 PROCEDURE — 3288F PR FALLS RISK ASSESSMENT DOCUMENTED: ICD-10-PCS | Mod: HCNC,CPTII,S$GLB, | Performed by: INTERNAL MEDICINE

## 2022-01-18 PROCEDURE — 1101F PT FALLS ASSESS-DOCD LE1/YR: CPT | Mod: HCNC,CPTII,S$GLB, | Performed by: INTERNAL MEDICINE

## 2022-01-18 PROCEDURE — 3288F FALL RISK ASSESSMENT DOCD: CPT | Mod: HCNC,CPTII,S$GLB, | Performed by: INTERNAL MEDICINE

## 2022-01-18 NOTE — PROGRESS NOTES
Subjective:       Patient ID: David Amaro is a 76 y.o. male.    Chief Complaint: Follow-up (4m), Hypertension, Hyperlipidemia, and Hypothyroidism    Follow-up  Associated symptoms include arthralgias. Pertinent negatives include no abdominal pain, chest pain, chills, coughing, diaphoresis, fatigue, fever, headaches, joint swelling, myalgias, nausea, neck pain, numbness, rash, sore throat, vomiting or weakness.   Hypertension  Pertinent negatives include no chest pain, headaches, neck pain, palpitations or shortness of breath.   Hyperlipidemia  Pertinent negatives include no chest pain, myalgias or shortness of breath.     Past Medical History:   Diagnosis Date    Arthritis     Bilateral low back pain 9/2/2021    Cancer     prostate    CKD (chronic kidney disease)     Dyslipidemia     HTN (hypertension)     Hyperlipidemia     Hypothyroidism     Prostate cancer     Radiation induced proctitis     Thyroid disease      Past Surgical History:   Procedure Laterality Date    COLONOSCOPY N/A 4/3/2017    Procedure: COLONOSCOPY;  Surgeon: Olivier Grayson MD;  Location: [facility];  Service: Endoscopy;  Laterality: N/A;    HERNIA REPAIR  2009 approx    seed implants in prostate  02/2009    TONSILLECTOMY       Family History   Problem Relation Age of Onset    Stroke Mother     Cancer Mother     Colon cancer Mother     Diabetes Father     Cancer Sister         lung    Cancer Brother     Bone cancer Brother     Heart disease Maternal Grandmother      Social History     Socioeconomic History    Marital status:    Tobacco Use    Smoking status: Never Smoker    Smokeless tobacco: Never Used   Substance and Sexual Activity    Alcohol use: No    Drug use: No    Sexual activity: Yes     Partners: Female     Social Determinants of Health     Financial Resource Strain: Low Risk     Difficulty of Paying Living Expenses: Not hard at all   Food Insecurity: No Food Insecurity    Worried About Running  Out of Food in the Last Year: Never true    Ran Out of Food in the Last Year: Never true   Transportation Needs: No Transportation Needs    Lack of Transportation (Medical): No    Lack of Transportation (Non-Medical): No   Physical Activity: Insufficiently Active    Days of Exercise per Week: 1 day    Minutes of Exercise per Session: 130 min   Stress: No Stress Concern Present    Feeling of Stress : Not at all   Social Connections: Unknown    Frequency of Communication with Friends and Family: Three times a week    Frequency of Social Gatherings with Friends and Family: Once a week    Active Member of Clubs or Organizations: No    Attends Club or Organization Meetings: Never    Marital Status:      Review of Systems   Constitutional: Negative for activity change, appetite change, chills, diaphoresis, fatigue, fever and unexpected weight change.   HENT: Negative for drooling, ear discharge, ear pain, facial swelling, hearing loss, mouth sores, nosebleeds, postnasal drip, rhinorrhea, sinus pressure, sneezing, sore throat, tinnitus, trouble swallowing and voice change.    Eyes: Negative for photophobia, redness and visual disturbance.   Respiratory: Negative for apnea, cough, choking, chest tightness, shortness of breath and wheezing.    Cardiovascular: Negative for chest pain, palpitations and leg swelling.   Gastrointestinal: Negative for abdominal distention, abdominal pain, anal bleeding, blood in stool, constipation, diarrhea, nausea, rectal pain and vomiting.   Endocrine: Negative for cold intolerance, heat intolerance, polydipsia, polyphagia and polyuria.   Genitourinary: Negative for difficulty urinating, dysuria, enuresis, flank pain, frequency, genital sores, hematuria and urgency.   Musculoskeletal: Positive for arthralgias. Negative for back pain, gait problem, joint swelling, myalgias, neck pain and neck stiffness.   Skin: Negative for color change, pallor, rash and wound.    Allergic/Immunologic: Negative for food allergies and immunocompromised state.   Neurological: Negative for dizziness, tremors, seizures, syncope, facial asymmetry, speech difficulty, weakness, light-headedness, numbness and headaches.   Hematological: Negative for adenopathy. Does not bruise/bleed easily.   Psychiatric/Behavioral: Negative for agitation, behavioral problems, confusion, decreased concentration, dysphoric mood, hallucinations, self-injury, sleep disturbance and suicidal ideas. The patient is not nervous/anxious and is not hyperactive.        Objective:      Physical Exam  Vitals and nursing note reviewed.   Constitutional:       General: He is not in acute distress.     Appearance: Normal appearance. He is well-developed and well-nourished. He is not diaphoretic.   HENT:      Head: Normocephalic and atraumatic.      Mouth/Throat:      Pharynx: No oropharyngeal exudate.   Eyes:      General: No scleral icterus.     Pupils: Pupils are equal, round, and reactive to light.   Neck:      Thyroid: No thyromegaly.      Vascular: No carotid bruit or JVD.      Trachea: No tracheal deviation.   Cardiovascular:      Rate and Rhythm: Normal rate and regular rhythm.      Pulses: Intact distal pulses.      Heart sounds: Normal heart sounds.   Pulmonary:      Effort: Pulmonary effort is normal. No respiratory distress.      Breath sounds: Normal breath sounds. No wheezing or rales.   Chest:      Chest wall: No tenderness.   Abdominal:      General: Bowel sounds are normal. There is no distension.      Palpations: Abdomen is soft.      Tenderness: There is no abdominal tenderness. There is no guarding or rebound.   Musculoskeletal:         General: No tenderness or edema. Normal range of motion.      Cervical back: Normal range of motion and neck supple.   Lymphadenopathy:      Cervical: No cervical adenopathy.   Skin:     General: Skin is warm and dry.      Coloration: Skin is not pale.      Findings: No erythema  or rash.   Neurological:      Mental Status: He is alert and oriented to person, place, and time.   Psychiatric:         Mood and Affect: Mood and affect normal.         Behavior: Behavior normal.         Thought Content: Thought content normal.         Judgment: Judgment normal.         CMP  Sodium   Date Value Ref Range Status   01/14/2022 138 136 - 145 mmol/L Final     Potassium   Date Value Ref Range Status   01/14/2022 3.8 3.5 - 5.1 mmol/L Final     Chloride   Date Value Ref Range Status   01/14/2022 105 95 - 110 mmol/L Final     CO2   Date Value Ref Range Status   01/14/2022 26 23 - 29 mmol/L Final     Glucose   Date Value Ref Range Status   01/14/2022 103 70 - 110 mg/dL Final     BUN   Date Value Ref Range Status   01/14/2022 18 8 - 23 mg/dL Final     Creatinine   Date Value Ref Range Status   01/14/2022 1.3 0.5 - 1.4 mg/dL Final     Calcium   Date Value Ref Range Status   01/14/2022 8.9 8.7 - 10.5 mg/dL Final     Total Protein   Date Value Ref Range Status   09/15/2021 6.8 6.0 - 8.4 g/dL Final     Albumin   Date Value Ref Range Status   01/14/2022 3.5 3.5 - 5.2 g/dL Final     Total Bilirubin   Date Value Ref Range Status   09/15/2021 0.6 0.1 - 1.0 mg/dL Final     Comment:     For infants and newborns, interpretation of results should be based  on gestational age, weight and in agreement with clinical  observations.    Premature Infant recommended reference ranges:  Up to 24 hours.............<8.0 mg/dL  Up to 48 hours............<12.0 mg/dL  3-5 days..................<15.0 mg/dL  6-29 days.................<15.0 mg/dL       Alkaline Phosphatase   Date Value Ref Range Status   09/15/2021 76 55 - 135 U/L Final     AST   Date Value Ref Range Status   09/15/2021 24 10 - 40 U/L Final     ALT   Date Value Ref Range Status   09/15/2021 25 10 - 44 U/L Final     Anion Gap   Date Value Ref Range Status   01/14/2022 7 (L) 8 - 16 mmol/L Final     eGFR if    Date Value Ref Range Status   01/14/2022 >60.0  >60 mL/min/1.73 m^2 Final     eGFR if non    Date Value Ref Range Status   01/14/2022 53.4 (A) >60 mL/min/1.73 m^2 Final     Comment:     Calculation used to obtain the estimated glomerular filtration  rate (eGFR) is the CKD-EPI equation.        Lab Results   Component Value Date    WBC 5.37 01/14/2022    HGB 12.4 (L) 01/14/2022    HCT 38.7 (L) 01/14/2022    MCV 96 01/14/2022     01/14/2022     Lab Results   Component Value Date    CHOL 200 (H) 09/14/2020     Lab Results   Component Value Date    HDL 71 09/14/2020     Lab Results   Component Value Date    LDLCALC 123.4 09/14/2020     Lab Results   Component Value Date    TRIG 28 (L) 09/14/2020     Lab Results   Component Value Date    CHOLHDL 35.5 09/14/2020     Lab Results   Component Value Date    TSH 1.954 03/15/2021     Lab Results   Component Value Date    HGBA1C 5.7 12/20/2005     Assessment:       1. Dyslipidemia    2. Primary hypertension    3. Acquired hypothyroidism    4. Prostate cancer    5. Swallowing disorder        Plan:   Dyslipidemia    Primary hypertension    Acquired hypothyroidism    Prostate cancer    Swallowing disorder  -     Ambulatory referral/consult to ENT; Future; Expected date: 01/25/2022    Other orders  -     Influenza - Quadrivalent (Adjuvanted)    Stable-------------continue meds,    Sees urology dr delvalle,     F/u nephrology.           As above-------------------f/u 4 months---------

## 2022-01-20 ENCOUNTER — OFFICE VISIT (OUTPATIENT)
Dept: OTOLARYNGOLOGY | Facility: CLINIC | Age: 77
End: 2022-01-20
Payer: MEDICARE

## 2022-01-20 ENCOUNTER — OFFICE VISIT (OUTPATIENT)
Dept: NEPHROLOGY | Facility: CLINIC | Age: 77
End: 2022-01-20
Payer: MEDICARE

## 2022-01-20 VITALS
RESPIRATION RATE: 20 BRPM | SYSTOLIC BLOOD PRESSURE: 172 MMHG | BODY MASS INDEX: 25.04 KG/M2 | HEIGHT: 69 IN | WEIGHT: 168.88 LBS | HEART RATE: 94 BPM | HEIGHT: 69 IN | DIASTOLIC BLOOD PRESSURE: 92 MMHG | BODY MASS INDEX: 25.01 KG/M2 | WEIGHT: 169.06 LBS

## 2022-01-20 DIAGNOSIS — R13.10 SWALLOWING DISORDER: ICD-10-CM

## 2022-01-20 DIAGNOSIS — I12.9 STAGE 2 CHRONIC KIDNEY DISEASE DUE TO BENIGN HYPERTENSION: Primary | ICD-10-CM

## 2022-01-20 DIAGNOSIS — N18.2 STAGE 2 CHRONIC KIDNEY DISEASE DUE TO BENIGN HYPERTENSION: Primary | ICD-10-CM

## 2022-01-20 PROCEDURE — 3080F DIAST BP >= 90 MM HG: CPT | Mod: HCNC,CPTII,S$GLB, | Performed by: INTERNAL MEDICINE

## 2022-01-20 PROCEDURE — 3077F PR MOST RECENT SYSTOLIC BLOOD PRESSURE >= 140 MM HG: ICD-10-PCS | Mod: HCNC,CPTII,S$GLB, | Performed by: INTERNAL MEDICINE

## 2022-01-20 PROCEDURE — 1126F PR PAIN SEVERITY QUANTIFIED, NO PAIN PRESENT: ICD-10-PCS | Mod: HCNC,CPTII,S$GLB, | Performed by: STUDENT IN AN ORGANIZED HEALTH CARE EDUCATION/TRAINING PROGRAM

## 2022-01-20 PROCEDURE — 31575 DIAGNOSTIC LARYNGOSCOPY: CPT | Mod: HCNC,S$GLB,, | Performed by: STUDENT IN AN ORGANIZED HEALTH CARE EDUCATION/TRAINING PROGRAM

## 2022-01-20 PROCEDURE — 1101F PT FALLS ASSESS-DOCD LE1/YR: CPT | Mod: HCNC,CPTII,S$GLB, | Performed by: STUDENT IN AN ORGANIZED HEALTH CARE EDUCATION/TRAINING PROGRAM

## 2022-01-20 PROCEDURE — 1101F PT FALLS ASSESS-DOCD LE1/YR: CPT | Mod: HCNC,CPTII,S$GLB, | Performed by: INTERNAL MEDICINE

## 2022-01-20 PROCEDURE — 1159F MED LIST DOCD IN RCRD: CPT | Mod: HCNC,CPTII,S$GLB, | Performed by: STUDENT IN AN ORGANIZED HEALTH CARE EDUCATION/TRAINING PROGRAM

## 2022-01-20 PROCEDURE — 1101F PR PT FALLS ASSESS DOC 0-1 FALLS W/OUT INJ PAST YR: ICD-10-PCS | Mod: HCNC,CPTII,S$GLB, | Performed by: INTERNAL MEDICINE

## 2022-01-20 PROCEDURE — 99215 PR OFFICE/OUTPT VISIT, EST, LEVL V, 40-54 MIN: ICD-10-PCS | Mod: HCNC,S$GLB,, | Performed by: INTERNAL MEDICINE

## 2022-01-20 PROCEDURE — 99999 PR PBB SHADOW E&M-EST. PATIENT-LVL III: CPT | Mod: PBBFAC,HCNC,, | Performed by: STUDENT IN AN ORGANIZED HEALTH CARE EDUCATION/TRAINING PROGRAM

## 2022-01-20 PROCEDURE — 1159F MED LIST DOCD IN RCRD: CPT | Mod: HCNC,CPTII,S$GLB, | Performed by: INTERNAL MEDICINE

## 2022-01-20 PROCEDURE — 3288F PR FALLS RISK ASSESSMENT DOCUMENTED: ICD-10-PCS | Mod: HCNC,CPTII,S$GLB, | Performed by: INTERNAL MEDICINE

## 2022-01-20 PROCEDURE — 3288F PR FALLS RISK ASSESSMENT DOCUMENTED: ICD-10-PCS | Mod: HCNC,CPTII,S$GLB, | Performed by: STUDENT IN AN ORGANIZED HEALTH CARE EDUCATION/TRAINING PROGRAM

## 2022-01-20 PROCEDURE — 1159F PR MEDICATION LIST DOCUMENTED IN MEDICAL RECORD: ICD-10-PCS | Mod: HCNC,CPTII,S$GLB, | Performed by: INTERNAL MEDICINE

## 2022-01-20 PROCEDURE — 99215 OFFICE O/P EST HI 40 MIN: CPT | Mod: HCNC,S$GLB,, | Performed by: INTERNAL MEDICINE

## 2022-01-20 PROCEDURE — 1159F PR MEDICATION LIST DOCUMENTED IN MEDICAL RECORD: ICD-10-PCS | Mod: HCNC,CPTII,S$GLB, | Performed by: STUDENT IN AN ORGANIZED HEALTH CARE EDUCATION/TRAINING PROGRAM

## 2022-01-20 PROCEDURE — 3080F PR MOST RECENT DIASTOLIC BLOOD PRESSURE >= 90 MM HG: ICD-10-PCS | Mod: HCNC,CPTII,S$GLB, | Performed by: INTERNAL MEDICINE

## 2022-01-20 PROCEDURE — 3288F FALL RISK ASSESSMENT DOCD: CPT | Mod: HCNC,CPTII,S$GLB, | Performed by: INTERNAL MEDICINE

## 2022-01-20 PROCEDURE — 1126F AMNT PAIN NOTED NONE PRSNT: CPT | Mod: HCNC,CPTII,S$GLB, | Performed by: INTERNAL MEDICINE

## 2022-01-20 PROCEDURE — 99203 OFFICE O/P NEW LOW 30 MIN: CPT | Mod: 25,HCNC,S$GLB, | Performed by: STUDENT IN AN ORGANIZED HEALTH CARE EDUCATION/TRAINING PROGRAM

## 2022-01-20 PROCEDURE — 99203 PR OFFICE/OUTPT VISIT, NEW, LEVL III, 30-44 MIN: ICD-10-PCS | Mod: 25,HCNC,S$GLB, | Performed by: STUDENT IN AN ORGANIZED HEALTH CARE EDUCATION/TRAINING PROGRAM

## 2022-01-20 PROCEDURE — 99999 PR PBB SHADOW E&M-EST. PATIENT-LVL III: CPT | Mod: PBBFAC,HCNC,, | Performed by: INTERNAL MEDICINE

## 2022-01-20 PROCEDURE — 1126F PR PAIN SEVERITY QUANTIFIED, NO PAIN PRESENT: ICD-10-PCS | Mod: HCNC,CPTII,S$GLB, | Performed by: INTERNAL MEDICINE

## 2022-01-20 PROCEDURE — 3077F SYST BP >= 140 MM HG: CPT | Mod: HCNC,CPTII,S$GLB, | Performed by: INTERNAL MEDICINE

## 2022-01-20 PROCEDURE — 31575 PR LARYNGOSCOPY, FLEXIBLE; DIAGNOSTIC: ICD-10-PCS | Mod: HCNC,S$GLB,, | Performed by: STUDENT IN AN ORGANIZED HEALTH CARE EDUCATION/TRAINING PROGRAM

## 2022-01-20 PROCEDURE — 99999 PR PBB SHADOW E&M-EST. PATIENT-LVL III: ICD-10-PCS | Mod: PBBFAC,HCNC,, | Performed by: INTERNAL MEDICINE

## 2022-01-20 PROCEDURE — 1126F AMNT PAIN NOTED NONE PRSNT: CPT | Mod: HCNC,CPTII,S$GLB, | Performed by: STUDENT IN AN ORGANIZED HEALTH CARE EDUCATION/TRAINING PROGRAM

## 2022-01-20 PROCEDURE — 3288F FALL RISK ASSESSMENT DOCD: CPT | Mod: HCNC,CPTII,S$GLB, | Performed by: STUDENT IN AN ORGANIZED HEALTH CARE EDUCATION/TRAINING PROGRAM

## 2022-01-20 PROCEDURE — 1101F PR PT FALLS ASSESS DOC 0-1 FALLS W/OUT INJ PAST YR: ICD-10-PCS | Mod: HCNC,CPTII,S$GLB, | Performed by: STUDENT IN AN ORGANIZED HEALTH CARE EDUCATION/TRAINING PROGRAM

## 2022-01-20 PROCEDURE — 99999 PR PBB SHADOW E&M-EST. PATIENT-LVL III: ICD-10-PCS | Mod: PBBFAC,HCNC,, | Performed by: STUDENT IN AN ORGANIZED HEALTH CARE EDUCATION/TRAINING PROGRAM

## 2022-01-20 NOTE — PROGRESS NOTES
Chief complaint:  No chief complaint on file.       Referring Provider:  Stepan Pierre Md  [id number] [place] [address]      History of present illness:     Mr. Amaro is a 76 y.o. with HTN, and hypothyroidism presenting for evaluation of swallowing issue.     He  has been referred by Dr. Pierre.      When he swallows there is a gulping sound that his wife is concerned about. It has been present for as long as he can remember.  No choking, coughing, dysphagia, odynophagia, regurgitation, impaction, voice change, otalgia, globus sensation, recurrent pneumonia, neck mass, history of radiation.     This sound happens with liquids more than solids. Last only a second or so during swallowing phase.     History      Past Medical History:   Past Medical History:   Diagnosis Date    Arthritis     Bilateral low back pain 9/2/2021    Cancer     prostate    CKD (chronic kidney disease)     Dyslipidemia     HTN (hypertension)     Hyperlipidemia     Hypothyroidism     Prostate cancer     Radiation induced proctitis     Thyroid disease          Past Surgical History:  Past Surgical History:   Procedure Laterality Date    COLONOSCOPY N/A 4/3/2017    Procedure: COLONOSCOPY;  Surgeon: Olivier Grayson MD;  Location: [facility];  Service: Endoscopy;  Laterality: N/A;    HERNIA REPAIR  2009 approx    seed implants in prostate  02/2009    TONSILLECTOMY           Medications: Medication list reviewed. He  has a current medication list which includes the following prescription(s): doxazosin, doxazosin, ergocalciferol, gabapentin, levothyroxine, and pneumovax-23.     Allergies:   Review of patient's allergies indicates:   Allergen Reactions    No known drug allergies          Family history: family history includes Bone cancer in his brother; Cancer in his brother, mother, and sister; Colon cancer in his mother; Diabetes in his father; Heart disease in his maternal grandmother; Stroke in his  mother.         Social History          Alcohol use:  reports no history of alcohol use.            Tobacco:  reports that he has never smoked. He has never used smokeless tobacco.         Physical Examination      Vitals: There were no vitals taken for this visit.      General: Well developed, well nourished, well hydrated. Verbal with a strong voice and not dysphonic. Normal swallowing sounds with drinking water    Voice: mild presbylarynx, no dysarthria      Head/Face: Normocephalic, atraumatic. No scars or lesions. Facial musculature equal.     Eyes: No scleral icterus or conjunctival hemorrhage. EOMI. PERRLA.     Ears:     · Right ear: No gross deformity. EAC is clear of debris and erythema. TM are intact with a pneumatized middle ear. No signs of retraction, fluid or infection.      · Left ear: No gross deformity. EAC is clear of debris and erythema. TM are intact with a pneumatized middle ear. No signs of retraction, fluid or infection.      Nose: No gross deformity or lesions. No purulent discharge. No significant NSD.      Mouth/Oropharynx: Lips without any lesions. No mucosal lesions within the oropharynx. No tonsillar exudate or lesions. Pharyngeal walls symmetrical. Uvula midline. Tongue midline without lesions.     Neck: Trachea midline. No masses. No thyromegaly or nodules palpated.     Lymphatic: No lymphadenopathy in the neck.     Extremities: No cyanosis. Warm and well-perfused.     Skin: No scars or lesions on face or neck.      Neurologic: Moving all extremities without gross abnormality.CN II-XII grossly intact. House-Brackmann 1/6. No signs of nystagmus.        Data reviewed      Laboratory:      WBC 5.37  hgb 12.4  plt 195       Procedures:    Procedure -Transnasal fiberoptic laryngoscopy     Surgeon: Kota Sanchez M.D. .      Anesthesia: topical 0.05% oxymetazoline with 4% lidocaine      Complications: None.     Description of Procedure: With the patient in the sitting position, topical  lidocaine and oxymetazoline was applied to the nose. The scope was passed through the nose. Examination was carried out of the nose, nasopharynx, oropharynx, hypopharynx, and larynx with findings as noted above. Scope was removed. The patient tolerated the procedure well.      Findings: No masses or lesions in the nose, nasopharynx, oropharynx, hypopharynx, or larynx. Vocal fold abduction and adduction is normal. No pooling of secretions in the piriform sinuses, penetration, or aspiration.        Assessment/Plan:    Swallowing disorder     His wife is concerned about the sound he makes with swallowing. He has no concerning symptoms per HPI and this has been present as long as he can remember. TFL and clinical swallow were reassuring as well. If he notices a change to his swallowing, pain, globus, etc we will plan for swallow study and repeat evaluation. He will followup as needed.        Kota Sanchez MD  [facility]   [facility]  [id number] [place] [address].  [place] [id number]  P: [phone]  F: [phone]

## 2022-05-18 ENCOUNTER — LAB VISIT (OUTPATIENT)
Dept: LAB | Facility: HOSPITAL | Age: 77
End: 2022-05-18
Attending: INTERNAL MEDICINE
Payer: MEDICARE

## 2022-05-18 ENCOUNTER — OFFICE VISIT (OUTPATIENT)
Dept: FAMILY MEDICINE | Facility: CLINIC | Age: 77
End: 2022-05-18
Payer: MEDICARE

## 2022-05-18 VITALS
HEART RATE: 70 BPM | SYSTOLIC BLOOD PRESSURE: 130 MMHG | OXYGEN SATURATION: 98 % | BODY MASS INDEX: 24.61 KG/M2 | RESPIRATION RATE: 16 BRPM | DIASTOLIC BLOOD PRESSURE: 80 MMHG | TEMPERATURE: 98 F | WEIGHT: 166.69 LBS

## 2022-05-18 DIAGNOSIS — D64.9 ANEMIA, UNSPECIFIED TYPE: ICD-10-CM

## 2022-05-18 DIAGNOSIS — Z12.11 COLON CANCER SCREENING: ICD-10-CM

## 2022-05-18 DIAGNOSIS — I10 PRIMARY HYPERTENSION: ICD-10-CM

## 2022-05-18 DIAGNOSIS — E03.9 ACQUIRED HYPOTHYROIDISM: ICD-10-CM

## 2022-05-18 DIAGNOSIS — C61 PROSTATE CANCER: Primary | ICD-10-CM

## 2022-05-18 DIAGNOSIS — E23.6 EMPTY SELLA: ICD-10-CM

## 2022-05-18 DIAGNOSIS — E78.5 DYSLIPIDEMIA: ICD-10-CM

## 2022-05-18 PROBLEM — N18.30 STAGE 3 CHRONIC KIDNEY DISEASE: Status: RESOLVED | Noted: 2018-11-14 | Resolved: 2022-05-18

## 2022-05-18 PROCEDURE — 3075F SYST BP GE 130 - 139MM HG: CPT | Mod: CPTII,S$GLB,, | Performed by: INTERNAL MEDICINE

## 2022-05-18 PROCEDURE — 99499 UNLISTED E&M SERVICE: CPT | Mod: S$GLB,,, | Performed by: INTERNAL MEDICINE

## 2022-05-18 PROCEDURE — 3079F PR MOST RECENT DIASTOLIC BLOOD PRESSURE 80-89 MM HG: ICD-10-PCS | Mod: CPTII,S$GLB,, | Performed by: INTERNAL MEDICINE

## 2022-05-18 PROCEDURE — 99999 PR PBB SHADOW E&M-EST. PATIENT-LVL IV: ICD-10-PCS | Mod: PBBFAC,,, | Performed by: INTERNAL MEDICINE

## 2022-05-18 PROCEDURE — 3075F PR MOST RECENT SYSTOLIC BLOOD PRESS GE 130-139MM HG: ICD-10-PCS | Mod: CPTII,S$GLB,, | Performed by: INTERNAL MEDICINE

## 2022-05-18 PROCEDURE — 1101F PT FALLS ASSESS-DOCD LE1/YR: CPT | Mod: CPTII,S$GLB,, | Performed by: INTERNAL MEDICINE

## 2022-05-18 PROCEDURE — 1159F MED LIST DOCD IN RCRD: CPT | Mod: CPTII,S$GLB,, | Performed by: INTERNAL MEDICINE

## 2022-05-18 PROCEDURE — 99214 OFFICE O/P EST MOD 30 MIN: CPT | Mod: S$GLB,,, | Performed by: INTERNAL MEDICINE

## 2022-05-18 PROCEDURE — 99214 PR OFFICE/OUTPT VISIT, EST, LEVL IV, 30-39 MIN: ICD-10-PCS | Mod: S$GLB,,, | Performed by: INTERNAL MEDICINE

## 2022-05-18 PROCEDURE — 99499 RISK ADDL DX/OHS AUDIT: ICD-10-PCS | Mod: S$GLB,,, | Performed by: INTERNAL MEDICINE

## 2022-05-18 PROCEDURE — 3288F PR FALLS RISK ASSESSMENT DOCUMENTED: ICD-10-PCS | Mod: CPTII,S$GLB,, | Performed by: INTERNAL MEDICINE

## 2022-05-18 PROCEDURE — 3288F FALL RISK ASSESSMENT DOCD: CPT | Mod: CPTII,S$GLB,, | Performed by: INTERNAL MEDICINE

## 2022-05-18 PROCEDURE — 36415 COLL VENOUS BLD VENIPUNCTURE: CPT | Mod: PO | Performed by: INTERNAL MEDICINE

## 2022-05-18 PROCEDURE — 80061 LIPID PANEL: CPT | Performed by: INTERNAL MEDICINE

## 2022-05-18 PROCEDURE — 1126F AMNT PAIN NOTED NONE PRSNT: CPT | Mod: CPTII,S$GLB,, | Performed by: INTERNAL MEDICINE

## 2022-05-18 PROCEDURE — 3079F DIAST BP 80-89 MM HG: CPT | Mod: CPTII,S$GLB,, | Performed by: INTERNAL MEDICINE

## 2022-05-18 PROCEDURE — 84443 ASSAY THYROID STIM HORMONE: CPT | Performed by: INTERNAL MEDICINE

## 2022-05-18 PROCEDURE — 1126F PR PAIN SEVERITY QUANTIFIED, NO PAIN PRESENT: ICD-10-PCS | Mod: CPTII,S$GLB,, | Performed by: INTERNAL MEDICINE

## 2022-05-18 PROCEDURE — 1159F PR MEDICATION LIST DOCUMENTED IN MEDICAL RECORD: ICD-10-PCS | Mod: CPTII,S$GLB,, | Performed by: INTERNAL MEDICINE

## 2022-05-18 PROCEDURE — 1101F PR PT FALLS ASSESS DOC 0-1 FALLS W/OUT INJ PAST YR: ICD-10-PCS | Mod: CPTII,S$GLB,, | Performed by: INTERNAL MEDICINE

## 2022-05-18 PROCEDURE — 99999 PR PBB SHADOW E&M-EST. PATIENT-LVL IV: CPT | Mod: PBBFAC,,, | Performed by: INTERNAL MEDICINE

## 2022-05-18 NOTE — PROGRESS NOTES
Subjective:       Patient ID: David Amaro is a 76 y.o. male.    Chief Complaint: Follow-up (4m), Hypertension, Hyperlipidemia, and Hypothyroidism    Follow-up  Associated symptoms include arthralgias. Pertinent negatives include no abdominal pain, chest pain, chills, coughing, diaphoresis, fatigue, fever, headaches, joint swelling, myalgias, nausea, neck pain, numbness, rash, sore throat, vomiting or weakness.   Hypertension  Pertinent negatives include no chest pain, headaches, neck pain, palpitations or shortness of breath.   Hyperlipidemia  Pertinent negatives include no chest pain, myalgias or shortness of breath.     Past Medical History:   Diagnosis Date    Arthritis     Bilateral low back pain 9/2/2021    Cancer     prostate    CKD (chronic kidney disease)     Dyslipidemia     HTN (hypertension)     Hyperlipidemia     Hypothyroidism     Prostate cancer     Radiation induced proctitis     Thyroid disease      Past Surgical History:   Procedure Laterality Date    COLONOSCOPY N/A 4/3/2017    Procedure: COLONOSCOPY;  Surgeon: Olivier Grayson MD;  Location: [facility];  Service: Endoscopy;  Laterality: N/A;    HERNIA REPAIR  2009 approx    seed implants in prostate  02/2009    TONSILLECTOMY       Family History   Problem Relation Age of Onset    Stroke Mother     Cancer Mother     Colon cancer Mother     Diabetes Father     Cancer Sister         lung    Cancer Brother     Bone cancer Brother     Heart disease Maternal Grandmother      Social History     Socioeconomic History    Marital status:    Tobacco Use    Smoking status: Never Smoker    Smokeless tobacco: Never Used   Substance and Sexual Activity    Alcohol use: No    Drug use: No    Sexual activity: Yes     Partners: Female     Social Determinants of Health     Financial Resource Strain: Low Risk     Difficulty of Paying Living Expenses: Not hard at all   Food Insecurity: No Food Insecurity    Worried About Running  Out of Food in the Last Year: Never true    Ran Out of Food in the Last Year: Never true   Transportation Needs: No Transportation Needs    Lack of Transportation (Medical): No    Lack of Transportation (Non-Medical): No   Physical Activity: Insufficiently Active    Days of Exercise per Week: 1 day    Minutes of Exercise per Session: 20 min   Stress: No Stress Concern Present    Feeling of Stress : Not at all   Social Connections: Unknown    Frequency of Communication with Friends and Family: Three times a week    Frequency of Social Gatherings with Friends and Family: Once a week    Active Member of Clubs or Organizations: No    Attends Club or Organization Meetings: Never    Marital Status:    Housing Stability: Low Risk     Unable to Pay for Housing in the Last Year: No    Number of Places Lived in the Last Year: 1    Unstable Housing in the Last Year: No     Review of Systems   Constitutional: Negative for activity change, appetite change, chills, diaphoresis, fatigue, fever and unexpected weight change.   HENT: Negative for drooling, ear discharge, ear pain, facial swelling, hearing loss, mouth sores, nosebleeds, postnasal drip, rhinorrhea, sinus pressure, sneezing, sore throat, tinnitus, trouble swallowing and voice change.    Eyes: Negative for photophobia, redness and visual disturbance.   Respiratory: Negative for apnea, cough, choking, chest tightness, shortness of breath and wheezing.    Cardiovascular: Negative for chest pain, palpitations and leg swelling.   Gastrointestinal: Negative for abdominal distention, abdominal pain, anal bleeding, blood in stool, constipation, diarrhea, nausea, rectal pain and vomiting.   Endocrine: Negative for cold intolerance, heat intolerance, polydipsia, polyphagia and polyuria.   Genitourinary: Negative for difficulty urinating, dysuria, enuresis, flank pain, frequency, genital sores, hematuria and urgency.   Musculoskeletal: Positive for  arthralgias. Negative for back pain, gait problem, joint swelling, myalgias, neck pain and neck stiffness.   Skin: Negative for color change, pallor, rash and wound.   Allergic/Immunologic: Negative for food allergies and immunocompromised state.   Neurological: Negative for dizziness, tremors, seizures, syncope, facial asymmetry, speech difficulty, weakness, light-headedness, numbness and headaches.   Hematological: Negative for adenopathy. Does not bruise/bleed easily.   Psychiatric/Behavioral: Negative for agitation, behavioral problems, confusion, decreased concentration, dysphoric mood, hallucinations, self-injury, sleep disturbance and suicidal ideas. The patient is not nervous/anxious and is not hyperactive.        Objective:      Physical Exam  Vitals and nursing note reviewed.   Constitutional:       General: He is not in acute distress.     Appearance: Normal appearance. He is well-developed. He is not diaphoretic.   HENT:      Head: Normocephalic and atraumatic.      Mouth/Throat:      Pharynx: No oropharyngeal exudate.   Eyes:      General: No scleral icterus.     Pupils: Pupils are equal, round, and reactive to light.   Neck:      Thyroid: No thyromegaly.      Vascular: No carotid bruit or JVD.      Trachea: No tracheal deviation.   Cardiovascular:      Rate and Rhythm: Normal rate and regular rhythm.      Heart sounds: Normal heart sounds.   Pulmonary:      Effort: Pulmonary effort is normal. No respiratory distress.      Breath sounds: Normal breath sounds. No wheezing or rales.   Chest:      Chest wall: No tenderness.   Abdominal:      General: Bowel sounds are normal. There is no distension.      Palpations: Abdomen is soft.      Tenderness: There is no abdominal tenderness. There is no guarding or rebound.   Musculoskeletal:         General: No tenderness. Normal range of motion.      Cervical back: Normal range of motion and neck supple.   Lymphadenopathy:      Cervical: No cervical adenopathy.    Skin:     General: Skin is warm and dry.      Coloration: Skin is not pale.      Findings: No erythema or rash.   Neurological:      Mental Status: He is alert and oriented to person, place, and time.   Psychiatric:         Behavior: Behavior normal.         Thought Content: Thought content normal.         Judgment: Judgment normal.         CMP  Sodium   Date Value Ref Range Status   01/14/2022 138 136 - 145 mmol/L Final     Potassium   Date Value Ref Range Status   01/14/2022 3.8 3.5 - 5.1 mmol/L Final     Chloride   Date Value Ref Range Status   01/14/2022 105 95 - 110 mmol/L Final     CO2   Date Value Ref Range Status   01/14/2022 26 23 - 29 mmol/L Final     Glucose   Date Value Ref Range Status   01/14/2022 103 70 - 110 mg/dL Final     BUN   Date Value Ref Range Status   01/14/2022 18 8 - 23 mg/dL Final     Creatinine   Date Value Ref Range Status   01/14/2022 1.3 0.5 - 1.4 mg/dL Final     Calcium   Date Value Ref Range Status   01/14/2022 8.9 8.7 - 10.5 mg/dL Final     Total Protein   Date Value Ref Range Status   09/15/2021 6.8 6.0 - 8.4 g/dL Final     Albumin   Date Value Ref Range Status   01/14/2022 3.5 3.5 - 5.2 g/dL Final     Total Bilirubin   Date Value Ref Range Status   09/15/2021 0.6 0.1 - 1.0 mg/dL Final     Comment:     For infants and newborns, interpretation of results should be based  on gestational age, weight and in agreement with clinical  observations.    Premature Infant recommended reference ranges:  Up to 24 hours.............<8.0 mg/dL  Up to 48 hours............<12.0 mg/dL  3-5 days..................<15.0 mg/dL  6-29 days.................<15.0 mg/dL       Alkaline Phosphatase   Date Value Ref Range Status   09/15/2021 76 55 - 135 U/L Final     AST   Date Value Ref Range Status   09/15/2021 24 10 - 40 U/L Final     ALT   Date Value Ref Range Status   09/15/2021 25 10 - 44 U/L Final     Anion Gap   Date Value Ref Range Status   01/14/2022 7 (L) 8 - 16 mmol/L Final     eGFR if African  American   Date Value Ref Range Status   01/14/2022 >60.0 >60 mL/min/1.73 m^2 Final     eGFR if non    Date Value Ref Range Status   01/14/2022 53.4 (A) >60 mL/min/1.73 m^2 Final     Comment:     Calculation used to obtain the estimated glomerular filtration  rate (eGFR) is the CKD-EPI equation.        Lab Results   Component Value Date    WBC 5.37 01/14/2022    HGB 12.4 (L) 01/14/2022    HCT 38.7 (L) 01/14/2022    MCV 96 01/14/2022     01/14/2022     Lab Results   Component Value Date    CHOL 200 (H) 09/14/2020     Lab Results   Component Value Date    HDL 71 09/14/2020     Lab Results   Component Value Date    LDLCALC 123.4 09/14/2020     Lab Results   Component Value Date    TRIG 28 (L) 09/14/2020     Lab Results   Component Value Date    CHOLHDL 35.5 09/14/2020     Lab Results   Component Value Date    TSH 1.954 03/15/2021     Lab Results   Component Value Date    HGBA1C 5.7 12/20/2005     Assessment:       1. Prostate cancer    2. Acquired hypothyroidism    3. Primary hypertension    4. Dyslipidemia    5. Anemia, unspecified type    6. Empty sella    7. Colon cancer screening        Plan:   Prostate cancer    Acquired hypothyroidism  -     TSH; Future; Expected date: 05/18/2022    Primary hypertension    Dyslipidemia  -     Lipid Panel; Future; Expected date: 05/18/2022    Anemia, unspecified type    Empty sella    Colon cancer screening  -     Ambulatory referral/consult to Endo Procedure ; Future; Expected date: 05/19/2022    Stable----------continue meds.         As above------------sees cards dr cunningham,  Urology dr delvalle.        F/u 4 months--------

## 2022-05-19 LAB
CHOLEST SERPL-MCNC: 192 MG/DL (ref 120–199)
CHOLEST/HDLC SERPL: 2.5 {RATIO} (ref 2–5)
HDLC SERPL-MCNC: 77 MG/DL (ref 40–75)
HDLC SERPL: 40.1 % (ref 20–50)
LDLC SERPL CALC-MCNC: 109.8 MG/DL (ref 63–159)
NONHDLC SERPL-MCNC: 115 MG/DL
TRIGL SERPL-MCNC: 26 MG/DL (ref 30–150)
TSH SERPL DL<=0.005 MIU/L-ACNC: 2.19 UIU/ML (ref 0.4–4)

## 2022-05-27 ENCOUNTER — TELEPHONE (OUTPATIENT)
Dept: ADMINISTRATIVE | Facility: HOSPITAL | Age: 77
End: 2022-05-27
Payer: MEDICARE

## 2022-06-01 ENCOUNTER — OFFICE VISIT (OUTPATIENT)
Dept: FAMILY MEDICINE | Facility: CLINIC | Age: 77
End: 2022-06-01
Payer: MEDICARE

## 2022-06-01 VITALS
HEART RATE: 60 BPM | RESPIRATION RATE: 18 BRPM | OXYGEN SATURATION: 95 % | DIASTOLIC BLOOD PRESSURE: 82 MMHG | BODY MASS INDEX: 24.59 KG/M2 | TEMPERATURE: 97 F | SYSTOLIC BLOOD PRESSURE: 164 MMHG | HEIGHT: 69 IN | WEIGHT: 166 LBS

## 2022-06-01 DIAGNOSIS — Z00.00 ENCOUNTER FOR PREVENTIVE HEALTH EXAMINATION: Primary | ICD-10-CM

## 2022-06-01 DIAGNOSIS — C61 PROSTATE CANCER: ICD-10-CM

## 2022-06-01 DIAGNOSIS — E23.6 EMPTY SELLA: ICD-10-CM

## 2022-06-01 DIAGNOSIS — I10 PRIMARY HYPERTENSION: ICD-10-CM

## 2022-06-01 DIAGNOSIS — E03.9 ACQUIRED HYPOTHYROIDISM: ICD-10-CM

## 2022-06-01 DIAGNOSIS — K57.30 DIVERTICULOSIS OF LARGE INTESTINE WITHOUT HEMORRHAGE: Chronic | ICD-10-CM

## 2022-06-01 DIAGNOSIS — N25.81 HYPERPARATHYROIDISM, SECONDARY RENAL: ICD-10-CM

## 2022-06-01 DIAGNOSIS — E78.5 DYSLIPIDEMIA: ICD-10-CM

## 2022-06-01 DIAGNOSIS — D64.9 ANEMIA, UNSPECIFIED TYPE: ICD-10-CM

## 2022-06-01 DIAGNOSIS — Z86.010 HISTORY OF COLON POLYPS: ICD-10-CM

## 2022-06-01 DIAGNOSIS — N40.0 BENIGN PROSTATIC HYPERPLASIA WITHOUT LOWER URINARY TRACT SYMPTOMS: ICD-10-CM

## 2022-06-01 PROCEDURE — 1101F PR PT FALLS ASSESS DOC 0-1 FALLS W/OUT INJ PAST YR: ICD-10-PCS | Mod: CPTII,S$GLB,, | Performed by: NURSE PRACTITIONER

## 2022-06-01 PROCEDURE — G0439 PR MEDICARE ANNUAL WELLNESS SUBSEQUENT VISIT: ICD-10-PCS | Mod: S$GLB,,, | Performed by: NURSE PRACTITIONER

## 2022-06-01 PROCEDURE — 1126F PR PAIN SEVERITY QUANTIFIED, NO PAIN PRESENT: ICD-10-PCS | Mod: CPTII,S$GLB,, | Performed by: NURSE PRACTITIONER

## 2022-06-01 PROCEDURE — 1159F PR MEDICATION LIST DOCUMENTED IN MEDICAL RECORD: ICD-10-PCS | Mod: CPTII,S$GLB,, | Performed by: NURSE PRACTITIONER

## 2022-06-01 PROCEDURE — 3077F SYST BP >= 140 MM HG: CPT | Mod: CPTII,S$GLB,, | Performed by: NURSE PRACTITIONER

## 2022-06-01 PROCEDURE — 3079F PR MOST RECENT DIASTOLIC BLOOD PRESSURE 80-89 MM HG: ICD-10-PCS | Mod: CPTII,S$GLB,, | Performed by: NURSE PRACTITIONER

## 2022-06-01 PROCEDURE — 3077F PR MOST RECENT SYSTOLIC BLOOD PRESSURE >= 140 MM HG: ICD-10-PCS | Mod: CPTII,S$GLB,, | Performed by: NURSE PRACTITIONER

## 2022-06-01 PROCEDURE — 1170F FXNL STATUS ASSESSED: CPT | Mod: CPTII,S$GLB,, | Performed by: NURSE PRACTITIONER

## 2022-06-01 PROCEDURE — 1160F PR REVIEW ALL MEDS BY PRESCRIBER/CLIN PHARMACIST DOCUMENTED: ICD-10-PCS | Mod: CPTII,S$GLB,, | Performed by: NURSE PRACTITIONER

## 2022-06-01 PROCEDURE — 3079F DIAST BP 80-89 MM HG: CPT | Mod: CPTII,S$GLB,, | Performed by: NURSE PRACTITIONER

## 2022-06-01 PROCEDURE — 3288F PR FALLS RISK ASSESSMENT DOCUMENTED: ICD-10-PCS | Mod: CPTII,S$GLB,, | Performed by: NURSE PRACTITIONER

## 2022-06-01 PROCEDURE — 99999 PR PBB SHADOW E&M-EST. PATIENT-LVL IV: CPT | Mod: PBBFAC,,, | Performed by: NURSE PRACTITIONER

## 2022-06-01 PROCEDURE — 99499 UNLISTED E&M SERVICE: CPT | Mod: S$GLB,,, | Performed by: NURSE PRACTITIONER

## 2022-06-01 PROCEDURE — 3288F FALL RISK ASSESSMENT DOCD: CPT | Mod: CPTII,S$GLB,, | Performed by: NURSE PRACTITIONER

## 2022-06-01 PROCEDURE — 1126F AMNT PAIN NOTED NONE PRSNT: CPT | Mod: CPTII,S$GLB,, | Performed by: NURSE PRACTITIONER

## 2022-06-01 PROCEDURE — 99999 PR PBB SHADOW E&M-EST. PATIENT-LVL IV: ICD-10-PCS | Mod: PBBFAC,,, | Performed by: NURSE PRACTITIONER

## 2022-06-01 PROCEDURE — 1159F MED LIST DOCD IN RCRD: CPT | Mod: CPTII,S$GLB,, | Performed by: NURSE PRACTITIONER

## 2022-06-01 PROCEDURE — 99499 RISK ADDL DX/OHS AUDIT: ICD-10-PCS | Mod: S$GLB,,, | Performed by: NURSE PRACTITIONER

## 2022-06-01 PROCEDURE — G0439 PPPS, SUBSEQ VISIT: HCPCS | Mod: S$GLB,,, | Performed by: NURSE PRACTITIONER

## 2022-06-01 PROCEDURE — 1170F PR FUNCTIONAL STATUS ASSESSED: ICD-10-PCS | Mod: CPTII,S$GLB,, | Performed by: NURSE PRACTITIONER

## 2022-06-01 PROCEDURE — 1160F RVW MEDS BY RX/DR IN RCRD: CPT | Mod: CPTII,S$GLB,, | Performed by: NURSE PRACTITIONER

## 2022-06-01 PROCEDURE — 1101F PT FALLS ASSESS-DOCD LE1/YR: CPT | Mod: CPTII,S$GLB,, | Performed by: NURSE PRACTITIONER

## 2022-06-01 NOTE — PROGRESS NOTES
"  David Amaro presented for a  Medicare AWV and comprehensive Health Risk Assessment today. The following components were reviewed and updated:    · Medical history  · Family History  · Social history  · Allergies and Current Medications  · Health Risk Assessment  · Health Maintenance  · Care Team         ** See Completed Assessments for Annual Wellness Visit within the encounter summary.**         The following assessments were completed:  · Living Situation  · CAGE  · Depression Screening  · Timed Get Up and Go  · Whisper Test  · Cognitive Function Screening  · Nutrition Screening  · ADL Screening  · PAQ Screening        Vitals:    06/01/22 1409   BP: (!) 164/82   Pulse: 60   Resp: 18   Temp: 96.9 °F (36.1 °C)   SpO2: 95%   Weight: 75.3 kg (166 lb 0.1 oz)   Height: 5' 9" (1.753 m)     Body mass index is 24.51 kg/m².  Physical Exam  Vitals and nursing note reviewed.   Constitutional:       General: He is not in acute distress.     Appearance: He is well-developed.   HENT:      Head: Normocephalic and atraumatic.   Eyes:      Pupils: Pupils are equal, round, and reactive to light.   Neck:      Vascular: No carotid bruit.   Cardiovascular:      Rate and Rhythm: Normal rate and regular rhythm.      Pulses: Normal pulses.      Heart sounds: Normal heart sounds. No murmur heard.    No gallop.   Pulmonary:      Effort: Pulmonary effort is normal.      Breath sounds: Normal breath sounds.   Abdominal:      General: Bowel sounds are normal. There is no distension.      Palpations: Abdomen is soft.      Tenderness: There is no abdominal tenderness.   Musculoskeletal:         General: Normal range of motion.   Skin:     General: Skin is warm and dry.   Neurological:      Motor: No abnormal muscle tone.   Psychiatric:         Speech: Speech normal.         Behavior: Behavior normal.         Thought Content: Thought content normal.         Judgment: Judgment normal.       Current Outpatient Medications   Medication " Instructions    doxazosin (CARDURA) 4 MG tablet TAKE 1 TABLET(4 MG) BY MOUTH EVERY EVENING    doxazosin (CARDURA) 4 mg, Oral, Nightly    ergocalciferol (ERGOCALCIFEROL) 50,000 Units, Oral, Every 7 days    gabapentin (NEURONTIN) 100 mg, Oral, Nightly    levothyroxine (SYNTHROID) 25 MCG tablet TAKE 1 TABLET(25 MCG) BY MOUTH EVERY DAY    PNEUMOVAX-23 25 mcg/0.5 mL injection No dose, route, or frequency recorded.               Diagnoses and health risks identified today and associated recommendations/orders:    1. Encounter for preventive health examination  COVID VACCINE UTD PER PT    2. Prostate cancer  Chronic and Stabl-states PSA level less 0   S/P prostate seed inplant in 2009 Continue current treatment plan as previously prescribed with your urologist- Agustin     3. Benign prostatic hyperplasia without lower urinary tract symptoms  Chronic and Stable on Cardura. Continue current treatment plan as previously prescribed with your  urolgist     4. Primary hypertension  BP elevated today- taking on Cardura  States he has been to the dentist and feeling anxious  Decline to follow up for repeat  BP check in 2w eeks  With nicholas  Instructed to darnell and record - if remains over 140 /90 - notify PCP    5. Hyperparathyroidism, secondary renal  Followed by nephrologsit     6. Acquired hypothyroidism  Chronic and Stable on Synthriod . Continue current treatment plan as previously prescribed with your PCP    7. Empty sella  Noted per MRI 2011. INCIDENTAL NOTE MADE OF HIGH RIDING RIGHT JUGULAR BULB AND PARTIALLY   EMPTY SELLA.-follwed by PCP      8. Diverticulosis of large intestine without hemorrhage  Colonoscopy   Due date: 4/3/2022   Last completion date: 4/3/2017   Frequency: Every 5 Years   Pt schedule for preop 6/21/22    9. History of colon polyps  Colonoscopy   Due date: 4/3/2022   Last completion date: 4/3/2017   Frequency: Every 5 Years   Pt schedule for preop 6/21/22    10. Anemia, unspecified type  Chronic  and Stable  Blood counts. Continue current treatment plan as previously prescribed with your PCP    11. Dyslipidemia  Chronic and Stable on diet and exericse. Continue current treatment plan as previously prescribed with your PCP     I offered to discuss advanced care planning, including how to pick a person who would make decisions for you if you were unable to make them for yourself, called a health care power of , and what kind of decisions you might make such as use of life sustaining treatments such as ventilators and tube feeding when faced with a life limiting illness recorded on a living will that they will need to know. (How you want to be cared for as you near the end of your natural life)     X  Patient has advanced directives written and agrees to provide copies to the institution.    Provided David with a 5-10 year written screening schedule and personal prevention plan. Recommendations were developed using the USPSTF age appropriate recommendations. Education, counseling, and referrals were provided as needed. After Visit Summary printed and given to patient which includes a list of additional screenings\tests needed.    Follow up in about 1 year (around 6/1/2023).    Larisa Bruner NP

## 2022-06-01 NOTE — PATIENT INSTRUCTIONS
Counseling and Referral of Other Preventative  (Italic type indicates deductible and co-insurance are waived)    Patient Name: David Amaro  Today's Date: 6/1/2022    Health Maintenance       Date Due Completion Date    Shingles Vaccine (1 of 2) 09/24/2012 7/30/2012    PROSTATE-SPECIFIC ANTIGEN 07/31/2020 7/31/2019 (Done)    Override on 7/31/2019: Done (Dr. Velez -urologist)    Override on 4/8/2008: Done    COVID-19 Vaccine (2 - Moderna series) 03/08/2021 2/8/2021    Colorectal Cancer Screening 04/03/2022 4/3/2017    Override on 3/4/2009: Done    Lipid Panel 05/18/2023 5/18/2022        No orders of the defined types were placed in this encounter.    The following information is provided to all patients.  This information is to help you find resources for any of the problems found today that may be affecting your health:                Living healthy guide: www.[facility].[place].[facility]      Understanding Diabetes: www.diabetes.org      Eating healthy: www.cdc.gov/healthyweight      CDC home safety checklist: www.cdc.gov/steadi/patient.html      Agency on Aging: www.goea.[place].gov      Alcoholics anonymous (AA): www.aa.org      Physical Activity: www.chuck.nih.gov/vh0jqqy      Tobacco use: www.quitwithusla.org

## 2022-06-21 ENCOUNTER — HOSPITAL ENCOUNTER (OUTPATIENT)
Dept: PREADMISSION TESTING | Facility: HOSPITAL | Age: 77
Discharge: HOME | End: 2022-06-21
Attending: INTERNAL MEDICINE
Payer: MEDICARE

## 2022-06-21 DIAGNOSIS — Z12.11 COLON CANCER SCREENING: Primary | ICD-10-CM

## 2022-06-21 RX ORDER — POLYETHYLENE GLYCOL 3350, SODIUM SULFATE ANHYDROUS, SODIUM BICARBONATE, SODIUM CHLORIDE, POTASSIUM CHLORIDE 236; 22.74; 6.74; 5.86; 2.97 G/4L; G/4L; G/4L; G/4L; G/4L
4 POWDER, FOR SOLUTION ORAL ONCE
Qty: 4000 ML | Refills: 0 | Status: SHIPPED | OUTPATIENT
Start: 2022-06-21 | End: 2022-06-21

## 2022-08-03 ENCOUNTER — ANESTHESIA (OUTPATIENT)
Dept: ENDOSCOPY | Facility: HOSPITAL | Age: 77
End: 2022-08-03
Payer: MEDICARE

## 2022-08-03 ENCOUNTER — ANESTHESIA EVENT (OUTPATIENT)
Dept: ENDOSCOPY | Facility: HOSPITAL | Age: 77
End: 2022-08-03
Payer: MEDICARE

## 2022-08-03 ENCOUNTER — HOSPITAL ENCOUNTER (OUTPATIENT)
Facility: HOSPITAL | Age: 77
Discharge: HOME | End: 2022-08-03
Attending: SURGERY | Admitting: SURGERY
Payer: MEDICARE

## 2022-08-03 VITALS
DIASTOLIC BLOOD PRESSURE: 87 MMHG | SYSTOLIC BLOOD PRESSURE: 143 MMHG | OXYGEN SATURATION: 98 % | RESPIRATION RATE: 18 BRPM | HEART RATE: 54 BPM | TEMPERATURE: 98 F

## 2022-08-03 DIAGNOSIS — K57.30 DIVERTICULOSIS OF LARGE INTESTINE WITHOUT HEMORRHAGE: Primary | Chronic | ICD-10-CM

## 2022-08-03 PROCEDURE — 37000009 HC ANESTHESIA EA ADD 15 MINS: Performed by: SURGERY

## 2022-08-03 PROCEDURE — 37000008 HC ANESTHESIA 1ST 15 MINUTES: Performed by: SURGERY

## 2022-08-03 PROCEDURE — 63600175 PHARM REV CODE 636 W HCPCS: Performed by: NURSE ANESTHETIST, CERTIFIED REGISTERED

## 2022-08-03 PROCEDURE — 45380 COLONOSCOPY AND BIOPSY: CPT | Mod: PT,,, | Performed by: SURGERY

## 2022-08-03 PROCEDURE — 45380 PR COLONOSCOPY,BIOPSY: ICD-10-PCS | Mod: PT,,, | Performed by: SURGERY

## 2022-08-03 PROCEDURE — 45380 COLONOSCOPY AND BIOPSY: CPT | Performed by: SURGERY

## 2022-08-03 PROCEDURE — 27201012 HC FORCEPS, HOT/COLD, DISP: Performed by: SURGERY

## 2022-08-03 RX ORDER — SODIUM CHLORIDE 9 MG/ML
INJECTION, SOLUTION INTRAVENOUS CONTINUOUS
Status: DISCONTINUED | OUTPATIENT
Start: 2022-08-03 | End: 2022-08-03 | Stop reason: HOSPADM

## 2022-08-03 RX ORDER — PROPOFOL 10 MG/ML
VIAL (ML) INTRAVENOUS
Status: DISCONTINUED | OUTPATIENT
Start: 2022-08-03 | End: 2022-08-03

## 2022-08-03 RX ADMIN — PROPOFOL 50 MG: 10 INJECTION, EMULSION INTRAVENOUS at 11:08

## 2022-08-03 RX ADMIN — SODIUM CHLORIDE, POTASSIUM CHLORIDE, SODIUM LACTATE AND CALCIUM CHLORIDE: 600; 310; 30; 20 INJECTION, SOLUTION INTRAVENOUS at 10:08

## 2022-08-03 RX ADMIN — PROPOFOL 50 MG: 10 INJECTION, EMULSION INTRAVENOUS at 10:08

## 2022-08-03 RX ADMIN — PROPOFOL 100 MG: 10 INJECTION, EMULSION INTRAVENOUS at 10:08

## 2022-08-03 NOTE — DISCHARGE SUMMARY
[facility] - Endoscopy (Hospital)  Discharge Note  Short Stay    Procedure(s) (LRB):  COLONOSCOPY (N/A)    OUTCOME: Patient tolerated treatment/procedure well without complication and is now ready for discharge.    DISPOSITION: Home or Self Care    FINAL DIAGNOSIS:  <principal problem not specified>    FOLLOWUP: With primary care provider    DISCHARGE INSTRUCTIONS:  No discharge procedures on file.      Clinical Reference Documents Added to Patient Instructions       Document    COLON POLYPECTOMY DISCHARGE INSTRUCTIONS (ENGLISH)    DIVERTICULOSIS DISCHARGE INSTRUCTIONS (ENGLISH)    HEMORRHOIDS DISCHARGE INSTRUCTIONS (ENGLISH)          TIME SPENT ON DISCHARGE: 5 minutes

## 2022-08-03 NOTE — ANESTHESIA PREPROCEDURE EVALUATION
08/03/2022  David Amaro is a 77 y.o., male.    Pre-op Assessment    I have reviewed the Patient Summary Reports.    I have reviewed the Nursing Notes.    I have reviewed the Medications.     Review of Systems  Anesthesia Hx:  No problems with previous Anesthesia  Denies Family Hx of Anesthesia complications.   Denies Personal Hx of Anesthesia complications.   Social:  Non-Smoker, No Alcohol Use    Hematology/Oncology:  Hematology Normal      Oncology Comments: Prostate ca    Cardiovascular:   Hypertension Denies MI.   Denies CABG/stent.   Denies CHF. hyperlipidemia ECG has been reviewed. Echo 7/2014:  1 - Normal left ventricular systolic function (EF 55-60%).     2 - Normal left ventricular diastolic function.     3 - Normal right ventricular systolic function .     4 - Trivial to mild pulmonic regurgitation   Pulmonary:   Denies COPD.  Denies Asthma.  Denies Sleep Apnea.    Renal/:  Renal/ Normal     Hepatic/GI:   Bowel Prep. Denies GERD. Denies Liver Disease.  Denies Hepatitis.    Musculoskeletal:  Musculoskeletal Normal    Neurological:   Denies CVA. Denies Seizures.    Endocrine:   Denies Diabetes. Hypothyroidism Denies Hyperthyroidism.        Physical Exam  General:  Well nourished      Airway/Jaw/Neck:  Airway Findings: Mouth Opening: Normal   Tongue: Normal   General Airway Assessment: Adult Mallampati: II      Dental:  Dental Findings: In tact     Chest/Lungs:  Chest/Lungs Findings: Clear to auscultation, Normal Respiratory Rate      Heart/Vascular:  Heart Findings: Rate: Normal  Rhythm: Regular Rhythm  Sounds: Normal             Anesthesia Plan  Type of Anesthesia, risks & benefits discussed:  Anesthesia Type:  MAC    Patient's Preference:   Plan Factors:          Intra-op Monitoring Plan: standard ASA monitors  Intra-op Monitoring Plan Comments:   Post Op Pain Control Plan:   Post Op Pain  Control Plan Comments:     Induction:   IV  Beta Blocker:  Patient is not currently on a Beta-Blocker (No further documentation required).       Informed Consent: Informed consent signed with the Patient and all parties understand the risks and agree with anesthesia plan.  All questions answered.  Anesthesia consent signed with patient.  ASA Score: 3     Day of Surgery Review of History & Physical: I have interviewed and examined the patient. I have reviewed the patient's H&P dated:  There are no significant changes.            Ready For Surgery From Anesthesia Perspective.           Physical Exam  General: Well nourished    Airway:  Mallampati: II   Mouth Opening: Normal  Tongue: Normal    Dental:  In tact    Chest/Lungs:  Clear to auscultation, Normal Respiratory Rate    Heart:  Rate: Normal  Rhythm: Regular Rhythm  Sounds: Normal          Anesthesia Plan  Type of Anesthesia, risks & benefits discussed:    Anesthesia Type: MAC  Intra-op Monitoring Plan: standard ASA monitors  Induction:  IV  Informed Consent: Informed consent signed with the Patient and all parties understand the risks and agree with anesthesia plan.  All questions answered.   ASA Score: 3  Day of Surgery Review of History & Physical: I have interviewed and examined the patient. I have reviewed the patient's H&P dated:     Ready For Surgery From Anesthesia Perspective.       .

## 2022-08-03 NOTE — PROVATION PATIENT INSTRUCTIONS
Discharge Summary/Instructions after an Endoscopic Procedure  Patient Name: David Amaro  Patient MRN: [id number]  Patient YOB: 1945  Wednesday, August 3, 2022 Ramila Burgess DO  Dear patient,  As a result of recent federal legislation (The Federal Cures Act), you may   receive lab or pathology results from your procedure in your [facility]   account before your physician is able to contact you. Your physician or   their representative will relay the results to you with their   recommendations at their soonest availability.  Thank you,  RESTRICTIONS:  During your procedure today, you received medications for sedation.  These   medications may affect your judgment, balance and coordination.  Therefore,   for 24 hours, you have the following restrictions:   - DO NOT drive a car, operate machinery, make legal/financial decisions,   sign important papers or drink alcohol.    ACTIVITY:  Today: no heavy lifting, straining or running due to procedural   sedation/anesthesia.  The following day: return to full activity including work.  DIET:  Eat and drink normally unless instructed otherwise.     TREATMENT FOR COMMON SIDE EFFECTS:  - Mild abdominal pain, nausea, belching, bloating or excessive gas:  rest,   eat lightly and use a heating pad.  - Sore Throat: treat with throat lozenges and/or gargle with warm salt   water.  - Because air was used during the procedure, expelling large amounts of air   from your rectum or belching is normal.  - If a bowel prep was taken, you may not have a bowel movement for 1-3 days.    This is normal.  SYMPTOMS TO WATCH FOR AND REPORT TO YOUR PHYSICIAN:  1. Abdominal pain or bloating, other than gas cramps.  2. Chest pain.  3. Back pain.  4. Signs of infection such as: chills or fever occurring within 24 hours   after the procedure.  5. Rectal bleeding, which would show as bright red, maroon, or black stools.   (A tablespoon of blood from the rectum is not serious, especially  if   hemorrhoids are present.)  6. Vomiting.  7. Weakness or dizziness.  GO DIRECTLY TO THE NEAREST EMERGENCY ROOM IF YOU HAVE ANY OF THE FOLLOWING:      Difficulty breathing              Chills and/or fever over 101 F   Persistent vomiting and/or vomiting blood   Severe abdominal pain   Severe chest pain   Black, tarry stools   Bleeding- more than one tablespoon   Any other symptom or condition that you feel may need urgent attention  Your doctor recommends these additional instructions:  If any biopsies were taken, your doctors clinic will contact you in 1 to 2   weeks with any results.  - Patient has a contact number available for emergencies.  The signs and   symptoms of potential delayed complications were discussed with the   patient.  Return to normal activities tomorrow.  Written discharge   instructions were provided to the patient.   - Resume previous diet.   - Discharge patient to home (ambulatory).   - Continue present medications.   - Repeat colonoscopy in 3 years for surveillance.   - Return to GI clinic PRN.  For questions, problems or results please call your physician Ramila Burgess, DO at Work:  [phone]  If you have any questions about the above instructions, call the GI   department at [phone] or call the endoscopy unit at [phone]   from 7am until 3 pm.  [facility] [place], EMERGENCY ROOM PHONE NUMBER:   [phone]  IF A COMPLICATION OR EMERGENCY SITUATION ARISES AND YOU ARE UNABLE TO REACH   YOUR PHYSICIAN - GO DIRECTLY TO THE EMERGENCY ROOM.  I have read or have had read to me these discharge instructions for my   procedure and have received a written copy.  I understand these   instructions and will follow-up with my physician if I have any questions.     __________________________________       _____________________________________  Nurse Signature                                          Patient/Designated   Responsible Party Signature  Ramila HERNANDEZ  DO Aditya  8/3/2022 11:20:49 AM  This report has been verified and signed electronically.  Dear patient,  As a result of recent federal legislation (The Federal Cures Act), you may   receive lab or pathology results from your procedure in your [facility]   account before your physician is able to contact you. Your physician or   their representative will relay the results to you with their   recommendations at their soonest availability.  Thank you,  PROVATION

## 2022-08-03 NOTE — ANESTHESIA POSTPROCEDURE EVALUATION
Anesthesia Post Evaluation    Patient: David Amaro    Procedure(s) Performed: Procedure(s) (LRB):  COLONOSCOPY (N/A)    Final Anesthesia Type: MAC      Patient location during evaluation: GI PACU  Patient participation: Yes- Able to Participate  Level of consciousness: awake and alert and awake  Post-procedure vital signs: reviewed and stable  Pain management: adequate  Airway patency: patent  DOMENICA mitigation strategies: Multimodal analgesia  PONV status at discharge: No PONV  Anesthetic complications: no      Cardiovascular status: blood pressure returned to baseline and hemodynamically stable  Respiratory status: unassisted and spontaneous ventilation  Hydration status: euvolemic  Follow-up not needed.          Vitals Value Taken Time   /69 08/03/22 1118   Temp  08/03/22 1122   Pulse 65 08/03/22 1118   Resp 17 08/03/22 1118   SpO2 96 % 08/03/22 1118         No case tracking events are documented in the log.      Pain/Joy Score: Joy Score: 9 (8/3/2022 11:21 AM)

## 2022-08-03 NOTE — H&P
PRE PROCEDURE H&P    Patient Name: David Amaro  MRN: [id number]  : 1945  Date of Procedure:  8/3/2022  Referring Physician: Ramila Burgess DO  Primary Physician: Stepan Pierre MD  Procedure Physician: Ramila Burgess DO      Planned Procedure: Colonoscopy  Diagnosis: previous adenomatous polyp  family history of colon cancer  Chief Complaint: Same as above    HPI: Patient is an 77 y.o. male is here for the above.     Last colonoscopy:   Family history: Mother had colon cancer  Anticoagulation: None    Past Medical History:   Past Medical History:   Diagnosis Date    Arthritis     Bilateral low back pain 2021    Cancer     prostate    CKD (chronic kidney disease)     Dyslipidemia     HTN (hypertension)     Hyperlipidemia     Hypothyroidism     Prostate cancer     Radiation induced proctitis     Thyroid disease         Past Surgical History:  Past Surgical History:   Procedure Laterality Date    COLONOSCOPY N/A 4/3/2017    Procedure: COLONOSCOPY;  Surgeon: Olivier Grayson MD;  Location: [facility];  Service: Endoscopy;  Laterality: N/A;    HERNIA REPAIR   approx    seed implants in prostate  2009    TONSILLECTOMY          Home Medications:  Prior to Admission medications    Medication Sig Start Date End Date Taking? Authorizing Provider   doxazosin (CARDURA) 4 MG tablet TAKE 1 TABLET(4 MG) BY MOUTH EVERY EVENING 21  Yes Stepan Pierre MD   doxazosin (CARDURA) 4 MG tablet TAKE 1 TABLET(4 MG) BY MOUTH EVERY EVENING 22  Yes Stepan Pierre MD   ergocalciferol (ERGOCALCIFEROL) 50,000 unit Cap TAKE 1 CAPSULE BY MOUTH EVERY 7 DAYS 22  Yes Stepan Pierre MD   levothyroxine (SYNTHROID) 25 MCG tablet TAKE 1 TABLET(25 MCG) BY MOUTH EVERY DAY 21  Yes Stepan Pierre MD   gabapentin (NEURONTIN) 100 MG capsule Take 1 capsule (100 mg total) by mouth every evening. 21  Stepan Pierre MD   PNEUMOVAX-23  25 mcg/0.5 mL injection  4/14/21   Historical Provider        Allergies:  Review of patient's allergies indicates:   Allergen Reactions    No known drug allergies         Social History:   Social History     Socioeconomic History    Marital status:    Tobacco Use    Smoking status: Never Smoker    Smokeless tobacco: Never Used   Substance and Sexual Activity    Alcohol use: No    Drug use: No    Sexual activity: Yes     Partners: Female     Social Determinants of Health     Financial Resource Strain: Low Risk     Difficulty of Paying Living Expenses: Not hard at all   Food Insecurity: No Food Insecurity    Worried About Running Out of Food in the Last Year: Never true    Ran Out of Food in the Last Year: Never true   Transportation Needs: No Transportation Needs    Lack of Transportation (Medical): No    Lack of Transportation (Non-Medical): No   Physical Activity: Insufficiently Active    Days of Exercise per Week: 1 day    Minutes of Exercise per Session: 20 min   Stress: No Stress Concern Present    Feeling of Stress : Not at all   Social Connections: Unknown    Frequency of Communication with Friends and Family: Three times a week    Frequency of Social Gatherings with Friends and Family: Once a week    Active Member of Clubs or Organizations: No    Attends Club or Organization Meetings: Never    Marital Status:    Housing Stability: Low Risk     Unable to Pay for Housing in the Last Year: No    Number of Places Lived in the Last Year: 1    Unstable Housing in the Last Year: No       Family History:  Family History   Problem Relation Age of Onset    Stroke Mother     Cancer Mother     Colon cancer Mother     Diabetes Father     Cancer Sister         lung    Cancer Brother     Bone cancer Brother     Heart disease Maternal Grandmother        ROS: No acute cardiac events, no acute respiratory complaints.     Physical Exam (all patients):    BP (!) 147/77 (BP Location:  Left arm, Patient Position: Lying)   Pulse 83   Temp 97.9 °F (36.6 °C)   Resp 18   SpO2 99%   Lungs: Clear to auscultation bilaterally, respirations unlabored  Heart: Regular rate and rhythm, S1 and S2 normal, no obvious murmurs  Abdomen:         Soft, non-tender, bowel sounds normal, no masses, no organomegaly    Lab Results   Component Value Date    WBC 5.37 01/14/2022    MCV 96 01/14/2022    RDW 12.6 01/14/2022     01/14/2022     01/14/2022    HGBA1C 5.7 12/20/2005    BUN 18 01/14/2022     01/14/2022    K 3.8 01/14/2022     01/14/2022        SEDATION PLAN: per anesthesia      History reviewed, vital signs satisfactory, cardiopulmonary status satisfactory, sedation options, risks and plans have been discussed with the patient  All their questions were answered and the patient agrees to the sedation procedures as planned and the patient is deemed an appropriate candidate for the sedation as planned.    Procedure explained to patient, informed consent obtained and placed in chart.    Ramila Burgess, DO  General Surgery  [facility]  8/3/2022

## 2022-08-03 NOTE — TRANSFER OF CARE
Anesthesia Transfer of Care Note    Patient: David Amaro    Procedure(s) Performed: Procedure(s) (LRB):  COLONOSCOPY (N/A)    Patient location: GI    Anesthesia Type: MAC    Transport from OR: Transported from OR on room air with adequate spontaneous ventilation    Post pain: adequate analgesia    Post assessment: no apparent anesthetic complications and tolerated procedure well    Post vital signs: stable    Level of consciousness: awake and alert    Nausea/Vomiting: no nausea/vomiting    Complications: none    Transfer of care protocol was followed      Last vitals:   Visit Vitals  /69   Pulse 65   Temp 36.6 °C (97.9 °F)   Resp 17   SpO2 96%

## 2022-09-21 ENCOUNTER — OFFICE VISIT (OUTPATIENT)
Dept: FAMILY MEDICINE | Facility: CLINIC | Age: 77
End: 2022-09-21
Payer: MEDICARE

## 2022-09-21 ENCOUNTER — LAB VISIT (OUTPATIENT)
Dept: LAB | Facility: HOSPITAL | Age: 77
End: 2022-09-21
Attending: INTERNAL MEDICINE
Payer: MEDICARE

## 2022-09-21 VITALS
BODY MASS INDEX: 24.82 KG/M2 | RESPIRATION RATE: 18 BRPM | SYSTOLIC BLOOD PRESSURE: 134 MMHG | WEIGHT: 167.56 LBS | OXYGEN SATURATION: 97 % | HEIGHT: 69 IN | HEART RATE: 68 BPM | DIASTOLIC BLOOD PRESSURE: 82 MMHG | TEMPERATURE: 98 F

## 2022-09-21 DIAGNOSIS — C61 PROSTATE CANCER: Primary | ICD-10-CM

## 2022-09-21 DIAGNOSIS — I10 PRIMARY HYPERTENSION: ICD-10-CM

## 2022-09-21 DIAGNOSIS — E03.9 ACQUIRED HYPOTHYROIDISM: ICD-10-CM

## 2022-09-21 DIAGNOSIS — E78.5 DYSLIPIDEMIA: ICD-10-CM

## 2022-09-21 DIAGNOSIS — D64.9 ANEMIA, UNSPECIFIED TYPE: ICD-10-CM

## 2022-09-21 LAB
ALBUMIN SERPL BCP-MCNC: 3.9 G/DL (ref 3.5–5.2)
ALP SERPL-CCNC: 74 U/L (ref 55–135)
ALT SERPL W/O P-5'-P-CCNC: 24 U/L (ref 10–44)
ANION GAP SERPL CALC-SCNC: 8 MMOL/L (ref 8–16)
AST SERPL-CCNC: 27 U/L (ref 10–40)
BASOPHILS # BLD AUTO: 0.06 K/UL (ref 0–0.2)
BASOPHILS NFR BLD: 1 % (ref 0–1.9)
BILIRUB SERPL-MCNC: 0.6 MG/DL (ref 0.1–1)
BUN SERPL-MCNC: 22 MG/DL (ref 8–23)
CALCIUM SERPL-MCNC: 8.5 MG/DL (ref 8.7–10.5)
CHLORIDE SERPL-SCNC: 108 MMOL/L (ref 95–110)
CO2 SERPL-SCNC: 23 MMOL/L (ref 23–29)
CREAT SERPL-MCNC: 1.3 MG/DL (ref 0.5–1.4)
DIFFERENTIAL METHOD: ABNORMAL
EOSINOPHIL # BLD AUTO: 0 K/UL (ref 0–0.5)
EOSINOPHIL NFR BLD: 0.7 % (ref 0–8)
ERYTHROCYTE [DISTWIDTH] IN BLOOD BY AUTOMATED COUNT: 12.9 % (ref 11.5–14.5)
EST. GFR  (NO RACE VARIABLE): 56.9 ML/MIN/1.73 M^2
GLUCOSE SERPL-MCNC: 83 MG/DL (ref 70–110)
HCT VFR BLD AUTO: 40.7 % (ref 40–54)
HGB BLD-MCNC: 12.1 G/DL (ref 14–18)
IMM GRANULOCYTES # BLD AUTO: 0.01 K/UL (ref 0–0.04)
IMM GRANULOCYTES NFR BLD AUTO: 0.2 % (ref 0–0.5)
LYMPHOCYTES # BLD AUTO: 1.6 K/UL (ref 1–4.8)
LYMPHOCYTES NFR BLD: 26.6 % (ref 18–48)
MCH RBC QN AUTO: 30 PG (ref 27–31)
MCHC RBC AUTO-ENTMCNC: 29.7 G/DL (ref 32–36)
MCV RBC AUTO: 101 FL (ref 82–98)
MONOCYTES # BLD AUTO: 0.5 K/UL (ref 0.3–1)
MONOCYTES NFR BLD: 8.5 % (ref 4–15)
NEUTROPHILS # BLD AUTO: 3.7 K/UL (ref 1.8–7.7)
NEUTROPHILS NFR BLD: 63 % (ref 38–73)
NRBC BLD-RTO: 0 /100 WBC
PLATELET # BLD AUTO: 185 K/UL (ref 150–450)
PMV BLD AUTO: 10.3 FL (ref 9.2–12.9)
POTASSIUM SERPL-SCNC: 4.2 MMOL/L (ref 3.5–5.1)
PROT SERPL-MCNC: 6.2 G/DL (ref 6–8.4)
RBC # BLD AUTO: 4.03 M/UL (ref 4.6–6.2)
SODIUM SERPL-SCNC: 139 MMOL/L (ref 136–145)
WBC # BLD AUTO: 5.91 K/UL (ref 3.9–12.7)

## 2022-09-21 PROCEDURE — 99999 PR PBB SHADOW E&M-EST. PATIENT-LVL III: ICD-10-PCS | Mod: PBBFAC,,, | Performed by: INTERNAL MEDICINE

## 2022-09-21 PROCEDURE — 36415 COLL VENOUS BLD VENIPUNCTURE: CPT | Mod: PO | Performed by: INTERNAL MEDICINE

## 2022-09-21 PROCEDURE — 99999 PR PBB SHADOW E&M-EST. PATIENT-LVL III: CPT | Mod: PBBFAC,,, | Performed by: INTERNAL MEDICINE

## 2022-09-21 PROCEDURE — G0008 FLU VACCINE - QUADRIVALENT - ADJUVANTED: ICD-10-PCS | Mod: S$GLB,,, | Performed by: INTERNAL MEDICINE

## 2022-09-21 PROCEDURE — 3288F FALL RISK ASSESSMENT DOCD: CPT | Mod: CPTII,S$GLB,, | Performed by: INTERNAL MEDICINE

## 2022-09-21 PROCEDURE — G0008 ADMIN INFLUENZA VIRUS VAC: HCPCS | Mod: S$GLB,,, | Performed by: INTERNAL MEDICINE

## 2022-09-21 PROCEDURE — 3288F PR FALLS RISK ASSESSMENT DOCUMENTED: ICD-10-PCS | Mod: CPTII,S$GLB,, | Performed by: INTERNAL MEDICINE

## 2022-09-21 PROCEDURE — 1101F PR PT FALLS ASSESS DOC 0-1 FALLS W/OUT INJ PAST YR: ICD-10-PCS | Mod: CPTII,S$GLB,, | Performed by: INTERNAL MEDICINE

## 2022-09-21 PROCEDURE — 3079F PR MOST RECENT DIASTOLIC BLOOD PRESSURE 80-89 MM HG: ICD-10-PCS | Mod: CPTII,S$GLB,, | Performed by: INTERNAL MEDICINE

## 2022-09-21 PROCEDURE — 90694 FLU VACCINE - QUADRIVALENT - ADJUVANTED: ICD-10-PCS | Mod: S$GLB,,, | Performed by: INTERNAL MEDICINE

## 2022-09-21 PROCEDURE — 99214 OFFICE O/P EST MOD 30 MIN: CPT | Mod: 25,S$GLB,, | Performed by: INTERNAL MEDICINE

## 2022-09-21 PROCEDURE — 3075F SYST BP GE 130 - 139MM HG: CPT | Mod: CPTII,S$GLB,, | Performed by: INTERNAL MEDICINE

## 2022-09-21 PROCEDURE — 90694 VACC AIIV4 NO PRSRV 0.5ML IM: CPT | Mod: S$GLB,,, | Performed by: INTERNAL MEDICINE

## 2022-09-21 PROCEDURE — 99214 PR OFFICE/OUTPT VISIT, EST, LEVL IV, 30-39 MIN: ICD-10-PCS | Mod: 25,S$GLB,, | Performed by: INTERNAL MEDICINE

## 2022-09-21 PROCEDURE — 99499 UNLISTED E&M SERVICE: CPT | Mod: S$GLB,,, | Performed by: INTERNAL MEDICINE

## 2022-09-21 PROCEDURE — 85025 COMPLETE CBC W/AUTO DIFF WBC: CPT | Performed by: INTERNAL MEDICINE

## 2022-09-21 PROCEDURE — 3079F DIAST BP 80-89 MM HG: CPT | Mod: CPTII,S$GLB,, | Performed by: INTERNAL MEDICINE

## 2022-09-21 PROCEDURE — 1126F PR PAIN SEVERITY QUANTIFIED, NO PAIN PRESENT: ICD-10-PCS | Mod: CPTII,S$GLB,, | Performed by: INTERNAL MEDICINE

## 2022-09-21 PROCEDURE — 1159F MED LIST DOCD IN RCRD: CPT | Mod: CPTII,S$GLB,, | Performed by: INTERNAL MEDICINE

## 2022-09-21 PROCEDURE — 1126F AMNT PAIN NOTED NONE PRSNT: CPT | Mod: CPTII,S$GLB,, | Performed by: INTERNAL MEDICINE

## 2022-09-21 PROCEDURE — 80053 COMPREHEN METABOLIC PANEL: CPT | Performed by: INTERNAL MEDICINE

## 2022-09-21 PROCEDURE — 1101F PT FALLS ASSESS-DOCD LE1/YR: CPT | Mod: CPTII,S$GLB,, | Performed by: INTERNAL MEDICINE

## 2022-09-21 PROCEDURE — 1159F PR MEDICATION LIST DOCUMENTED IN MEDICAL RECORD: ICD-10-PCS | Mod: CPTII,S$GLB,, | Performed by: INTERNAL MEDICINE

## 2022-09-21 PROCEDURE — 3075F PR MOST RECENT SYSTOLIC BLOOD PRESS GE 130-139MM HG: ICD-10-PCS | Mod: CPTII,S$GLB,, | Performed by: INTERNAL MEDICINE

## 2022-09-21 RX ORDER — SILDENAFIL 50 MG/1
50 TABLET, FILM COATED ORAL DAILY PRN
Qty: 3 TABLET | Refills: 3 | Status: SHIPPED | OUTPATIENT
Start: 2022-09-21 | End: 2023-11-29

## 2022-09-21 NOTE — PROGRESS NOTES
Subjective:       Patient ID: David Amaro is a 77 y.o. male.    Chief Complaint: Follow-up, Hypertension, Hyperlipidemia, and Anemia    Follow-up  Associated symptoms include arthralgias. Pertinent negatives include no abdominal pain, chest pain, chills, coughing, diaphoresis, fatigue, fever, headaches, joint swelling, myalgias, nausea, neck pain, numbness, rash, sore throat, vomiting or weakness.   Hypertension  Pertinent negatives include no chest pain, headaches, neck pain, palpitations or shortness of breath.   Hyperlipidemia  Pertinent negatives include no chest pain, myalgias or shortness of breath.   Anemia  There has been no abdominal pain, bruising/bleeding easily, confusion, fever, light-headedness, pallor or palpitations.   Past Medical History:   Diagnosis Date    Arthritis     Bilateral low back pain 9/2/2021    Cancer     prostate    CKD (chronic kidney disease)     Dyslipidemia     HTN (hypertension)     Hyperlipidemia     Hypothyroidism     Prostate cancer     Radiation induced proctitis     Thyroid disease      Past Surgical History:   Procedure Laterality Date    COLONOSCOPY N/A 4/3/2017    Procedure: COLONOSCOPY;  Surgeon: Olivier Grayson MD;  Location: [facility] ENDO;  Service: Endoscopy;  Laterality: N/A;    COLONOSCOPY N/A 8/3/2022    Procedure: COLONOSCOPY;  Surgeon: Ramila Burgess DO;  Location: [facility] ENDO;  Service: General;  Laterality: N/A;    HERNIA REPAIR  2009 approx    seed implants in prostate  02/2009    TONSILLECTOMY       Family History   Problem Relation Age of Onset    Stroke Mother     Cancer Mother     Colon cancer Mother     Diabetes Father     Cancer Sister         lung    Cancer Brother     Bone cancer Brother     Heart disease Maternal Grandmother      Social History     Socioeconomic History    Marital status:    Tobacco Use    Smoking status: Never    Smokeless tobacco: Never   Substance and Sexual Activity    Alcohol use: No    Drug use: No    Sexual activity:  Yes     Partners: Female     Social Determinants of Health     Financial Resource Strain: Low Risk     Difficulty of Paying Living Expenses: Not hard at all   Food Insecurity: No Food Insecurity    Worried About Running Out of Food in the Last Year: Never true    Ran Out of Food in the Last Year: Never true   Transportation Needs: No Transportation Needs    Lack of Transportation (Medical): No    Lack of Transportation (Non-Medical): No   Physical Activity: Insufficiently Active    Days of Exercise per Week: 1 day    Minutes of Exercise per Session: 20 min   Stress: No Stress Concern Present    Feeling of Stress : Not at all   Social Connections: Unknown    Frequency of Communication with Friends and Family: Three times a week    Frequency of Social Gatherings with Friends and Family: Once a week    Active Member of Clubs or Organizations: No    Attends Club or Organization Meetings: Never    Marital Status:    Housing Stability: Low Risk     Unable to Pay for Housing in the Last Year: No    Number of Places Lived in the Last Year: 1    Unstable Housing in the Last Year: No     Review of Systems   Constitutional:  Negative for activity change, appetite change, chills, diaphoresis, fatigue, fever and unexpected weight change.   HENT:  Negative for drooling, ear discharge, ear pain, facial swelling, hearing loss, mouth sores, nosebleeds, postnasal drip, rhinorrhea, sinus pressure, sneezing, sore throat, tinnitus, trouble swallowing and voice change.    Eyes:  Negative for photophobia, redness and visual disturbance.   Respiratory:  Negative for apnea, cough, choking, chest tightness, shortness of breath and wheezing.    Cardiovascular:  Negative for chest pain, palpitations and leg swelling.   Gastrointestinal:  Negative for abdominal distention, abdominal pain, anal bleeding, blood in stool, constipation, diarrhea, nausea and vomiting.   Endocrine: Negative for cold intolerance, heat intolerance, polydipsia,  polyphagia and polyuria.   Genitourinary:  Negative for difficulty urinating, dysuria, enuresis, flank pain, frequency, genital sores, hematuria and urgency.   Musculoskeletal:  Positive for arthralgias. Negative for back pain, gait problem, joint swelling, myalgias, neck pain and neck stiffness.   Skin:  Negative for color change, pallor, rash and wound.   Allergic/Immunologic: Negative for food allergies and immunocompromised state.   Neurological:  Negative for dizziness, tremors, seizures, syncope, facial asymmetry, speech difficulty, weakness, light-headedness, numbness and headaches.   Hematological:  Negative for adenopathy. Does not bruise/bleed easily.   Psychiatric/Behavioral:  Negative for agitation, behavioral problems, confusion, decreased concentration, dysphoric mood, hallucinations, self-injury, sleep disturbance and suicidal ideas. The patient is not nervous/anxious and is not hyperactive.      Objective:      Physical Exam  Vitals and nursing note reviewed.   Constitutional:       General: He is not in acute distress.     Appearance: Normal appearance. He is well-developed. He is not diaphoretic.   HENT:      Head: Normocephalic and atraumatic.      Mouth/Throat:      Pharynx: No oropharyngeal exudate.   Eyes:      General: No scleral icterus.     Pupils: Pupils are equal, round, and reactive to light.   Neck:      Thyroid: No thyromegaly.      Vascular: No carotid bruit or JVD.      Trachea: No tracheal deviation.   Cardiovascular:      Rate and Rhythm: Normal rate and regular rhythm.      Heart sounds: Normal heart sounds.   Pulmonary:      Effort: Pulmonary effort is normal. No respiratory distress.      Breath sounds: Normal breath sounds. No wheezing or rales.   Chest:      Chest wall: No tenderness.   Abdominal:      General: Bowel sounds are normal. There is no distension.      Palpations: Abdomen is soft.      Tenderness: There is no abdominal tenderness. There is no guarding or rebound.    Musculoskeletal:         General: No tenderness. Normal range of motion.      Cervical back: Normal range of motion and neck supple.   Lymphadenopathy:      Cervical: No cervical adenopathy.   Skin:     General: Skin is warm and dry.      Coloration: Skin is not pale.      Findings: No erythema or rash.   Neurological:      Mental Status: He is alert and oriented to person, place, and time.   Psychiatric:         Behavior: Behavior normal.         Thought Content: Thought content normal.         Judgment: Judgment normal.       CMP  Sodium   Date Value Ref Range Status   01/14/2022 138 136 - 145 mmol/L Final     Potassium   Date Value Ref Range Status   01/14/2022 3.8 3.5 - 5.1 mmol/L Final     Chloride   Date Value Ref Range Status   01/14/2022 105 95 - 110 mmol/L Final     CO2   Date Value Ref Range Status   01/14/2022 26 23 - 29 mmol/L Final     Glucose   Date Value Ref Range Status   01/14/2022 103 70 - 110 mg/dL Final     BUN   Date Value Ref Range Status   01/14/2022 18 8 - 23 mg/dL Final     Creatinine   Date Value Ref Range Status   01/14/2022 1.3 0.5 - 1.4 mg/dL Final     Calcium   Date Value Ref Range Status   01/14/2022 8.9 8.7 - 10.5 mg/dL Final     Total Protein   Date Value Ref Range Status   09/15/2021 6.8 6.0 - 8.4 g/dL Final     Albumin   Date Value Ref Range Status   01/14/2022 3.5 3.5 - 5.2 g/dL Final     Total Bilirubin   Date Value Ref Range Status   09/15/2021 0.6 0.1 - 1.0 mg/dL Final     Comment:     For infants and newborns, interpretation of results should be based  on gestational age, weight and in agreement with clinical  observations.    Premature Infant recommended reference ranges:  Up to 24 hours.............<8.0 mg/dL  Up to 48 hours............<12.0 mg/dL  3-5 days..................<15.0 mg/dL  6-29 days.................<15.0 mg/dL       Alkaline Phosphatase   Date Value Ref Range Status   09/15/2021 76 55 - 135 U/L Final     AST   Date Value Ref Range Status   09/15/2021 24 10 -  40 U/L Final     ALT   Date Value Ref Range Status   09/15/2021 25 10 - 44 U/L Final     Anion Gap   Date Value Ref Range Status   01/14/2022 7 (L) 8 - 16 mmol/L Final     eGFR if    Date Value Ref Range Status   01/14/2022 >60.0 >60 mL/min/1.73 m^2 Final     eGFR if non    Date Value Ref Range Status   01/14/2022 53.4 (A) >60 mL/min/1.73 m^2 Final     Comment:     Calculation used to obtain the estimated glomerular filtration  rate (eGFR) is the CKD-EPI equation.        Lab Results   Component Value Date    WBC 5.37 01/14/2022    HGB 12.4 (L) 01/14/2022    HCT 38.7 (L) 01/14/2022    MCV 96 01/14/2022     01/14/2022     Lab Results   Component Value Date    CHOL 192 05/18/2022     Lab Results   Component Value Date    HDL 77 (H) 05/18/2022     Lab Results   Component Value Date    LDLCALC 109.8 05/18/2022     Lab Results   Component Value Date    TRIG 26 (L) 05/18/2022     Lab Results   Component Value Date    CHOLHDL 40.1 05/18/2022     Lab Results   Component Value Date    TSH 2.194 05/18/2022     Lab Results   Component Value Date    HGBA1C 5.7 12/20/2005     Assessment:       1. Prostate cancer    2. Acquired hypothyroidism    3. Primary hypertension    4. Dyslipidemia    5. Anemia, unspecified type          Plan:   Prostate cancer-------sees urology dr delvalle-----    Acquired hypothyroidism    Primary hypertension  -     Comprehensive Metabolic Panel; Future; Expected date: 09/21/2022  -     CBC Auto Differential; Future; Expected date: 09/21/2022    Dyslipidemia    Anemia, unspecified type    Other orders  -     sildenafiL (VIAGRA) 50 MG tablet; Take 1 tablet (50 mg total) by mouth daily as needed for Erectile Dysfunction.  Dispense: 3 tablet; Refill: 3      Stable----------continue meds-----sees cards dr cunningham.    As above-------------f/u 6 months------

## 2023-02-22 ENCOUNTER — LAB VISIT (OUTPATIENT)
Dept: LAB | Facility: HOSPITAL | Age: 78
End: 2023-02-22
Attending: INTERNAL MEDICINE
Payer: MEDICARE

## 2023-02-22 DIAGNOSIS — I12.9 STAGE 2 CHRONIC KIDNEY DISEASE DUE TO BENIGN HYPERTENSION: ICD-10-CM

## 2023-02-22 DIAGNOSIS — N18.2 STAGE 2 CHRONIC KIDNEY DISEASE DUE TO BENIGN HYPERTENSION: ICD-10-CM

## 2023-02-22 PROCEDURE — 36415 COLL VENOUS BLD VENIPUNCTURE: CPT | Mod: PO | Performed by: INTERNAL MEDICINE

## 2023-02-22 PROCEDURE — 80069 RENAL FUNCTION PANEL: CPT | Performed by: INTERNAL MEDICINE

## 2023-02-23 LAB
ALBUMIN SERPL BCP-MCNC: 3.6 G/DL (ref 3.5–5.2)
ANION GAP SERPL CALC-SCNC: 9 MMOL/L (ref 8–16)
BUN SERPL-MCNC: 25 MG/DL (ref 8–23)
CALCIUM SERPL-MCNC: 9 MG/DL (ref 8.7–10.5)
CHLORIDE SERPL-SCNC: 109 MMOL/L (ref 95–110)
CO2 SERPL-SCNC: 22 MMOL/L (ref 23–29)
CREAT SERPL-MCNC: 1.2 MG/DL (ref 0.5–1.4)
EST. GFR  (NO RACE VARIABLE): >60 ML/MIN/1.73 M^2
GLUCOSE SERPL-MCNC: 81 MG/DL (ref 70–110)
PHOSPHATE SERPL-MCNC: 2.9 MG/DL (ref 2.7–4.5)
POTASSIUM SERPL-SCNC: 4 MMOL/L (ref 3.5–5.1)
SODIUM SERPL-SCNC: 140 MMOL/L (ref 136–145)

## 2023-03-01 ENCOUNTER — OFFICE VISIT (OUTPATIENT)
Dept: NEPHROLOGY | Facility: CLINIC | Age: 78
End: 2023-03-01
Payer: MEDICARE

## 2023-03-01 VITALS
SYSTOLIC BLOOD PRESSURE: 154 MMHG | HEART RATE: 63 BPM | BODY MASS INDEX: 24.78 KG/M2 | DIASTOLIC BLOOD PRESSURE: 84 MMHG | RESPIRATION RATE: 20 BRPM | HEIGHT: 69 IN | WEIGHT: 167.31 LBS

## 2023-03-01 DIAGNOSIS — I10 PRIMARY HYPERTENSION: Primary | ICD-10-CM

## 2023-03-01 PROCEDURE — 1159F PR MEDICATION LIST DOCUMENTED IN MEDICAL RECORD: ICD-10-PCS | Mod: CPTII,S$GLB,, | Performed by: INTERNAL MEDICINE

## 2023-03-01 PROCEDURE — 99999 PR PBB SHADOW E&M-EST. PATIENT-LVL III: CPT | Mod: PBBFAC,,, | Performed by: INTERNAL MEDICINE

## 2023-03-01 PROCEDURE — 3079F DIAST BP 80-89 MM HG: CPT | Mod: CPTII,S$GLB,, | Performed by: INTERNAL MEDICINE

## 2023-03-01 PROCEDURE — 1126F AMNT PAIN NOTED NONE PRSNT: CPT | Mod: CPTII,S$GLB,, | Performed by: INTERNAL MEDICINE

## 2023-03-01 PROCEDURE — 99999 PR PBB SHADOW E&M-EST. PATIENT-LVL III: ICD-10-PCS | Mod: PBBFAC,,, | Performed by: INTERNAL MEDICINE

## 2023-03-01 PROCEDURE — 3077F PR MOST RECENT SYSTOLIC BLOOD PRESSURE >= 140 MM HG: ICD-10-PCS | Mod: CPTII,S$GLB,, | Performed by: INTERNAL MEDICINE

## 2023-03-01 PROCEDURE — 1101F PT FALLS ASSESS-DOCD LE1/YR: CPT | Mod: CPTII,S$GLB,, | Performed by: INTERNAL MEDICINE

## 2023-03-01 PROCEDURE — 1101F PR PT FALLS ASSESS DOC 0-1 FALLS W/OUT INJ PAST YR: ICD-10-PCS | Mod: CPTII,S$GLB,, | Performed by: INTERNAL MEDICINE

## 2023-03-01 PROCEDURE — 1126F PR PAIN SEVERITY QUANTIFIED, NO PAIN PRESENT: ICD-10-PCS | Mod: CPTII,S$GLB,, | Performed by: INTERNAL MEDICINE

## 2023-03-01 PROCEDURE — 1159F MED LIST DOCD IN RCRD: CPT | Mod: CPTII,S$GLB,, | Performed by: INTERNAL MEDICINE

## 2023-03-01 PROCEDURE — 3079F PR MOST RECENT DIASTOLIC BLOOD PRESSURE 80-89 MM HG: ICD-10-PCS | Mod: CPTII,S$GLB,, | Performed by: INTERNAL MEDICINE

## 2023-03-01 PROCEDURE — 3077F SYST BP >= 140 MM HG: CPT | Mod: CPTII,S$GLB,, | Performed by: INTERNAL MEDICINE

## 2023-03-01 PROCEDURE — 3288F FALL RISK ASSESSMENT DOCD: CPT | Mod: CPTII,S$GLB,, | Performed by: INTERNAL MEDICINE

## 2023-03-01 PROCEDURE — 99214 OFFICE O/P EST MOD 30 MIN: CPT | Mod: S$GLB,,, | Performed by: INTERNAL MEDICINE

## 2023-03-01 PROCEDURE — 3288F PR FALLS RISK ASSESSMENT DOCUMENTED: ICD-10-PCS | Mod: CPTII,S$GLB,, | Performed by: INTERNAL MEDICINE

## 2023-03-01 PROCEDURE — 99214 PR OFFICE/OUTPT VISIT, EST, LEVL IV, 30-39 MIN: ICD-10-PCS | Mod: S$GLB,,, | Performed by: INTERNAL MEDICINE

## 2023-03-01 RX ORDER — AMLODIPINE BESYLATE 2.5 MG/1
2.5 TABLET ORAL DAILY
Qty: 90 TABLET | Refills: 4 | Status: SHIPPED | OUTPATIENT
Start: 2023-03-01 | End: 2024-03-04

## 2023-03-01 RX ORDER — IBUPROFEN 800 MG/1
TABLET ORAL
COMMUNITY
Start: 2022-12-17 | End: 2023-09-25

## 2023-03-01 NOTE — PROGRESS NOTES
Renal clinic f/u note:  Date of clinic visit: 3/1/23  Reason for f/u and chief c/o: CKD     HPI: Pt is a 76 y/o male with CKD stage 2 who presents for f/u. Pt has a h/o of HTN and prior prostate cancer. Pt feels well today, no acute or new c/o's, no discomfort, no symptoms reported. Medical h/o, labs and meds reviewed with pt.Pt was last seen in renal clinic in Jan 2022.Noted BP is slightly elevated.  Pt is only taking cardura.     PAST MEDICAL HISTORY:  CKD stage 2, HTN, Arthritis, Dyslipidemia, Hyperlipidemia, Hypothyroidism, Prostate cancer, Radiation induced proctitis, and Thyroid disease.     PAST SURGICAL HISTORY:  He  has a past surgical history that includes Hernia repair (2009 approx); seed implants in prostate (02/2009); Tonsillectomy; and Colonoscopy (N/A, 4/3/2017).     SOCIAL HISTORY:  He  reports that he has never smoked. He has never used smokeless tobacco. He reports that he does not drink alcohol and does not use drugs.     FAMILY MEDICAL HISTORY:  His family history includes Bone cancer in his brother; Cancer in his brother, mother, and sister; Colon cancer in his mother; Diabetes in his father; Heart disease in his maternal grandmother; Stroke in his mother.          Review of patient's allergies indicates:   Allergen Reactions    No known drug allergies        Meds reviewed    Current Outpatient Medications:     doxazosin (CARDURA) 4 MG tablet, TAKE 1 TABLET(4 MG) BY MOUTH EVERY EVENING, Disp: 30 tablet, Rfl: 11    ergocalciferol (ERGOCALCIFEROL) 50,000 unit Cap, TAKE 1 CAPSULE BY MOUTH EVERY 7 DAYS, Disp: 12 capsule, Rfl: 3    ibuprofen (ADVIL,MOTRIN) 800 MG tablet, ibuprofen Take 1 tablet (oral) 3 times per day for 10 days 20221217 tablet 3 times per day oral 10 days active 800 MG, Disp: , Rfl:     levothyroxine (SYNTHROID) 25 MCG tablet, TAKE 1 TABLET(25 MCG) BY MOUTH EVERY DAY, Disp: 90 tablet, Rfl: 2    PNEUMOVAX-23 25 mcg/0.5 mL injection, , Disp: , Rfl:     sildenafiL (VIAGRA) 50 MG  "tablet, Take 1 tablet (50 mg total) by mouth daily as needed for Erectile Dysfunction., Disp: 3 tablet, Rfl: 3    amLODIPine (NORVASC) 2.5 MG tablet, Take 1 tablet (2.5 mg total) by mouth once daily., Disp: 90 tablet, Rfl: 4    doxazosin (CARDURA) 4 MG tablet, TAKE 1 TABLET(4 MG) BY MOUTH EVERY EVENING, Disp: 90 tablet, Rfl: 3    gabapentin (NEURONTIN) 100 MG capsule, Take 1 capsule (100 mg total) by mouth every evening., Disp: 30 capsule, Rfl: 1          REVIEW OF SYSTEMS:  Patient has no fever, fatigue, visual changes, chest pain, edema, cough, dyspnea, nausea, vomiting, constipation, diarrhea, arthralgias, pruritis, dizziness, weakness, depression, confusion.     PHYSICAL EXAM:  Blood pressure (!) 154/84, pulse 63, resp. rate 20, height 5' 9" (1.753 m), weight 75.9 kg (167 lb 5.3 oz).  Gen:    WDWN male in no apparent distress  Psych: Normal mood and affect  Skin:    No rashes or ulcers  Neck:   No JVD  Chest:  Clear with no rales, rhonchi, wheezing with normal effort  CV:      Regular with no murmurs, gallops or rubs  Abd:     Soft, nontender, no distension  Ext:      No  edema     Labs reviewed  BMP  Lab Results   Component Value Date     02/22/2023    K 4.0 02/22/2023     02/22/2023    CO2 22 (L) 02/22/2023    BUN 25 (H) 02/22/2023    CREATININE 1.2 02/22/2023    CALCIUM 9.0 02/22/2023    ANIONGAP 9 02/22/2023    EGFRNORACEVR >60.0 02/22/2023     Lab Results   Component Value Date    WBC 5.91 09/21/2022    HGB 12.1 (L) 09/21/2022    HCT 40.7 09/21/2022     (H) 09/21/2022     09/21/2022      U/a: no protein, no blood  Urine prot/cr unremarkable     Renal u/s 2018 reviewed  Right kidney: The right kidney measures 10.2 cm. No cortical thinning. No loss of corticomedullary distinction. There is a 10 mm simple cyst at the upper pole on the right.  No solid renal masses visualized.  No renal stone. No hydronephrosis.  Left kidney: The left kidney measures 10.1 cm. No cortical thinning. No " loss of corticomedullary distinction. Small simple peripelvic cyst noted at the lower pole appears unchanged in size measuring approximately 12 mm.  No solid renal lesions visualized.  No renal stone. No hydronephrosis           IMPRESSION AND RECOMMENDATIONS:  78 y/o male with mild CKD presents for f/u:     1. Renal: s Cr stable, at baseline.  Stable renal function. CKD stage 2, bordering in stage 3.  H/o of HTN  Pt has age related decline in kidney function  In addition, has a h/o of HTN, likely hypertensive nephrosclerosis  No proteinuria  No hematuria  K normal  Na normal  Ca normal  Acid base stable     Renal u/s report reviewed  Has benign, simple cysts, o/w unremarkable     2. HTN: BP not well controlled.  Discussed with pt  Meds reviewed  Recommended addition of low dose amlodipine, pt this time agreed, had declined previosuly  Will start amlodipine 2.5 mg po qd     3. Life style, diet, physical activity reviewed     Plans and recommendations:   As discussed above  Total time spent 30 minutes including time needed to review the records, the   patient evaluation, documentation, face-to-face discussion with the patient,   more than 50% of the time was spent on coordination of care and counseling.    Level IV visit.  RTC 1 year     Arabella Ruff MD

## 2023-03-22 ENCOUNTER — OFFICE VISIT (OUTPATIENT)
Dept: FAMILY MEDICINE | Facility: CLINIC | Age: 78
End: 2023-03-22
Payer: MEDICARE

## 2023-03-22 ENCOUNTER — LAB VISIT (OUTPATIENT)
Dept: LAB | Facility: HOSPITAL | Age: 78
End: 2023-03-22
Attending: INTERNAL MEDICINE
Payer: MEDICARE

## 2023-03-22 VITALS
HEART RATE: 83 BPM | OXYGEN SATURATION: 97 % | HEIGHT: 69 IN | WEIGHT: 166.25 LBS | TEMPERATURE: 98 F | RESPIRATION RATE: 18 BRPM | SYSTOLIC BLOOD PRESSURE: 136 MMHG | BODY MASS INDEX: 24.62 KG/M2 | DIASTOLIC BLOOD PRESSURE: 82 MMHG

## 2023-03-22 DIAGNOSIS — E03.9 ACQUIRED HYPOTHYROIDISM: ICD-10-CM

## 2023-03-22 DIAGNOSIS — E78.5 DYSLIPIDEMIA: ICD-10-CM

## 2023-03-22 DIAGNOSIS — I10 PRIMARY HYPERTENSION: Primary | ICD-10-CM

## 2023-03-22 DIAGNOSIS — C61 PROSTATE CANCER: ICD-10-CM

## 2023-03-22 DIAGNOSIS — D64.9 ANEMIA, UNSPECIFIED TYPE: ICD-10-CM

## 2023-03-22 PROCEDURE — 1101F PR PT FALLS ASSESS DOC 0-1 FALLS W/OUT INJ PAST YR: ICD-10-PCS | Mod: CPTII,S$GLB,, | Performed by: INTERNAL MEDICINE

## 2023-03-22 PROCEDURE — 84443 ASSAY THYROID STIM HORMONE: CPT | Performed by: INTERNAL MEDICINE

## 2023-03-22 PROCEDURE — 1126F PR PAIN SEVERITY QUANTIFIED, NO PAIN PRESENT: ICD-10-PCS | Mod: CPTII,S$GLB,, | Performed by: INTERNAL MEDICINE

## 2023-03-22 PROCEDURE — 3075F PR MOST RECENT SYSTOLIC BLOOD PRESS GE 130-139MM HG: ICD-10-PCS | Mod: CPTII,S$GLB,, | Performed by: INTERNAL MEDICINE

## 2023-03-22 PROCEDURE — 99999 PR PBB SHADOW E&M-EST. PATIENT-LVL IV: ICD-10-PCS | Mod: PBBFAC,,, | Performed by: INTERNAL MEDICINE

## 2023-03-22 PROCEDURE — 99214 PR OFFICE/OUTPT VISIT, EST, LEVL IV, 30-39 MIN: ICD-10-PCS | Mod: S$GLB,,, | Performed by: INTERNAL MEDICINE

## 2023-03-22 PROCEDURE — 1101F PT FALLS ASSESS-DOCD LE1/YR: CPT | Mod: CPTII,S$GLB,, | Performed by: INTERNAL MEDICINE

## 2023-03-22 PROCEDURE — 3079F PR MOST RECENT DIASTOLIC BLOOD PRESSURE 80-89 MM HG: ICD-10-PCS | Mod: CPTII,S$GLB,, | Performed by: INTERNAL MEDICINE

## 2023-03-22 PROCEDURE — 1126F AMNT PAIN NOTED NONE PRSNT: CPT | Mod: CPTII,S$GLB,, | Performed by: INTERNAL MEDICINE

## 2023-03-22 PROCEDURE — 36415 COLL VENOUS BLD VENIPUNCTURE: CPT | Mod: PO | Performed by: INTERNAL MEDICINE

## 2023-03-22 PROCEDURE — 3075F SYST BP GE 130 - 139MM HG: CPT | Mod: CPTII,S$GLB,, | Performed by: INTERNAL MEDICINE

## 2023-03-22 PROCEDURE — 99214 OFFICE O/P EST MOD 30 MIN: CPT | Mod: S$GLB,,, | Performed by: INTERNAL MEDICINE

## 2023-03-22 PROCEDURE — 3079F DIAST BP 80-89 MM HG: CPT | Mod: CPTII,S$GLB,, | Performed by: INTERNAL MEDICINE

## 2023-03-22 PROCEDURE — 3288F PR FALLS RISK ASSESSMENT DOCUMENTED: ICD-10-PCS | Mod: CPTII,S$GLB,, | Performed by: INTERNAL MEDICINE

## 2023-03-22 PROCEDURE — 1159F PR MEDICATION LIST DOCUMENTED IN MEDICAL RECORD: ICD-10-PCS | Mod: CPTII,S$GLB,, | Performed by: INTERNAL MEDICINE

## 2023-03-22 PROCEDURE — 1159F MED LIST DOCD IN RCRD: CPT | Mod: CPTII,S$GLB,, | Performed by: INTERNAL MEDICINE

## 2023-03-22 PROCEDURE — 3288F FALL RISK ASSESSMENT DOCD: CPT | Mod: CPTII,S$GLB,, | Performed by: INTERNAL MEDICINE

## 2023-03-22 PROCEDURE — 99999 PR PBB SHADOW E&M-EST. PATIENT-LVL IV: CPT | Mod: PBBFAC,,, | Performed by: INTERNAL MEDICINE

## 2023-03-22 NOTE — PROGRESS NOTES
Subjective:       Patient ID: David Amaro is a 77 y.o. male.    Chief Complaint: Hypertension, Hyperlipidemia, Anemia, and Hypothyroidism    Hypertension  Pertinent negatives include no chest pain, headaches, neck pain, palpitations or shortness of breath.   Hyperlipidemia  Pertinent negatives include no chest pain, myalgias or shortness of breath.   Anemia  There has been no abdominal pain, bruising/bleeding easily, confusion, fever, light-headedness, pallor or palpitations.   Past Medical History:   Diagnosis Date    Arthritis     Bilateral low back pain 9/2/2021    Cancer     prostate    CKD (chronic kidney disease)     Dyslipidemia     HTN (hypertension)     Hyperlipidemia     Hypothyroidism     Prostate cancer     Radiation induced proctitis     Thyroid disease      Past Surgical History:   Procedure Laterality Date    COLONOSCOPY N/A 4/3/2017    Procedure: COLONOSCOPY;  Surgeon: Olivier Grayson MD;  Location: [facility];  Service: Endoscopy;  Laterality: N/A;    COLONOSCOPY N/A 8/3/2022    Procedure: COLONOSCOPY;  Surgeon: Ramila Burgess DO;  Location: [facility];  Service: General;  Laterality: N/A;    HERNIA REPAIR  2009 approx    seed implants in prostate  02/2009    TONSILLECTOMY       Family History   Problem Relation Age of Onset    Stroke Mother     Cancer Mother     Colon cancer Mother     Diabetes Father     Cancer Sister         lung    Cancer Brother     Bone cancer Brother     Heart disease Maternal Grandmother      Social History     Socioeconomic History    Marital status:    Tobacco Use    Smoking status: Never    Smokeless tobacco: Never   Substance and Sexual Activity    Alcohol use: No    Drug use: No    Sexual activity: Yes     Partners: Female     Social Determinants of Health     Financial Resource Strain: Low Risk     Difficulty of Paying Living Expenses: Not hard at all   Food Insecurity: No Food Insecurity    Worried About Running Out of Food in the Last Year: Never true     Ran Out of Food in the Last Year: Never true   Transportation Needs: No Transportation Needs    Lack of Transportation (Medical): No    Lack of Transportation (Non-Medical): No   Physical Activity: Insufficiently Active    Days of Exercise per Week: 1 day    Minutes of Exercise per Session: 20 min   Stress: No Stress Concern Present    Feeling of Stress : Not at all   Social Connections: Unknown    Frequency of Communication with Friends and Family: Three times a week    Frequency of Social Gatherings with Friends and Family: Once a week    Active Member of Clubs or Organizations: No    Attends Club or Organization Meetings: Never    Marital Status:    Housing Stability: Low Risk     Unable to Pay for Housing in the Last Year: No    Number of Places Lived in the Last Year: 1    Unstable Housing in the Last Year: No     Review of Systems   Constitutional:  Negative for activity change, appetite change, chills, diaphoresis, fatigue, fever and unexpected weight change.   HENT:  Negative for drooling, ear discharge, ear pain, facial swelling, hearing loss, mouth sores, nosebleeds, postnasal drip, rhinorrhea, sinus pressure, sneezing, sore throat, tinnitus, trouble swallowing and voice change.    Eyes:  Negative for photophobia, redness and visual disturbance.   Respiratory:  Negative for apnea, cough, choking, chest tightness, shortness of breath and wheezing.    Cardiovascular:  Negative for chest pain, palpitations and leg swelling.   Gastrointestinal:  Negative for abdominal distention, abdominal pain, anal bleeding, blood in stool, constipation, diarrhea, nausea, rectal pain and vomiting.   Endocrine: Negative for cold intolerance, heat intolerance, polydipsia, polyphagia and polyuria.   Genitourinary:  Negative for difficulty urinating, dysuria, enuresis, flank pain, frequency, genital sores, hematuria and urgency.   Musculoskeletal:  Positive for arthralgias. Negative for back pain, gait problem, joint  swelling, myalgias, neck pain and neck stiffness.   Skin:  Negative for color change, pallor, rash and wound.   Allergic/Immunologic: Negative for food allergies and immunocompromised state.   Neurological:  Negative for dizziness, tremors, seizures, syncope, facial asymmetry, speech difficulty, weakness, light-headedness, numbness and headaches.   Hematological:  Negative for adenopathy. Does not bruise/bleed easily.   Psychiatric/Behavioral:  Negative for agitation, behavioral problems, confusion, decreased concentration, dysphoric mood, hallucinations, self-injury, sleep disturbance and suicidal ideas. The patient is not nervous/anxious and is not hyperactive.      Objective:      Physical Exam  Vitals and nursing note reviewed.   Constitutional:       General: He is not in acute distress.     Appearance: Normal appearance. He is well-developed. He is not diaphoretic.   HENT:      Head: Normocephalic and atraumatic.      Mouth/Throat:      Pharynx: No oropharyngeal exudate.   Eyes:      General: No scleral icterus.     Pupils: Pupils are equal, round, and reactive to light.   Neck:      Thyroid: No thyromegaly.      Vascular: No carotid bruit or JVD.      Trachea: No tracheal deviation.   Cardiovascular:      Rate and Rhythm: Normal rate and regular rhythm.      Heart sounds: Normal heart sounds.   Pulmonary:      Effort: Pulmonary effort is normal. No respiratory distress.      Breath sounds: Normal breath sounds. No wheezing or rales.   Chest:      Chest wall: No tenderness.   Abdominal:      General: Bowel sounds are normal. There is no distension.      Palpations: Abdomen is soft.      Tenderness: There is no abdominal tenderness. There is no guarding or rebound.   Musculoskeletal:         General: No tenderness. Normal range of motion.      Cervical back: Normal range of motion and neck supple.   Lymphadenopathy:      Cervical: No cervical adenopathy.   Skin:     General: Skin is warm and dry.       Coloration: Skin is not pale.      Findings: No erythema or rash.   Neurological:      Mental Status: He is alert and oriented to person, place, and time.   Psychiatric:         Behavior: Behavior normal.         Thought Content: Thought content normal.         Judgment: Judgment normal.       CMP  Sodium   Date Value Ref Range Status   02/22/2023 140 136 - 145 mmol/L Final     Potassium   Date Value Ref Range Status   02/22/2023 4.0 3.5 - 5.1 mmol/L Final     Chloride   Date Value Ref Range Status   02/22/2023 109 95 - 110 mmol/L Final     CO2   Date Value Ref Range Status   02/22/2023 22 (L) 23 - 29 mmol/L Final     Glucose   Date Value Ref Range Status   02/22/2023 81 70 - 110 mg/dL Final     BUN   Date Value Ref Range Status   02/22/2023 25 (H) 8 - 23 mg/dL Final     Creatinine   Date Value Ref Range Status   02/22/2023 1.2 0.5 - 1.4 mg/dL Final     Calcium   Date Value Ref Range Status   02/22/2023 9.0 8.7 - 10.5 mg/dL Final     Total Protein   Date Value Ref Range Status   09/21/2022 6.2 6.0 - 8.4 g/dL Final     Albumin   Date Value Ref Range Status   02/22/2023 3.6 3.5 - 5.2 g/dL Final     Total Bilirubin   Date Value Ref Range Status   09/21/2022 0.6 0.1 - 1.0 mg/dL Final     Comment:     For infants and newborns, interpretation of results should be based  on gestational age, weight and in agreement with clinical  observations.    Premature Infant recommended reference ranges:  Up to 24 hours.............<8.0 mg/dL  Up to 48 hours............<12.0 mg/dL  3-5 days..................<15.0 mg/dL  6-29 days.................<15.0 mg/dL       Alkaline Phosphatase   Date Value Ref Range Status   09/21/2022 74 55 - 135 U/L Final     AST   Date Value Ref Range Status   09/21/2022 27 10 - 40 U/L Final     ALT   Date Value Ref Range Status   09/21/2022 24 10 - 44 U/L Final     Anion Gap   Date Value Ref Range Status   02/22/2023 9 8 - 16 mmol/L Final     eGFR if    Date Value Ref Range Status    01/14/2022 >60.0 >60 mL/min/1.73 m^2 Final     eGFR if non    Date Value Ref Range Status   01/14/2022 53.4 (A) >60 mL/min/1.73 m^2 Final     Comment:     Calculation used to obtain the estimated glomerular filtration  rate (eGFR) is the CKD-EPI equation.        Lab Results   Component Value Date    WBC 5.91 09/21/2022    HGB 12.1 (L) 09/21/2022    HCT 40.7 09/21/2022     (H) 09/21/2022     09/21/2022     Lab Results   Component Value Date    CHOL 192 05/18/2022     Lab Results   Component Value Date    HDL 77 (H) 05/18/2022     Lab Results   Component Value Date    LDLCALC 109.8 05/18/2022     Lab Results   Component Value Date    TRIG 26 (L) 05/18/2022     Lab Results   Component Value Date    CHOLHDL 40.1 05/18/2022     Lab Results   Component Value Date    TSH 2.194 05/18/2022     Lab Results   Component Value Date    HGBA1C 5.7 12/20/2005     Assessment:       1. Primary hypertension    2. Acquired hypothyroidism    3. Prostate cancer    4. Dyslipidemia    5. Anemia, unspecified type          Plan:   Primary hypertension    Acquired hypothyroidism  -     TSH; Future; Expected date: 03/22/2023    Prostate cancer    Dyslipidemia    Anemia, unspecified type      Stable-----------continue meds, sees urology dr kerns, cards dr cunningham----------------as above---------f/u 6 months-----------

## 2023-03-23 LAB — TSH SERPL DL<=0.005 MIU/L-ACNC: 1.88 UIU/ML (ref 0.4–4)

## 2023-05-28 NOTE — TELEPHONE ENCOUNTER
No care due was identified.  Health [facility] Embedded Care Due Messages. Reference number: [id number].   5/28/2023 6:14:08 AM CDT

## 2023-05-29 RX ORDER — LEVOTHYROXINE SODIUM 25 UG/1
TABLET ORAL
Qty: 90 TABLET | Refills: 3 | Status: SHIPPED | OUTPATIENT
Start: 2023-05-29

## 2023-05-29 NOTE — TELEPHONE ENCOUNTER
Refill Decision Note   David Amaro  is requesting a refill authorization.  Brief Assessment and Rationale for Refill:  Approve     Medication Therapy Plan:       Medication Reconciliation Completed: No   Comments:     No Care Gaps recommended.     Note composed:10:23 AM 05/29/2023

## 2023-09-12 NOTE — PATIENT INSTRUCTIONS
Counseling and Referral of Other Preventative  (Italic type indicates deductible and co-insurance are waived)    Patient Name: David Amaro  Today's Date: 11/14/2018    Health Maintenance       Date Due Completion Date    PROSTATE-SPECIFIC ANTIGEN 04/08/2009 4/8/2008 (Done)    FOLLOWED BY UROLOGY    Pneumococcal (65+) (2 of 2 - PPSV23) 12/29/2017 12/29/2016    Influenza Vaccine  9/26/2018 AT PHARMACY    Lipid Panel 05/16/2019 5/16/2018    Colonoscopy 04/03/2022 4/3/2017          The following information is provided to all patients.  This information is to help you find resources for any of the problems found today that may be affecting your health:                Living healthy guide: www.[facility].[place].gov      Understanding Diabetes: www.diabetes.org      Eating healthy: www.cdc.gov/healthyweight      CDC home safety checklist: www.cdc.gov/steadi/patient.html      Agency on Aging: www.goea.[place].gov      Alcoholics anonymous (AA): www.aa.org      Physical Activity: www.chuck.nih.gov/kj4stqu      Tobacco use: www.quitwithusla.org      Called lab about UDS not resulted. They had initially misplaced the sample but they have since found it and will run the UDS.

## 2023-09-25 ENCOUNTER — LAB VISIT (OUTPATIENT)
Dept: LAB | Facility: HOSPITAL | Age: 78
End: 2023-09-25
Attending: INTERNAL MEDICINE
Payer: MEDICARE

## 2023-09-25 ENCOUNTER — OFFICE VISIT (OUTPATIENT)
Dept: FAMILY MEDICINE | Facility: CLINIC | Age: 78
End: 2023-09-25
Payer: MEDICARE

## 2023-09-25 VITALS
HEART RATE: 53 BPM | OXYGEN SATURATION: 97 % | RESPIRATION RATE: 16 BRPM | TEMPERATURE: 98 F | SYSTOLIC BLOOD PRESSURE: 118 MMHG | BODY MASS INDEX: 24.84 KG/M2 | WEIGHT: 168.19 LBS | DIASTOLIC BLOOD PRESSURE: 70 MMHG

## 2023-09-25 DIAGNOSIS — I10 PRIMARY HYPERTENSION: ICD-10-CM

## 2023-09-25 DIAGNOSIS — E03.9 ACQUIRED HYPOTHYROIDISM: ICD-10-CM

## 2023-09-25 DIAGNOSIS — I10 PRIMARY HYPERTENSION: Primary | ICD-10-CM

## 2023-09-25 DIAGNOSIS — E23.6 EMPTY SELLA: ICD-10-CM

## 2023-09-25 DIAGNOSIS — E78.5 DYSLIPIDEMIA: ICD-10-CM

## 2023-09-25 DIAGNOSIS — Z85.46 HISTORY OF PROSTATE CANCER: ICD-10-CM

## 2023-09-25 PROCEDURE — 3074F SYST BP LT 130 MM HG: CPT | Mod: CPTII,S$GLB,, | Performed by: INTERNAL MEDICINE

## 2023-09-25 PROCEDURE — 1101F PT FALLS ASSESS-DOCD LE1/YR: CPT | Mod: CPTII,S$GLB,, | Performed by: INTERNAL MEDICINE

## 2023-09-25 PROCEDURE — 99999 PR PBB SHADOW E&M-EST. PATIENT-LVL III: CPT | Mod: PBBFAC,,, | Performed by: INTERNAL MEDICINE

## 2023-09-25 PROCEDURE — 99999 PR PBB SHADOW E&M-EST. PATIENT-LVL III: ICD-10-PCS | Mod: PBBFAC,,, | Performed by: INTERNAL MEDICINE

## 2023-09-25 PROCEDURE — 1159F PR MEDICATION LIST DOCUMENTED IN MEDICAL RECORD: ICD-10-PCS | Mod: CPTII,S$GLB,, | Performed by: INTERNAL MEDICINE

## 2023-09-25 PROCEDURE — 1126F PR PAIN SEVERITY QUANTIFIED, NO PAIN PRESENT: ICD-10-PCS | Mod: CPTII,S$GLB,, | Performed by: INTERNAL MEDICINE

## 2023-09-25 PROCEDURE — 3078F DIAST BP <80 MM HG: CPT | Mod: CPTII,S$GLB,, | Performed by: INTERNAL MEDICINE

## 2023-09-25 PROCEDURE — 3288F FALL RISK ASSESSMENT DOCD: CPT | Mod: CPTII,S$GLB,, | Performed by: INTERNAL MEDICINE

## 2023-09-25 PROCEDURE — 3074F PR MOST RECENT SYSTOLIC BLOOD PRESSURE < 130 MM HG: ICD-10-PCS | Mod: CPTII,S$GLB,, | Performed by: INTERNAL MEDICINE

## 2023-09-25 PROCEDURE — 90694 FLU VACCINE - QUADRIVALENT - ADJUVANTED: ICD-10-PCS | Mod: S$GLB,,, | Performed by: INTERNAL MEDICINE

## 2023-09-25 PROCEDURE — 85025 COMPLETE CBC W/AUTO DIFF WBC: CPT | Performed by: INTERNAL MEDICINE

## 2023-09-25 PROCEDURE — 99214 PR OFFICE/OUTPT VISIT, EST, LEVL IV, 30-39 MIN: ICD-10-PCS | Mod: S$GLB,,, | Performed by: INTERNAL MEDICINE

## 2023-09-25 PROCEDURE — 1101F PR PT FALLS ASSESS DOC 0-1 FALLS W/OUT INJ PAST YR: ICD-10-PCS | Mod: CPTII,S$GLB,, | Performed by: INTERNAL MEDICINE

## 2023-09-25 PROCEDURE — G0008 FLU VACCINE - QUADRIVALENT - ADJUVANTED: ICD-10-PCS | Mod: S$GLB,,, | Performed by: INTERNAL MEDICINE

## 2023-09-25 PROCEDURE — 1126F AMNT PAIN NOTED NONE PRSNT: CPT | Mod: CPTII,S$GLB,, | Performed by: INTERNAL MEDICINE

## 2023-09-25 PROCEDURE — 3288F PR FALLS RISK ASSESSMENT DOCUMENTED: ICD-10-PCS | Mod: CPTII,S$GLB,, | Performed by: INTERNAL MEDICINE

## 2023-09-25 PROCEDURE — G0008 ADMIN INFLUENZA VIRUS VAC: HCPCS | Mod: S$GLB,,, | Performed by: INTERNAL MEDICINE

## 2023-09-25 PROCEDURE — 36415 COLL VENOUS BLD VENIPUNCTURE: CPT | Mod: PO | Performed by: INTERNAL MEDICINE

## 2023-09-25 PROCEDURE — 1159F MED LIST DOCD IN RCRD: CPT | Mod: CPTII,S$GLB,, | Performed by: INTERNAL MEDICINE

## 2023-09-25 PROCEDURE — 3078F PR MOST RECENT DIASTOLIC BLOOD PRESSURE < 80 MM HG: ICD-10-PCS | Mod: CPTII,S$GLB,, | Performed by: INTERNAL MEDICINE

## 2023-09-25 PROCEDURE — 99214 OFFICE O/P EST MOD 30 MIN: CPT | Mod: S$GLB,,, | Performed by: INTERNAL MEDICINE

## 2023-09-25 PROCEDURE — 90694 VACC AIIV4 NO PRSRV 0.5ML IM: CPT | Mod: S$GLB,,, | Performed by: INTERNAL MEDICINE

## 2023-09-25 NOTE — PROGRESS NOTES
Subjective:       Patient ID: David Amaro is a 78 y.o. male.    Chief Complaint: Follow-up (6m), Constipation, Hypertension, Hyperlipidemia, and Hypothyroidism    Follow-up  Associated symptoms include arthralgias. Pertinent negatives include no abdominal pain, chest pain, chills, coughing, diaphoresis, fatigue, fever, headaches, joint swelling, myalgias, nausea, neck pain, numbness, rash, sore throat, vomiting or weakness.   Constipation  Pertinent negatives include no abdominal pain, back pain, diarrhea, difficulty urinating, fever, nausea or vomiting.   Hypertension  Pertinent negatives include no chest pain, headaches, neck pain, palpitations or shortness of breath.   Hyperlipidemia  Pertinent negatives include no chest pain, myalgias or shortness of breath.     Past Medical History:   Diagnosis Date    Arthritis     Bilateral low back pain 9/2/2021    Cancer     prostate    CKD (chronic kidney disease)     Dyslipidemia     HTN (hypertension)     Hyperlipidemia     Hypothyroidism     Prostate cancer     Radiation induced proctitis     Thyroid disease      Past Surgical History:   Procedure Laterality Date    COLONOSCOPY N/A 4/3/2017    Procedure: COLONOSCOPY;  Surgeon: Olivier Grayson MD;  Location: [facility];  Service: Endoscopy;  Laterality: N/A;    COLONOSCOPY N/A 8/3/2022    Procedure: COLONOSCOPY;  Surgeon: Ramila Burgess DO;  Location: [facility] ENDO;  Service: General;  Laterality: N/A;    HERNIA REPAIR  2009 approx    seed implants in prostate  02/2009    TONSILLECTOMY       Family History   Problem Relation Age of Onset    Stroke Mother     Cancer Mother     Colon cancer Mother     Diabetes Father     Cancer Sister         lung    Cancer Brother     Bone cancer Brother     Heart disease Maternal Grandmother      Social History     Socioeconomic History    Marital status:    Tobacco Use    Smoking status: Never    Smokeless tobacco: Never   Substance and Sexual Activity    Alcohol use: No     Drug use: No    Sexual activity: Yes     Partners: Female     Social Determinants of Health     Financial Resource Strain: Low Risk  (6/1/2022)    Overall Financial Resource Strain (CARDIA)     Difficulty of Paying Living Expenses: Not hard at all   Food Insecurity: No Food Insecurity (6/1/2022)    Hunger Vital Sign     Worried About Running Out of Food in the Last Year: Never true     Ran Out of Food in the Last Year: Never true   Transportation Needs: No Transportation Needs (6/1/2022)    PRAPARE - Transportation     Lack of Transportation (Medical): No     Lack of Transportation (Non-Medical): No   Physical Activity: Insufficiently Active (6/1/2022)    Exercise Vital Sign     Days of Exercise per Week: 1 day     Minutes of Exercise per Session: 20 min   Stress: No Stress Concern Present (6/1/2022)    [employer] [employer] - Occupational Stress Questionnaire     Feeling of Stress : Not at all   Social Connections: Unknown (6/1/2022)    Social Connection and Isolation Panel [NHANES]     Frequency of Communication with Friends and Family: Three times a week     Frequency of Social Gatherings with Friends and Family: Once a week     Active Member of Clubs or Organizations: No     Attends Club or Organization Meetings: Never     Marital Status:    Housing Stability: Low Risk  (6/1/2022)    Housing Stability Vital Sign     Unable to Pay for Housing in the Last Year: No     Number of Places Lived in the Last Year: 1     Unstable Housing in the Last Year: No     Review of Systems   Constitutional:  Negative for activity change, appetite change, chills, diaphoresis, fatigue, fever and unexpected weight change.   HENT:  Negative for drooling, ear discharge, ear pain, facial swelling, hearing loss, mouth sores, nosebleeds, postnasal drip, rhinorrhea, sinus pressure, sneezing, sore throat, tinnitus, trouble swallowing and voice change.    Eyes:  Negative for photophobia, redness and visual  disturbance.   Respiratory:  Negative for apnea, cough, choking, chest tightness, shortness of breath and wheezing.    Cardiovascular:  Negative for chest pain, palpitations and leg swelling.   Gastrointestinal:  Positive for constipation. Negative for abdominal distention, abdominal pain, anal bleeding, blood in stool, diarrhea, nausea and vomiting.   Endocrine: Negative for cold intolerance, heat intolerance, polydipsia, polyphagia and polyuria.   Genitourinary:  Negative for difficulty urinating, dysuria, enuresis, flank pain, frequency, genital sores, hematuria and urgency.   Musculoskeletal:  Positive for arthralgias. Negative for back pain, gait problem, joint swelling, myalgias, neck pain and neck stiffness.   Skin:  Negative for color change, pallor, rash and wound.   Allergic/Immunologic: Negative for food allergies and immunocompromised state.   Neurological:  Negative for dizziness, tremors, seizures, syncope, facial asymmetry, speech difficulty, weakness, light-headedness, numbness and headaches.   Hematological:  Negative for adenopathy. Does not bruise/bleed easily.   Psychiatric/Behavioral:  Negative for agitation, behavioral problems, confusion, decreased concentration, dysphoric mood, hallucinations, self-injury, sleep disturbance and suicidal ideas. The patient is not nervous/anxious and is not hyperactive.        Objective:      Physical Exam  Vitals and nursing note reviewed.   Constitutional:       General: He is not in acute distress.     Appearance: Normal appearance. He is well-developed. He is not diaphoretic.   HENT:      Head: Normocephalic and atraumatic.      Mouth/Throat:      Pharynx: No oropharyngeal exudate.   Eyes:      General: No scleral icterus.     Pupils: Pupils are equal, round, and reactive to light.   Neck:      Thyroid: No thyromegaly.      Vascular: No carotid bruit or JVD.      Trachea: No tracheal deviation.   Cardiovascular:      Rate and Rhythm: Normal rate and regular  rhythm.      Heart sounds: Normal heart sounds.   Pulmonary:      Effort: Pulmonary effort is normal. No respiratory distress.      Breath sounds: Normal breath sounds. No wheezing or rales.   Chest:      Chest wall: No tenderness.   Abdominal:      General: Bowel sounds are normal. There is no distension.      Palpations: Abdomen is soft.      Tenderness: There is no abdominal tenderness. There is no guarding or rebound.   Musculoskeletal:         General: No tenderness. Normal range of motion.      Cervical back: Normal range of motion and neck supple.   Lymphadenopathy:      Cervical: No cervical adenopathy.   Skin:     General: Skin is warm and dry.      Coloration: Skin is not pale.      Findings: No erythema or rash.   Neurological:      Mental Status: He is alert and oriented to person, place, and time.   Psychiatric:         Behavior: Behavior normal.         Thought Content: Thought content normal.         Judgment: Judgment normal.         CMP  Sodium   Date Value Ref Range Status   02/22/2023 140 136 - 145 mmol/L Final     Potassium   Date Value Ref Range Status   02/22/2023 4.0 3.5 - 5.1 mmol/L Final     Chloride   Date Value Ref Range Status   02/22/2023 109 95 - 110 mmol/L Final     CO2   Date Value Ref Range Status   02/22/2023 22 (L) 23 - 29 mmol/L Final     Glucose   Date Value Ref Range Status   02/22/2023 81 70 - 110 mg/dL Final     BUN   Date Value Ref Range Status   02/22/2023 25 (H) 8 - 23 mg/dL Final     Creatinine   Date Value Ref Range Status   02/22/2023 1.2 0.5 - 1.4 mg/dL Final     Calcium   Date Value Ref Range Status   02/22/2023 9.0 8.7 - 10.5 mg/dL Final     Total Protein   Date Value Ref Range Status   09/21/2022 6.2 6.0 - 8.4 g/dL Final     Albumin   Date Value Ref Range Status   02/22/2023 3.6 3.5 - 5.2 g/dL Final     Total Bilirubin   Date Value Ref Range Status   09/21/2022 0.6 0.1 - 1.0 mg/dL Final     Comment:     For infants and newborns, interpretation of results should be  based  on gestational age, weight and in agreement with clinical  observations.    Premature Infant recommended reference ranges:  Up to 24 hours.............<8.0 mg/dL  Up to 48 hours............<12.0 mg/dL  3-5 days..................<15.0 mg/dL  6-29 days.................<15.0 mg/dL       Alkaline Phosphatase   Date Value Ref Range Status   09/21/2022 74 55 - 135 U/L Final     AST   Date Value Ref Range Status   09/21/2022 27 10 - 40 U/L Final     ALT   Date Value Ref Range Status   09/21/2022 24 10 - 44 U/L Final     Anion Gap   Date Value Ref Range Status   02/22/2023 9 8 - 16 mmol/L Final     eGFR if    Date Value Ref Range Status   01/14/2022 >60.0 >60 mL/min/1.73 m^2 Final     eGFR if non    Date Value Ref Range Status   01/14/2022 53.4 (A) >60 mL/min/1.73 m^2 Final     Comment:     Calculation used to obtain the estimated glomerular filtration  rate (eGFR) is the CKD-EPI equation.        Lab Results   Component Value Date    WBC 5.91 09/21/2022    HGB 12.1 (L) 09/21/2022    HCT 40.7 09/21/2022     (H) 09/21/2022     09/21/2022     Lab Results   Component Value Date    CHOL 192 05/18/2022     Lab Results   Component Value Date    HDL 77 (H) 05/18/2022     Lab Results   Component Value Date    LDLCALC 109.8 05/18/2022     Lab Results   Component Value Date    TRIG 26 (L) 05/18/2022     Lab Results   Component Value Date    CHOLHDL 40.1 05/18/2022     Lab Results   Component Value Date    TSH 1.880 03/22/2023     Lab Results   Component Value Date    HGBA1C 5.7 12/20/2005     Assessment:       1. Primary hypertension    2. Dyslipidemia    3. Acquired hypothyroidism    4. History of prostate cancer    5. Empty sella        Plan:   Primary hypertension  -     Comprehensive Metabolic Panel; Future; Expected date: 09/25/2023  -     CBC Auto Differential; Future; Expected date: 09/25/2023    Dyslipidemia    Acquired hypothyroidism    History of prostate  cancer-----------sees urology dr delvalle----------    Empty sella    Other orders  -     Influenza - Quadrivalent (Adjuvanted)      Stable-------------continue current meds----------as above-----------sees cards dr cunningham----------------f/u 6 months-

## 2023-09-26 LAB
BASOPHILS # BLD AUTO: 0.07 K/UL (ref 0–0.2)
BASOPHILS NFR BLD: 1.1 % (ref 0–1.9)
DIFFERENTIAL METHOD: ABNORMAL
EOSINOPHIL # BLD AUTO: 0.1 K/UL (ref 0–0.5)
EOSINOPHIL NFR BLD: 0.8 % (ref 0–8)
ERYTHROCYTE [DISTWIDTH] IN BLOOD BY AUTOMATED COUNT: 12.4 % (ref 11.5–14.5)
HCT VFR BLD AUTO: 41.9 % (ref 40–54)
HGB BLD-MCNC: 13.1 G/DL (ref 14–18)
IMM GRANULOCYTES # BLD AUTO: 0.02 K/UL (ref 0–0.04)
IMM GRANULOCYTES NFR BLD AUTO: 0.3 % (ref 0–0.5)
LYMPHOCYTES # BLD AUTO: 1.4 K/UL (ref 1–4.8)
LYMPHOCYTES NFR BLD: 21.5 % (ref 18–48)
MCH RBC QN AUTO: 30.1 PG (ref 27–31)
MCHC RBC AUTO-ENTMCNC: 31.3 G/DL (ref 32–36)
MCV RBC AUTO: 96 FL (ref 82–98)
MONOCYTES # BLD AUTO: 0.4 K/UL (ref 0.3–1)
MONOCYTES NFR BLD: 6.8 % (ref 4–15)
NEUTROPHILS # BLD AUTO: 4.4 K/UL (ref 1.8–7.7)
NEUTROPHILS NFR BLD: 69.5 % (ref 38–73)
NRBC BLD-RTO: 0 /100 WBC
PLATELET # BLD AUTO: 237 K/UL (ref 150–450)
PMV BLD AUTO: 10.1 FL (ref 9.2–12.9)
RBC # BLD AUTO: 4.35 M/UL (ref 4.6–6.2)
WBC # BLD AUTO: 6.36 K/UL (ref 3.9–12.7)

## 2023-09-27 ENCOUNTER — TELEPHONE (OUTPATIENT)
Dept: FAMILY MEDICINE | Facility: CLINIC | Age: 78
End: 2023-09-27
Payer: MEDICARE

## 2023-09-27 DIAGNOSIS — I10 HYPERTENSION, UNSPECIFIED TYPE: Primary | ICD-10-CM

## 2023-09-27 NOTE — TELEPHONE ENCOUNTER
----- Message from Stepan Pierre MD sent at 9/27/2023  7:35 AM CDT -----  Regarding: FW: Client Services  Contact: [phone]    ----- Message -----  From: Gianni Bruner  Sent: 9/27/2023   2:41 AM CDT  To: Stepan Pierre MD; #  Subject: Client Services                                  Good Morning,     My name is Gianni Bruner, I work in the Lab Client Services. We had a problem with some lab work on this patient. If someone from your office could call us at [phone] or [phone] that would be great. Anyone in my department can help.      Thank you,

## 2023-10-12 ENCOUNTER — TELEPHONE (OUTPATIENT)
Dept: INTERNAL MEDICINE | Facility: CLINIC | Age: 78
End: 2023-10-12
Payer: MEDICARE

## 2023-10-12 DIAGNOSIS — I10 HYPERTENSION, UNSPECIFIED TYPE: Primary | ICD-10-CM

## 2023-10-12 NOTE — TELEPHONE ENCOUNTER
----- Message from Yazmin Spring sent at 10/12/2023  5:32 AM CDT -----  Regarding: Lab Client Services  Contact: [phone]  Good Morning,     My name is Yazmin Spring I work in the Lab Client Services. We had a problem with some lab work on this patient. If someone from your office could call us at [phone] or [phone] that would be great. Anyone in my department can help.      Thank you

## 2023-10-13 ENCOUNTER — LAB VISIT (OUTPATIENT)
Dept: LAB | Facility: HOSPITAL | Age: 78
End: 2023-10-13
Payer: MEDICARE

## 2023-10-13 DIAGNOSIS — I10 HYPERTENSION, UNSPECIFIED TYPE: ICD-10-CM

## 2023-10-13 PROCEDURE — 80053 COMPREHEN METABOLIC PANEL: CPT | Performed by: INTERNAL MEDICINE

## 2023-10-13 PROCEDURE — 36415 COLL VENOUS BLD VENIPUNCTURE: CPT | Mod: PO | Performed by: INTERNAL MEDICINE

## 2023-10-14 LAB
ALBUMIN SERPL BCP-MCNC: 3.7 G/DL (ref 3.5–5.2)
ALP SERPL-CCNC: 79 U/L (ref 55–135)
ALT SERPL W/O P-5'-P-CCNC: 26 U/L (ref 10–44)
ANION GAP SERPL CALC-SCNC: 8 MMOL/L (ref 8–16)
AST SERPL-CCNC: 23 U/L (ref 10–40)
BILIRUB SERPL-MCNC: 0.2 MG/DL (ref 0.1–1)
BUN SERPL-MCNC: 22 MG/DL (ref 8–23)
CALCIUM SERPL-MCNC: 9.1 MG/DL (ref 8.7–10.5)
CHLORIDE SERPL-SCNC: 110 MMOL/L (ref 95–110)
CO2 SERPL-SCNC: 23 MMOL/L (ref 23–29)
CREAT SERPL-MCNC: 1.4 MG/DL (ref 0.5–1.4)
EST. GFR  (NO RACE VARIABLE): 51.8 ML/MIN/1.73 M^2
GLUCOSE SERPL-MCNC: 99 MG/DL (ref 70–110)
POTASSIUM SERPL-SCNC: 4.2 MMOL/L (ref 3.5–5.1)
PROT SERPL-MCNC: 6.7 G/DL (ref 6–8.4)
SODIUM SERPL-SCNC: 141 MMOL/L (ref 136–145)

## 2023-11-21 RX ORDER — DOXAZOSIN 2 MG/1
4 TABLET ORAL NIGHTLY
Qty: 90 TABLET | Refills: 3 | Status: SHIPPED | OUTPATIENT
Start: 2023-11-21 | End: 2024-11-20

## 2023-11-21 NOTE — TELEPHONE ENCOUNTER
No care due was identified.  Health [facility] Embedded Care Due Messages. Reference number: [id number].   11/21/2023 10:23:02 AM CST

## 2023-11-29 ENCOUNTER — OFFICE VISIT (OUTPATIENT)
Dept: FAMILY MEDICINE | Facility: CLINIC | Age: 78
End: 2023-11-29
Payer: MEDICARE

## 2023-11-29 ENCOUNTER — LAB VISIT (OUTPATIENT)
Dept: LAB | Facility: HOSPITAL | Age: 78
End: 2023-11-29
Attending: INTERNAL MEDICINE
Payer: MEDICARE

## 2023-11-29 VITALS
HEART RATE: 73 BPM | TEMPERATURE: 98 F | RESPIRATION RATE: 18 BRPM | SYSTOLIC BLOOD PRESSURE: 120 MMHG | DIASTOLIC BLOOD PRESSURE: 78 MMHG | OXYGEN SATURATION: 98 % | BODY MASS INDEX: 25.46 KG/M2 | WEIGHT: 172.38 LBS

## 2023-11-29 DIAGNOSIS — R42 DIZZINESS AND GIDDINESS: ICD-10-CM

## 2023-11-29 DIAGNOSIS — R42 DIZZINESS: Primary | ICD-10-CM

## 2023-11-29 DIAGNOSIS — R42 DIZZINESS: ICD-10-CM

## 2023-11-29 DIAGNOSIS — R26.89 BALANCE PROBLEM: ICD-10-CM

## 2023-11-29 DIAGNOSIS — R09.89 CAROTID BRUIT, UNSPECIFIED LATERALITY: ICD-10-CM

## 2023-11-29 DIAGNOSIS — H91.90 HEARING LOSS, UNSPECIFIED HEARING LOSS TYPE, UNSPECIFIED LATERALITY: ICD-10-CM

## 2023-11-29 LAB
ALBUMIN SERPL BCP-MCNC: 3.9 G/DL (ref 3.5–5.2)
ALP SERPL-CCNC: 75 U/L (ref 55–135)
ALT SERPL W/O P-5'-P-CCNC: 33 U/L (ref 10–44)
ANION GAP SERPL CALC-SCNC: 7 MMOL/L (ref 8–16)
AST SERPL-CCNC: 32 U/L (ref 10–40)
BASOPHILS # BLD AUTO: 0.06 K/UL (ref 0–0.2)
BASOPHILS NFR BLD: 1.1 % (ref 0–1.9)
BILIRUB SERPL-MCNC: 0.6 MG/DL (ref 0.1–1)
BUN SERPL-MCNC: 28 MG/DL (ref 8–23)
CALCIUM SERPL-MCNC: 9.5 MG/DL (ref 8.7–10.5)
CHLORIDE SERPL-SCNC: 109 MMOL/L (ref 95–110)
CO2 SERPL-SCNC: 25 MMOL/L (ref 23–29)
CREAT SERPL-MCNC: 1.4 MG/DL (ref 0.5–1.4)
DIFFERENTIAL METHOD: ABNORMAL
EOSINOPHIL # BLD AUTO: 0.1 K/UL (ref 0–0.5)
EOSINOPHIL NFR BLD: 1.1 % (ref 0–8)
ERYTHROCYTE [DISTWIDTH] IN BLOOD BY AUTOMATED COUNT: 12.8 % (ref 11.5–14.5)
EST. GFR  (NO RACE VARIABLE): 51.8 ML/MIN/1.73 M^2
GLUCOSE SERPL-MCNC: 93 MG/DL (ref 70–110)
HCT VFR BLD AUTO: 40.3 % (ref 40–54)
HGB BLD-MCNC: 12.6 G/DL (ref 14–18)
IMM GRANULOCYTES # BLD AUTO: 0.01 K/UL (ref 0–0.04)
IMM GRANULOCYTES NFR BLD AUTO: 0.2 % (ref 0–0.5)
LYMPHOCYTES # BLD AUTO: 1.3 K/UL (ref 1–4.8)
LYMPHOCYTES NFR BLD: 25 % (ref 18–48)
MCH RBC QN AUTO: 30 PG (ref 27–31)
MCHC RBC AUTO-ENTMCNC: 31.3 G/DL (ref 32–36)
MCV RBC AUTO: 96 FL (ref 82–98)
MONOCYTES # BLD AUTO: 0.5 K/UL (ref 0.3–1)
MONOCYTES NFR BLD: 8.6 % (ref 4–15)
NEUTROPHILS # BLD AUTO: 3.3 K/UL (ref 1.8–7.7)
NEUTROPHILS NFR BLD: 64 % (ref 38–73)
NRBC BLD-RTO: 0 /100 WBC
PLATELET # BLD AUTO: 204 K/UL (ref 150–450)
PMV BLD AUTO: 10.9 FL (ref 9.2–12.9)
POTASSIUM SERPL-SCNC: 4.5 MMOL/L (ref 3.5–5.1)
PROT SERPL-MCNC: 6.9 G/DL (ref 6–8.4)
RBC # BLD AUTO: 4.2 M/UL (ref 4.6–6.2)
SODIUM SERPL-SCNC: 141 MMOL/L (ref 136–145)
WBC # BLD AUTO: 5.23 K/UL (ref 3.9–12.7)

## 2023-11-29 PROCEDURE — 1101F PR PT FALLS ASSESS DOC 0-1 FALLS W/OUT INJ PAST YR: ICD-10-PCS | Mod: CPTII,S$GLB,, | Performed by: INTERNAL MEDICINE

## 2023-11-29 PROCEDURE — 1126F AMNT PAIN NOTED NONE PRSNT: CPT | Mod: CPTII,S$GLB,, | Performed by: INTERNAL MEDICINE

## 2023-11-29 PROCEDURE — 3074F SYST BP LT 130 MM HG: CPT | Mod: CPTII,S$GLB,, | Performed by: INTERNAL MEDICINE

## 2023-11-29 PROCEDURE — 99214 PR OFFICE/OUTPT VISIT, EST, LEVL IV, 30-39 MIN: ICD-10-PCS | Mod: S$GLB,,, | Performed by: INTERNAL MEDICINE

## 2023-11-29 PROCEDURE — 1159F PR MEDICATION LIST DOCUMENTED IN MEDICAL RECORD: ICD-10-PCS | Mod: CPTII,S$GLB,, | Performed by: INTERNAL MEDICINE

## 2023-11-29 PROCEDURE — 80053 COMPREHEN METABOLIC PANEL: CPT | Performed by: INTERNAL MEDICINE

## 2023-11-29 PROCEDURE — 3078F DIAST BP <80 MM HG: CPT | Mod: CPTII,S$GLB,, | Performed by: INTERNAL MEDICINE

## 2023-11-29 PROCEDURE — 1126F PR PAIN SEVERITY QUANTIFIED, NO PAIN PRESENT: ICD-10-PCS | Mod: CPTII,S$GLB,, | Performed by: INTERNAL MEDICINE

## 2023-11-29 PROCEDURE — 3288F PR FALLS RISK ASSESSMENT DOCUMENTED: ICD-10-PCS | Mod: CPTII,S$GLB,, | Performed by: INTERNAL MEDICINE

## 2023-11-29 PROCEDURE — 99214 OFFICE O/P EST MOD 30 MIN: CPT | Mod: S$GLB,,, | Performed by: INTERNAL MEDICINE

## 2023-11-29 PROCEDURE — 99999 PR PBB SHADOW E&M-EST. PATIENT-LVL V: ICD-10-PCS | Mod: PBBFAC,,, | Performed by: INTERNAL MEDICINE

## 2023-11-29 PROCEDURE — 1101F PT FALLS ASSESS-DOCD LE1/YR: CPT | Mod: CPTII,S$GLB,, | Performed by: INTERNAL MEDICINE

## 2023-11-29 PROCEDURE — 85025 COMPLETE CBC W/AUTO DIFF WBC: CPT | Performed by: INTERNAL MEDICINE

## 2023-11-29 PROCEDURE — 1159F MED LIST DOCD IN RCRD: CPT | Mod: CPTII,S$GLB,, | Performed by: INTERNAL MEDICINE

## 2023-11-29 PROCEDURE — 99999 PR PBB SHADOW E&M-EST. PATIENT-LVL V: CPT | Mod: PBBFAC,,, | Performed by: INTERNAL MEDICINE

## 2023-11-29 PROCEDURE — 3288F FALL RISK ASSESSMENT DOCD: CPT | Mod: CPTII,S$GLB,, | Performed by: INTERNAL MEDICINE

## 2023-11-29 PROCEDURE — 3074F PR MOST RECENT SYSTOLIC BLOOD PRESSURE < 130 MM HG: ICD-10-PCS | Mod: CPTII,S$GLB,, | Performed by: INTERNAL MEDICINE

## 2023-11-29 PROCEDURE — 3078F PR MOST RECENT DIASTOLIC BLOOD PRESSURE < 80 MM HG: ICD-10-PCS | Mod: CPTII,S$GLB,, | Performed by: INTERNAL MEDICINE

## 2023-11-29 PROCEDURE — 36415 COLL VENOUS BLD VENIPUNCTURE: CPT | Mod: PO | Performed by: INTERNAL MEDICINE

## 2023-11-29 NOTE — PROGRESS NOTES
Subjective:       Patient ID: David Amaro is a 78 y.o. male.    Chief Complaint: balance and Dizziness    1 week----episodes of feeling dizzy--------off balance---------15-30 minutes------then resolves-------------no associated symptoms--------last occurred past Saturday------    Dizziness: no hearing loss, no ear pain, no fever, no headaches, no tinnitus, no nausea, no vomiting, no diaphoresis, no weakness, no light-headedness, no palpitations and no chest pain.    Past Medical History:   Diagnosis Date    Arthritis     Bilateral low back pain 9/2/2021    Cancer     prostate    CKD (chronic kidney disease)     Dyslipidemia     HTN (hypertension)     Hyperlipidemia     Hypothyroidism     Prostate cancer     Radiation induced proctitis     Thyroid disease      Past Surgical History:   Procedure Laterality Date    COLONOSCOPY N/A 4/3/2017    Procedure: COLONOSCOPY;  Surgeon: Olivier Grayson MD;  Location: [facility] ENDO;  Service: Endoscopy;  Laterality: N/A;    COLONOSCOPY N/A 8/3/2022    Procedure: COLONOSCOPY;  Surgeon: Ramila Burgess DO;  Location: [facility] ENDO;  Service: General;  Laterality: N/A;    HERNIA REPAIR  2009 approx    seed implants in prostate  02/2009    TONSILLECTOMY       Family History   Problem Relation Age of Onset    Stroke Mother     Cancer Mother     Colon cancer Mother     Diabetes Father     Cancer Sister         lung    Cancer Brother     Bone cancer Brother     Heart disease Maternal Grandmother      Social History     Socioeconomic History    Marital status:    Tobacco Use    Smoking status: Never    Smokeless tobacco: Never   Substance and Sexual Activity    Alcohol use: No    Drug use: No    Sexual activity: Yes     Partners: Female     Social Determinants of Health     Financial Resource Strain: Low Risk  (6/1/2022)    Overall Financial Resource Strain (CARDIA)     Difficulty of Paying Living Expenses: Not hard at all   Food Insecurity: No Food Insecurity (6/1/2022)    Hunger  Vital Sign     Worried About Running Out of Food in the Last Year: Never true     Ran Out of Food in the Last Year: Never true   Transportation Needs: No Transportation Needs (6/1/2022)    PRAPARE - Transportation     Lack of Transportation (Medical): No     Lack of Transportation (Non-Medical): No   Physical Activity: Insufficiently Active (6/1/2022)    Exercise Vital Sign     Days of Exercise per Week: 1 day     Minutes of Exercise per Session: 20 min   Stress: No Stress Concern Present (6/1/2022)    [employer] [employer] - Occupational Stress Questionnaire     Feeling of Stress : Not at all   Social Connections: Unknown (6/1/2022)    Social Connection and Isolation Panel [NHANES]     Frequency of Communication with Friends and Family: Three times a week     Frequency of Social Gatherings with Friends and Family: Once a week     Active Member of Clubs or Organizations: No     Attends Club or Organization Meetings: Never     Marital Status:    Housing Stability: Low Risk  (6/1/2022)    Housing Stability Vital Sign     Unable to Pay for Housing in the Last Year: No     Number of Places Lived in the Last Year: 1     Unstable Housing in the Last Year: No     Review of Systems   Constitutional:  Negative for activity change, appetite change, chills, diaphoresis, fatigue, fever and unexpected weight change.   HENT:  Negative for drooling, ear discharge, ear pain, facial swelling, hearing loss, mouth sores, nosebleeds, postnasal drip, rhinorrhea, sinus pressure, sneezing, sore throat, tinnitus, trouble swallowing and voice change.    Eyes:  Negative for photophobia, redness and visual disturbance.   Respiratory:  Negative for apnea, cough, choking, chest tightness, shortness of breath and wheezing.    Cardiovascular:  Negative for chest pain, palpitations and leg swelling.   Gastrointestinal:  Negative for abdominal distention, abdominal pain, anal bleeding, blood in stool, constipation,  diarrhea, nausea and vomiting.   Endocrine: Negative for cold intolerance, heat intolerance, polydipsia, polyphagia and polyuria.   Genitourinary:  Negative for difficulty urinating, dysuria, enuresis, flank pain, frequency, genital sores, hematuria and urgency.   Musculoskeletal:  Positive for arthralgias. Negative for back pain, gait problem, joint swelling, myalgias, neck pain and neck stiffness.   Skin:  Negative for color change, pallor, rash and wound.   Allergic/Immunologic: Negative for food allergies and immunocompromised state.   Neurological:  Positive for dizziness. Negative for tremors, seizures, syncope, facial asymmetry, speech difficulty, weakness, light-headedness, numbness and headaches.   Hematological:  Negative for adenopathy. Does not bruise/bleed easily.   Psychiatric/Behavioral:  Negative for agitation, behavioral problems, confusion, decreased concentration, dysphoric mood, hallucinations, self-injury, sleep disturbance and suicidal ideas. The patient is not nervous/anxious and is not hyperactive.        Objective:      Physical Exam  Vitals and nursing note reviewed.   Constitutional:       General: He is not in acute distress.     Appearance: Normal appearance. He is well-developed. He is not diaphoretic.   HENT:      Head: Normocephalic and atraumatic.      Right Ear: External ear normal.      Left Ear: External ear normal.      Mouth/Throat:      Pharynx: No oropharyngeal exudate.   Eyes:      General: No scleral icterus.     Pupils: Pupils are equal, round, and reactive to light.   Neck:      Thyroid: No thyromegaly.      Vascular: No carotid bruit or JVD.      Trachea: No tracheal deviation.   Cardiovascular:      Rate and Rhythm: Normal rate and regular rhythm.      Heart sounds: Normal heart sounds.   Pulmonary:      Effort: Pulmonary effort is normal. No respiratory distress.      Breath sounds: Normal breath sounds. No wheezing or rales.   Chest:      Chest wall: No tenderness.    Abdominal:      General: Bowel sounds are normal. There is no distension.      Palpations: Abdomen is soft.      Tenderness: There is no abdominal tenderness. There is no guarding or rebound.   Musculoskeletal:         General: No tenderness. Normal range of motion.      Cervical back: Normal range of motion and neck supple.   Lymphadenopathy:      Cervical: No cervical adenopathy.   Skin:     General: Skin is warm and dry.      Coloration: Skin is not pale.      Findings: No erythema or rash.   Neurological:      Mental Status: He is alert and oriented to person, place, and time.      Cranial Nerves: No cranial nerve deficit.      Coordination: Coordination normal.   Psychiatric:         Behavior: Behavior normal.         Thought Content: Thought content normal.         Judgment: Judgment normal.         CMP  Sodium   Date Value Ref Range Status   10/13/2023 141 136 - 145 mmol/L Final     Potassium   Date Value Ref Range Status   10/13/2023 4.2 3.5 - 5.1 mmol/L Final     Chloride   Date Value Ref Range Status   10/13/2023 110 95 - 110 mmol/L Final     CO2   Date Value Ref Range Status   10/13/2023 23 23 - 29 mmol/L Final     Glucose   Date Value Ref Range Status   10/13/2023 99 70 - 110 mg/dL Final     BUN   Date Value Ref Range Status   10/13/2023 22 8 - 23 mg/dL Final     Creatinine   Date Value Ref Range Status   10/13/2023 1.4 0.5 - 1.4 mg/dL Final     Calcium   Date Value Ref Range Status   10/13/2023 9.1 8.7 - 10.5 mg/dL Final     Total Protein   Date Value Ref Range Status   10/13/2023 6.7 6.0 - 8.4 g/dL Final     Albumin   Date Value Ref Range Status   10/13/2023 3.7 3.5 - 5.2 g/dL Final     Total Bilirubin   Date Value Ref Range Status   10/13/2023 0.2 0.1 - 1.0 mg/dL Final     Comment:     For infants and newborns, interpretation of results should be based  on gestational age, weight and in agreement with clinical  observations.    Premature Infant recommended reference ranges:  Up to 24  hours.............<8.0 mg/dL  Up to 48 hours............<12.0 mg/dL  3-5 days..................<15.0 mg/dL  6-29 days.................<15.0 mg/dL       Alkaline Phosphatase   Date Value Ref Range Status   10/13/2023 79 55 - 135 U/L Final     AST   Date Value Ref Range Status   10/13/2023 23 10 - 40 U/L Final     ALT   Date Value Ref Range Status   10/13/2023 26 10 - 44 U/L Final     Anion Gap   Date Value Ref Range Status   10/13/2023 8 8 - 16 mmol/L Final     eGFR if    Date Value Ref Range Status   01/14/2022 >60.0 >60 mL/min/1.73 m^2 Final     eGFR if non    Date Value Ref Range Status   01/14/2022 53.4 (A) >60 mL/min/1.73 m^2 Final     Comment:     Calculation used to obtain the estimated glomerular filtration  rate (eGFR) is the CKD-EPI equation.        Lab Results   Component Value Date    WBC 6.36 09/25/2023    HGB 13.1 (L) 09/25/2023    HCT 41.9 09/25/2023    MCV 96 09/25/2023     09/25/2023     Lab Results   Component Value Date    CHOL 192 05/18/2022     Lab Results   Component Value Date    HDL 77 (H) 05/18/2022     Lab Results   Component Value Date    LDLCALC 109.8 05/18/2022     Lab Results   Component Value Date    TRIG 26 (L) 05/18/2022     Lab Results   Component Value Date    CHOLHDL 40.1 05/18/2022     Lab Results   Component Value Date    TSH 1.880 03/22/2023     Lab Results   Component Value Date    HGBA1C 5.7 12/20/2005     Assessment:       1. Dizziness    2. Balance problem    3. Dizziness and giddiness    4. Hearing loss, unspecified hearing loss type, unspecified laterality    5. Carotid bruit, unspecified laterality        Plan:   Dizziness  -     Comprehensive Metabolic Panel; Future; Expected date: 11/29/2023  -     CBC Auto Differential; Future; Expected date: 11/29/2023  -     Ambulatory referral/consult to ENT; Future; Expected date: 12/06/2023    Balance problem    Dizziness and giddiness  -     CT Head Without Contrast; Future; Expected date:  11/29/2023    Hearing loss, unspecified hearing loss type, unspecified laterality  -     Ambulatory referral/consult to Audiology; Future; Expected date: 12/06/2023    Carotid bruit, unspecified laterality  -     US Carotid Bilateral; Future; Expected date: 11/29/2023      Stable------------------continue current meds----------------as above------------------f/u with his cards dr cunningham-----------------------------f/u 1 month--------

## 2023-11-30 ENCOUNTER — HOSPITAL ENCOUNTER (OUTPATIENT)
Dept: RADIOLOGY | Facility: HOSPITAL | Age: 78
Discharge: HOME | End: 2023-11-30
Attending: INTERNAL MEDICINE
Payer: MEDICARE

## 2023-11-30 DIAGNOSIS — R09.89 CAROTID BRUIT, UNSPECIFIED LATERALITY: ICD-10-CM

## 2023-11-30 PROCEDURE — 93880 US CAROTID BILATERAL: ICD-10-PCS | Mod: 26,,, | Performed by: RADIOLOGY

## 2023-11-30 PROCEDURE — 93880 EXTRACRANIAL BILAT STUDY: CPT | Mod: TC

## 2023-11-30 PROCEDURE — 93880 EXTRACRANIAL BILAT STUDY: CPT | Mod: 26,,, | Performed by: RADIOLOGY

## 2023-12-04 ENCOUNTER — HOSPITAL ENCOUNTER (OUTPATIENT)
Dept: RADIOLOGY | Facility: HOSPITAL | Age: 78
Discharge: HOME | End: 2023-12-04
Attending: INTERNAL MEDICINE
Payer: MEDICARE

## 2023-12-04 DIAGNOSIS — R42 DIZZINESS AND GIDDINESS: ICD-10-CM

## 2023-12-04 PROCEDURE — 70450 CT HEAD/BRAIN W/O DYE: CPT | Mod: 26,,, | Performed by: RADIOLOGY

## 2023-12-04 PROCEDURE — 70450 CT HEAD WITHOUT CONTRAST: ICD-10-PCS | Mod: 26,,, | Performed by: RADIOLOGY

## 2023-12-04 PROCEDURE — 70450 CT HEAD/BRAIN W/O DYE: CPT | Mod: TC

## 2023-12-15 ENCOUNTER — CLINICAL SUPPORT (OUTPATIENT)
Dept: AUDIOLOGY | Facility: CLINIC | Age: 78
End: 2023-12-15
Payer: MEDICARE

## 2023-12-15 ENCOUNTER — OFFICE VISIT (OUTPATIENT)
Dept: OTOLARYNGOLOGY | Facility: CLINIC | Age: 78
End: 2023-12-15
Payer: MEDICARE

## 2023-12-15 VITALS — WEIGHT: 172.38 LBS | BODY MASS INDEX: 25.53 KG/M2 | HEIGHT: 69 IN

## 2023-12-15 DIAGNOSIS — H90.3 SENSORINEURAL HEARING LOSS (SNHL) OF BOTH EARS: ICD-10-CM

## 2023-12-15 DIAGNOSIS — R42 DIZZINESS: ICD-10-CM

## 2023-12-15 DIAGNOSIS — H90.3 SENSORINEURAL HEARING LOSS (SNHL), BILATERAL: Primary | ICD-10-CM

## 2023-12-15 PROCEDURE — 1101F PT FALLS ASSESS-DOCD LE1/YR: CPT | Mod: CPTII,S$GLB,, | Performed by: STUDENT IN AN ORGANIZED HEALTH CARE EDUCATION/TRAINING PROGRAM

## 2023-12-15 PROCEDURE — 99214 OFFICE O/P EST MOD 30 MIN: CPT | Mod: S$GLB,,, | Performed by: STUDENT IN AN ORGANIZED HEALTH CARE EDUCATION/TRAINING PROGRAM

## 2023-12-15 PROCEDURE — 3288F FALL RISK ASSESSMENT DOCD: CPT | Mod: CPTII,S$GLB,, | Performed by: STUDENT IN AN ORGANIZED HEALTH CARE EDUCATION/TRAINING PROGRAM

## 2023-12-15 PROCEDURE — 1101F PR PT FALLS ASSESS DOC 0-1 FALLS W/OUT INJ PAST YR: ICD-10-PCS | Mod: CPTII,S$GLB,, | Performed by: STUDENT IN AN ORGANIZED HEALTH CARE EDUCATION/TRAINING PROGRAM

## 2023-12-15 PROCEDURE — 99999 PR PBB SHADOW E&M-EST. PATIENT-LVL III: ICD-10-PCS | Mod: PBBFAC,,, | Performed by: STUDENT IN AN ORGANIZED HEALTH CARE EDUCATION/TRAINING PROGRAM

## 2023-12-15 PROCEDURE — 99999 PR PBB SHADOW E&M-EST. PATIENT-LVL I: ICD-10-PCS | Mod: PBBFAC,,,

## 2023-12-15 PROCEDURE — 99999 PR PBB SHADOW E&M-EST. PATIENT-LVL I: CPT | Mod: PBBFAC,,,

## 2023-12-15 PROCEDURE — 99999 PR PBB SHADOW E&M-EST. PATIENT-LVL III: CPT | Mod: PBBFAC,,, | Performed by: STUDENT IN AN ORGANIZED HEALTH CARE EDUCATION/TRAINING PROGRAM

## 2023-12-15 PROCEDURE — 1126F AMNT PAIN NOTED NONE PRSNT: CPT | Mod: CPTII,S$GLB,, | Performed by: STUDENT IN AN ORGANIZED HEALTH CARE EDUCATION/TRAINING PROGRAM

## 2023-12-15 PROCEDURE — 3288F PR FALLS RISK ASSESSMENT DOCUMENTED: ICD-10-PCS | Mod: CPTII,S$GLB,, | Performed by: STUDENT IN AN ORGANIZED HEALTH CARE EDUCATION/TRAINING PROGRAM

## 2023-12-15 PROCEDURE — 1159F PR MEDICATION LIST DOCUMENTED IN MEDICAL RECORD: ICD-10-PCS | Mod: CPTII,S$GLB,, | Performed by: STUDENT IN AN ORGANIZED HEALTH CARE EDUCATION/TRAINING PROGRAM

## 2023-12-15 PROCEDURE — 1159F MED LIST DOCD IN RCRD: CPT | Mod: CPTII,S$GLB,, | Performed by: STUDENT IN AN ORGANIZED HEALTH CARE EDUCATION/TRAINING PROGRAM

## 2023-12-15 PROCEDURE — 92567 TYMPANOMETRY: CPT | Mod: S$GLB,,,

## 2023-12-15 PROCEDURE — 99214 PR OFFICE/OUTPT VISIT, EST, LEVL IV, 30-39 MIN: ICD-10-PCS | Mod: S$GLB,,, | Performed by: STUDENT IN AN ORGANIZED HEALTH CARE EDUCATION/TRAINING PROGRAM

## 2023-12-15 PROCEDURE — 92557 PR COMPREHENSIVE HEARING TEST: ICD-10-PCS | Mod: S$GLB,,,

## 2023-12-15 PROCEDURE — 92557 COMPREHENSIVE HEARING TEST: CPT | Mod: S$GLB,,,

## 2023-12-15 PROCEDURE — 1126F PR PAIN SEVERITY QUANTIFIED, NO PAIN PRESENT: ICD-10-PCS | Mod: CPTII,S$GLB,, | Performed by: STUDENT IN AN ORGANIZED HEALTH CARE EDUCATION/TRAINING PROGRAM

## 2023-12-15 PROCEDURE — 92567 PR TYMPA2METRY: ICD-10-PCS | Mod: S$GLB,,,

## 2023-12-15 NOTE — PROGRESS NOTES
Chief complaint:   Chief Complaint   Patient presents with    Dizziness     Pt has been dizzy off and on since 11/26/2023         Referring Provider:  Stepan Pierre Md  [id number] [place] [address]    History of Present Illness:     Mr. Amaro is a 78 y.o. male presenting for evaluation of dizziness.     Onset: 3-4 weeks ago, no inciting incident   Started with walking off balanced and lightheaded. Lasted for a few days. Then on-and-off for a few days, but now resolved.   Exacerbating factors:  walking  Prior Medications: nothing  Relieving factors:  remaining motionless  Quality: lightheaded, poor balance   Prior history of similar events: Yes - true vertigo in the past which self resolved many years ago, improved with canalith repositioning    Associated signs and symptoms:    [x] Hearing loss - bilateral, progressive  [] Ear fullness  [] Tinnitus  [] Headache  [] Light sensitivity  [] Sound sensitivity  [] Nausea   [] Vomiting  [] Dizziness with valsalva or weather change  [] Autophony    The patient denies significant hearing loss risk factors, ototoxic or vestibulotoxic medication exposure, chronic vestibular suppressant use, head/ facial/ bobby trauma, and otologic surgery.      Did holter monitor for 24 hours and EKG.      History        Past Medical History:   Past Medical History:   Diagnosis Date    Arthritis     Bilateral low back pain 9/2/2021    Cancer     prostate    CKD (chronic kidney disease)     Dyslipidemia     HTN (hypertension)     Hyperlipidemia     Hypothyroidism     Prostate cancer     Radiation induced proctitis     Thyroid disease     .          Past Surgical History:  Past Surgical History:   Procedure Laterality Date    COLONOSCOPY N/A 4/3/2017    Procedure: COLONOSCOPY;  Surgeon: Olivier Grayson MD;  Location: [facility];  Service: Endoscopy;  Laterality: N/A;    COLONOSCOPY N/A 8/3/2022    Procedure: COLONOSCOPY;  Surgeon: Ramila Burgess DO;  Location: [facility]  ENDO;  Service: General;  Laterality: N/A;    HERNIA REPAIR  2009 approx    seed implants in prostate  02/2009    TONSILLECTOMY     .         Medications: Medication list was reviewed. He  has a current medication list which includes the following prescription(s): amlodipine, doxazosin, ergocalciferol, and levothyroxine.         Allergies:   Review of patient's allergies indicates:   Allergen Reactions    No known drug allergies             Family history: family history includes Bone cancer in his brother; Cancer in his brother, mother, and sister; Colon cancer in his mother; Diabetes in his father; Heart disease in his maternal grandmother; Stroke in his mother.         Social History          Alcohol use:  reports no history of alcohol use.            Tobacco:  reports that he has never smoked. He has never used smokeless tobacco.         Please see the patient's intake form for full details of past medical history, past surgical history, family history, social history and review of systems. ?This information was reviewed by me and noted.      Physical Examination     There were no vitals filed for this visit.     General: Well developed, well nourished, well hydrated. Verbal with a strong voice and not dysphonic.     Head/Face: Normocephalic, atraumatic. No scars or lesions. Facial musculature equal.     Eyes: No scleral icterus or conjunctival hemorrhage. EOMI. PERRLA.     Ears:     Right ear: No gross deformity. EAC is clear of debris and erythema. The TM is intact with a pneumatized middle ear. No signs of retraction, fluid or infection.      Left ear: No gross deformity. EAC is clear of debris and erythema. The TM is intact with a pneumatized middle ear. No signs of retraction, fluid or infection.     Neurologic: Moving all extremities without gross abnormality.CN II-XII grossly intact. House-Brackmann 1/6.     Motor strength:  Strength 5/5 throughout.    Sensation:  Sensory function to light touch and  proprioception intact throughout.    Gait:  No ataxia and can tandem gait 6 steps.    Romberg test:  No abnormal sway or falls with eyes open and closed.     Finger-to-nose testing intact without dysmetria.    Nystagmus - none present with left and right lateral gaze    [place] Hallpike - no torsional nystagmus    Head impulse testing: no evidence of catch up saccades    Test of vertical skew: no abnormal visual skew with covering of left or right eye       Data review    Audiogram     Audiogram was independently reviewed       Imaging    I have independently reviewed the following imaging with the findings noted below:      US carotid  Impression:     No evidence of a hemodynamically significant carotid bifurcation stenosis.    CT head  Clear middle ear and mastoids    Assessment     1. Sensorineural hearing loss (SNHL), bilateral    2. Dizziness          Plan:      Dizziness is an extremely complex problem that may arise from many sources.  I requires the coordination between the visual system, the vestibular system as well as the proprioreceptive system.  Additionally, balance is compromised in the setting of musculoskeletal, cerebral, cardiac, and numerous physiologic disorders.  The complex interplay between these systems may also lead to dizziness if there is dysynchrony between the bilateral vestibular symptoms.    The diagnosis and options of management were discussed at length with the patient, including hearing, balance function and the risks associated with my recommendations. We spent a considerable amount of time discussing strategies to cope with dizziness. I emphasized the great importance of fall avoidance and activities that may be dangerous if David has an episode of dizziness.  I answered all questions in layman's terms. Based on the history, physical exam and imaging studies there is little evidence of a vestibular dysfunction.  I recommend continud workup per cardiolgoy. If negative cardiology  workup, but symptoms contnue would recommend neurology evaluation.    -HEARING LOSS-  I spent a considerable amount of time educating the patient on hearing and hearing loss.  We discussed the basic characteristics of conductive hearing loss versus sensorineural hearing loss and the significant differences in treatment options between the two categories.      We discussed that in cases of conductive hearing loss, this suggests a mechanical disorder that sometimes can be improved with medications &/or surgery.  It may occur secondary to external ear pathology (atresia, otitis externa, etc), tympanic membrane disorders (large perforations, immobility due to scarring or eustachian tube dysfunction), and middle ear disorders (effusions, ossicular disorders).    Sensorineural hearing loss is the expected hearing loss pattern with aging, but some disorders such as Meniere's may accelerate this process.  Additionally, amplification with hearing aids is generally the best option for hearing rehabilitation, except where the hearing loss is profound.  We discussed that this generally does not represent a dangerous condition, but in cases where there is a large discrepancy between the two ears in terms of nerve function, more investigation is often necessary due to the possiblity of vestibular schwannomas or meningiomas at the cerebellopontine angle.  The definitive test for this is an MRI with gadolinium.  However, these masses are usually very slow growing (1-2mm/year), so patients may elect to repeat an audiogram in about 6 months or obtain an ABR so long as they understand that this may result in a delay in diagnosis (although very unlikely that this would have a significant clinical impact on their outcome due to the slow growing nature of these masses) with the understanding that if ABR is abnormal or asymmetry increases, an MRI would then be required.    David  presents with what appears to be sensorineural hearing  loss.  Based on this, my recommendation is hearing aids when motivated. Also discussed hearing preservation.          Kota Sanchez MD  [facility]   [facility] - [address]  [place] [id number]  P: [phone]  F: [phone]

## 2023-12-15 NOTE — PROGRESS NOTES
Referring Provider: Dr. Stepan Pierre    David Amaro was seen 12/15/2023 for an audiological evaluation. Patient reported he recently had a hearing test at [facility] and would like a second opinion. He had issues with dizziness, described as lightheadedness, and imbalance in November which seems to have resolved. Patient has a history of noise exposure with use of hearing protection. Patient denied tinnitus and family history of hearing loss.    Otoscopy revealed clear canals with visualization of the tympanic membrane in both ears. Tympanograms were Type A for the right ear and Type A for the left ear. Audiometry revealed normal hearing sensitivity through 500 Hz sloping to a mild to moderate sensorineural hearing loss in both ears. Speech Reception Thresholds were  40 dBHL for the right ear and 30 dBHL for the left ear. Word recognition scores were good for the right ear and excellent for the left ear.    Patient was counseled on the above findings.    Recommendations:  Follow-up with ENT, as scheduled.  Repeat audiological evaluation in one to two years to monitor hearing, or sooner if needed.  Recommend bilateral amplification. Provided a copy of audiogram. Advised patient to contact insurance regarding hearing aid benefits.

## 2023-12-28 ENCOUNTER — OFFICE VISIT (OUTPATIENT)
Dept: FAMILY MEDICINE | Facility: CLINIC | Age: 78
End: 2023-12-28
Payer: MEDICARE

## 2023-12-28 VITALS
HEART RATE: 68 BPM | SYSTOLIC BLOOD PRESSURE: 120 MMHG | OXYGEN SATURATION: 100 % | BODY MASS INDEX: 25.43 KG/M2 | RESPIRATION RATE: 18 BRPM | WEIGHT: 172.19 LBS | DIASTOLIC BLOOD PRESSURE: 80 MMHG | TEMPERATURE: 98 F

## 2023-12-28 DIAGNOSIS — E78.5 DYSLIPIDEMIA: ICD-10-CM

## 2023-12-28 DIAGNOSIS — I10 PRIMARY HYPERTENSION: Primary | ICD-10-CM

## 2023-12-28 DIAGNOSIS — E03.9 ACQUIRED HYPOTHYROIDISM: ICD-10-CM

## 2023-12-28 PROCEDURE — 3079F PR MOST RECENT DIASTOLIC BLOOD PRESSURE 80-89 MM HG: ICD-10-PCS | Mod: CPTII,S$GLB,, | Performed by: INTERNAL MEDICINE

## 2023-12-28 PROCEDURE — 3288F FALL RISK ASSESSMENT DOCD: CPT | Mod: CPTII,S$GLB,, | Performed by: INTERNAL MEDICINE

## 2023-12-28 PROCEDURE — 99213 PR OFFICE/OUTPT VISIT, EST, LEVL III, 20-29 MIN: ICD-10-PCS | Mod: S$GLB,,, | Performed by: INTERNAL MEDICINE

## 2023-12-28 PROCEDURE — 1125F AMNT PAIN NOTED PAIN PRSNT: CPT | Mod: CPTII,S$GLB,, | Performed by: INTERNAL MEDICINE

## 2023-12-28 PROCEDURE — 3079F DIAST BP 80-89 MM HG: CPT | Mod: CPTII,S$GLB,, | Performed by: INTERNAL MEDICINE

## 2023-12-28 PROCEDURE — 99999 PR PBB SHADOW E&M-EST. PATIENT-LVL III: ICD-10-PCS | Mod: PBBFAC,,, | Performed by: INTERNAL MEDICINE

## 2023-12-28 PROCEDURE — 3074F PR MOST RECENT SYSTOLIC BLOOD PRESSURE < 130 MM HG: ICD-10-PCS | Mod: CPTII,S$GLB,, | Performed by: INTERNAL MEDICINE

## 2023-12-28 PROCEDURE — 3074F SYST BP LT 130 MM HG: CPT | Mod: CPTII,S$GLB,, | Performed by: INTERNAL MEDICINE

## 2023-12-28 PROCEDURE — 1101F PT FALLS ASSESS-DOCD LE1/YR: CPT | Mod: CPTII,S$GLB,, | Performed by: INTERNAL MEDICINE

## 2023-12-28 PROCEDURE — 99999 PR PBB SHADOW E&M-EST. PATIENT-LVL III: CPT | Mod: PBBFAC,,, | Performed by: INTERNAL MEDICINE

## 2023-12-28 PROCEDURE — 99213 OFFICE O/P EST LOW 20 MIN: CPT | Mod: S$GLB,,, | Performed by: INTERNAL MEDICINE

## 2023-12-28 PROCEDURE — 3288F PR FALLS RISK ASSESSMENT DOCUMENTED: ICD-10-PCS | Mod: CPTII,S$GLB,, | Performed by: INTERNAL MEDICINE

## 2023-12-28 PROCEDURE — 1159F PR MEDICATION LIST DOCUMENTED IN MEDICAL RECORD: ICD-10-PCS | Mod: CPTII,S$GLB,, | Performed by: INTERNAL MEDICINE

## 2023-12-28 PROCEDURE — 1125F PR PAIN SEVERITY QUANTIFIED, PAIN PRESENT: ICD-10-PCS | Mod: CPTII,S$GLB,, | Performed by: INTERNAL MEDICINE

## 2023-12-28 PROCEDURE — 1159F MED LIST DOCD IN RCRD: CPT | Mod: CPTII,S$GLB,, | Performed by: INTERNAL MEDICINE

## 2023-12-28 PROCEDURE — 1101F PR PT FALLS ASSESS DOC 0-1 FALLS W/OUT INJ PAST YR: ICD-10-PCS | Mod: CPTII,S$GLB,, | Performed by: INTERNAL MEDICINE

## 2023-12-28 NOTE — PROGRESS NOTES
Subjective:       Patient ID: David Amaro is a 78 y.o. male.    Chief Complaint: Follow-up (1m), Hypertension, Hyperlipidemia, and Dizziness    Dizziness resolved-------------no longer feels off balance---------------------    Follow-up  Associated symptoms include arthralgias. Pertinent negatives include no abdominal pain, chest pain, chills, coughing, diaphoresis, fatigue, fever, headaches, joint swelling, myalgias, nausea, neck pain, numbness, rash, sore throat, vomiting or weakness.   Hypertension  Pertinent negatives include no chest pain, headaches, neck pain, palpitations or shortness of breath.   Hyperlipidemia  Pertinent negatives include no chest pain, myalgias or shortness of breath.   Dizziness: no hearing loss, no ear pain, no fever, no headaches, no tinnitus, no nausea, no vomiting, no diaphoresis, no weakness, no light-headedness, no palpitations and no chest pain.    Past Medical History:   Diagnosis Date    Arthritis     Bilateral low back pain 9/2/2021    Cancer     prostate    CKD (chronic kidney disease)     Dyslipidemia     HTN (hypertension)     Hyperlipidemia     Hypothyroidism     Prostate cancer     Radiation induced proctitis     Thyroid disease      Past Surgical History:   Procedure Laterality Date    COLONOSCOPY N/A 4/3/2017    Procedure: COLONOSCOPY;  Surgeon: Olivier Grayson MD;  Location: [facility] ENDO;  Service: Endoscopy;  Laterality: N/A;    COLONOSCOPY N/A 8/3/2022    Procedure: COLONOSCOPY;  Surgeon: Ramila Burgess DO;  Location: [facility] ENDO;  Service: General;  Laterality: N/A;    HERNIA REPAIR  2009 approx    seed implants in prostate  02/2009    TONSILLECTOMY       Family History   Problem Relation Age of Onset    Stroke Mother     Cancer Mother     Colon cancer Mother     Diabetes Father     Cancer Sister         lung    Cancer Brother     Bone cancer Brother     Heart disease Maternal Grandmother      Social History     Socioeconomic History    Marital status:     Tobacco Use    Smoking status: Never    Smokeless tobacco: Never   Substance and Sexual Activity    Alcohol use: No    Drug use: No    Sexual activity: Yes     Partners: Female     Social Determinants of Health     Financial Resource Strain: Low Risk  (6/1/2022)    Overall Financial Resource Strain (CARDIA)     Difficulty of Paying Living Expenses: Not hard at all   Food Insecurity: No Food Insecurity (6/1/2022)    Hunger Vital Sign     Worried About Running Out of Food in the Last Year: Never true     Ran Out of Food in the Last Year: Never true   Transportation Needs: No Transportation Needs (6/1/2022)    PRAPARE - Transportation     Lack of Transportation (Medical): No     Lack of Transportation (Non-Medical): No   Physical Activity: Insufficiently Active (6/1/2022)    Exercise Vital Sign     Days of Exercise per Week: 1 day     Minutes of Exercise per Session: 20 min   Stress: No Stress Concern Present (6/1/2022)    Citizen of [place] [employer] - Occupational Stress Questionnaire     Feeling of Stress : Not at all   Social Connections: Unknown (6/1/2022)    Social Connection and Isolation Panel [NHANES]     Frequency of Communication with Friends and Family: Three times a week     Frequency of Social Gatherings with Friends and Family: Once a week     Active Member of Clubs or Organizations: No     Attends Club or Organization Meetings: Never     Marital Status:    Housing Stability: Low Risk  (6/1/2022)    Housing Stability Vital Sign     Unable to Pay for Housing in the Last Year: No     Number of Places Lived in the Last Year: 1     Unstable Housing in the Last Year: No     Review of Systems   Constitutional:  Negative for activity change, appetite change, chills, diaphoresis, fatigue, fever and unexpected weight change.   HENT:  Negative for drooling, ear discharge, ear pain, facial swelling, hearing loss, mouth sores, nosebleeds, postnasal drip, rhinorrhea, sinus pressure, sneezing,  sore throat, tinnitus, trouble swallowing and voice change.    Eyes:  Negative for photophobia, redness and visual disturbance.   Respiratory:  Negative for apnea, cough, choking, chest tightness, shortness of breath and wheezing.    Cardiovascular:  Negative for chest pain, palpitations and leg swelling.   Gastrointestinal:  Negative for abdominal distention, abdominal pain, anal bleeding, blood in stool, constipation, diarrhea, nausea, rectal pain and vomiting.   Endocrine: Negative for cold intolerance, heat intolerance, polydipsia, polyphagia and polyuria.   Genitourinary:  Negative for difficulty urinating, dysuria, enuresis, flank pain, frequency, genital sores, hematuria and urgency.   Musculoskeletal:  Positive for arthralgias. Negative for back pain, gait problem, joint swelling, myalgias, neck pain and neck stiffness.   Skin:  Negative for color change, pallor, rash and wound.   Allergic/Immunologic: Negative for food allergies and immunocompromised state.   Neurological:  Negative for dizziness, tremors, seizures, syncope, facial asymmetry, speech difficulty, weakness, light-headedness, numbness and headaches.   Hematological:  Negative for adenopathy. Does not bruise/bleed easily.   Psychiatric/Behavioral:  Negative for agitation, behavioral problems, confusion, decreased concentration, dysphoric mood, hallucinations, self-injury, sleep disturbance and suicidal ideas. The patient is not nervous/anxious and is not hyperactive.        Objective:      Physical Exam  Vitals and nursing note reviewed.   Constitutional:       General: He is not in acute distress.     Appearance: Normal appearance. He is well-developed. He is not diaphoretic.   HENT:      Head: Normocephalic and atraumatic.      Mouth/Throat:      Pharynx: No oropharyngeal exudate.   Eyes:      General: No scleral icterus.     Pupils: Pupils are equal, round, and reactive to light.   Neck:      Thyroid: No thyromegaly.      Vascular: No carotid  bruit or JVD.      Trachea: No tracheal deviation.   Cardiovascular:      Rate and Rhythm: Normal rate and regular rhythm.      Heart sounds: Normal heart sounds.   Pulmonary:      Effort: Pulmonary effort is normal. No respiratory distress.      Breath sounds: Normal breath sounds. No wheezing or rales.   Chest:      Chest wall: No tenderness.   Abdominal:      General: Bowel sounds are normal. There is no distension.      Palpations: Abdomen is soft.      Tenderness: There is no abdominal tenderness. There is no guarding or rebound.   Musculoskeletal:         General: No tenderness. Normal range of motion.      Cervical back: Normal range of motion and neck supple.   Lymphadenopathy:      Cervical: No cervical adenopathy.   Skin:     General: Skin is warm and dry.      Coloration: Skin is not pale.      Findings: No erythema or rash.   Neurological:      Mental Status: He is alert and oriented to person, place, and time.   Psychiatric:         Behavior: Behavior normal.         Thought Content: Thought content normal.         Judgment: Judgment normal.         CMP  Sodium   Date Value Ref Range Status   11/29/2023 141 136 - 145 mmol/L Final     Potassium   Date Value Ref Range Status   11/29/2023 4.5 3.5 - 5.1 mmol/L Final     Chloride   Date Value Ref Range Status   11/29/2023 109 95 - 110 mmol/L Final     CO2   Date Value Ref Range Status   11/29/2023 25 23 - 29 mmol/L Final     Glucose   Date Value Ref Range Status   11/29/2023 93 70 - 110 mg/dL Final     BUN   Date Value Ref Range Status   11/29/2023 28 (H) 8 - 23 mg/dL Final     Creatinine   Date Value Ref Range Status   11/29/2023 1.4 0.5 - 1.4 mg/dL Final     Calcium   Date Value Ref Range Status   11/29/2023 9.5 8.7 - 10.5 mg/dL Final     Total Protein   Date Value Ref Range Status   11/29/2023 6.9 6.0 - 8.4 g/dL Final     Albumin   Date Value Ref Range Status   11/29/2023 3.9 3.5 - 5.2 g/dL Final     Total Bilirubin   Date Value Ref Range Status    11/29/2023 0.6 0.1 - 1.0 mg/dL Final     Comment:     For infants and newborns, interpretation of results should be based  on gestational age, weight and in agreement with clinical  observations.    Premature Infant recommended reference ranges:  Up to 24 hours.............<8.0 mg/dL  Up to 48 hours............<12.0 mg/dL  3-5 days..................<15.0 mg/dL  6-29 days.................<15.0 mg/dL       Alkaline Phosphatase   Date Value Ref Range Status   11/29/2023 75 55 - 135 U/L Final     AST   Date Value Ref Range Status   11/29/2023 32 10 - 40 U/L Final     ALT   Date Value Ref Range Status   11/29/2023 33 10 - 44 U/L Final     Anion Gap   Date Value Ref Range Status   11/29/2023 7 (L) 8 - 16 mmol/L Final     eGFR if    Date Value Ref Range Status   01/14/2022 >60.0 >60 mL/min/1.73 m^2 Final     eGFR if non    Date Value Ref Range Status   01/14/2022 53.4 (A) >60 mL/min/1.73 m^2 Final     Comment:     Calculation used to obtain the estimated glomerular filtration  rate (eGFR) is the CKD-EPI equation.        Lab Results   Component Value Date    WBC 5.23 11/29/2023    HGB 12.6 (L) 11/29/2023    HCT 40.3 11/29/2023    MCV 96 11/29/2023     11/29/2023     Lab Results   Component Value Date    CHOL 192 05/18/2022     Lab Results   Component Value Date    HDL 77 (H) 05/18/2022     Lab Results   Component Value Date    LDLCALC 109.8 05/18/2022     Lab Results   Component Value Date    TRIG 26 (L) 05/18/2022     Lab Results   Component Value Date    CHOLHDL 40.1 05/18/2022     Lab Results   Component Value Date    TSH 1.880 03/22/2023     Lab Results   Component Value Date    HGBA1C 5.7 12/20/2005     Assessment:       1. Primary hypertension    2. Dyslipidemia    3. Acquired hypothyroidism        Plan:   Primary hypertension    Dyslipidemia    Acquired hypothyroidism      Stable------------------continue current meds---------------------f/u as scheduled-

## 2024-02-18 NOTE — TELEPHONE ENCOUNTER
Care Due:                  Date            Visit Type   Department     Provider  --------------------------------------------------------------------------------                                EP -                              PRIMARY      [facility]  Last Visit: 12-      CARE ([facility])   MEDICINE       Stepan Pierre                              EP -                              PRIMARY      [facility]  Next Visit: 03-      CARE ([facility])   EUSEBIO Pierre                                                            Last  Test          Frequency    Reason                     Performed    Due Date  --------------------------------------------------------------------------------    Vitamin D...  12 months..  ergocalciferol...........  Not Found    Overdue    [employer] Embedded Care Due Messages. Reference number: [id number].   2/18/2024 2:01:40 PM CST

## 2024-02-19 RX ORDER — ERGOCALCIFEROL 1.25 MG/1
50000 CAPSULE ORAL
Qty: 12 CAPSULE | Refills: 3 | Status: SHIPPED | OUTPATIENT
Start: 2024-02-19

## 2024-02-25 RX ORDER — DOXAZOSIN 4 MG/1
TABLET ORAL
Qty: 90 TABLET | Refills: 3 | OUTPATIENT
Start: 2024-02-25

## 2024-02-25 NOTE — TELEPHONE ENCOUNTER
Refill Decision Note   David Vincentus  is requesting a refill authorization.  Brief Assessment and Rationale for Refill:  Quick Discontinue     Medication Therapy Plan:         Comments:     Note composed:4:12 PM 02/25/2024

## 2024-02-25 NOTE — TELEPHONE ENCOUNTER
No care due was identified.  [facility] Embedded Care Due Messages. Reference number: [id number].   2/25/2024 3:10:35 PM CST

## 2024-03-01 ENCOUNTER — LAB VISIT (OUTPATIENT)
Dept: LAB | Facility: HOSPITAL | Age: 79
End: 2024-03-01
Attending: INTERNAL MEDICINE
Payer: MEDICARE

## 2024-03-01 DIAGNOSIS — I10 PRIMARY HYPERTENSION: ICD-10-CM

## 2024-03-01 LAB
ALBUMIN SERPL BCP-MCNC: 3.5 G/DL (ref 3.5–5.2)
ANION GAP SERPL CALC-SCNC: 10 MMOL/L (ref 8–16)
BUN SERPL-MCNC: 20 MG/DL (ref 8–23)
CALCIUM SERPL-MCNC: 8.8 MG/DL (ref 8.7–10.5)
CHLORIDE SERPL-SCNC: 107 MMOL/L (ref 95–110)
CO2 SERPL-SCNC: 26 MMOL/L (ref 23–29)
CREAT SERPL-MCNC: 1.3 MG/DL (ref 0.5–1.4)
EST. GFR  (NO RACE VARIABLE): 56.6 ML/MIN/1.73 M^2
GLUCOSE SERPL-MCNC: 91 MG/DL (ref 70–110)
PHOSPHATE SERPL-MCNC: 2.7 MG/DL (ref 2.7–4.5)
POTASSIUM SERPL-SCNC: 3.8 MMOL/L (ref 3.5–5.1)
SODIUM SERPL-SCNC: 143 MMOL/L (ref 136–145)

## 2024-03-01 PROCEDURE — 80069 RENAL FUNCTION PANEL: CPT | Performed by: INTERNAL MEDICINE

## 2024-03-01 PROCEDURE — 36415 COLL VENOUS BLD VENIPUNCTURE: CPT | Performed by: INTERNAL MEDICINE

## 2024-03-04 ENCOUNTER — OFFICE VISIT (OUTPATIENT)
Dept: NEPHROLOGY | Facility: CLINIC | Age: 79
End: 2024-03-04
Payer: MEDICARE

## 2024-03-04 VITALS
BODY MASS INDEX: 25.4 KG/M2 | WEIGHT: 171.5 LBS | DIASTOLIC BLOOD PRESSURE: 70 MMHG | RESPIRATION RATE: 18 BRPM | HEART RATE: 55 BPM | SYSTOLIC BLOOD PRESSURE: 128 MMHG | HEIGHT: 69 IN

## 2024-03-04 DIAGNOSIS — N18.30 STAGE 3 CHRONIC KIDNEY DISEASE, UNSPECIFIED WHETHER STAGE 3A OR 3B CKD: Primary | ICD-10-CM

## 2024-03-04 DIAGNOSIS — N18.31 STAGE 3A CHRONIC KIDNEY DISEASE: ICD-10-CM

## 2024-03-04 PROCEDURE — 1159F MED LIST DOCD IN RCRD: CPT | Mod: CPTII,S$GLB,, | Performed by: INTERNAL MEDICINE

## 2024-03-04 PROCEDURE — 3078F DIAST BP <80 MM HG: CPT | Mod: CPTII,S$GLB,, | Performed by: INTERNAL MEDICINE

## 2024-03-04 PROCEDURE — 3288F FALL RISK ASSESSMENT DOCD: CPT | Mod: CPTII,S$GLB,, | Performed by: INTERNAL MEDICINE

## 2024-03-04 PROCEDURE — 1101F PT FALLS ASSESS-DOCD LE1/YR: CPT | Mod: CPTII,S$GLB,, | Performed by: INTERNAL MEDICINE

## 2024-03-04 PROCEDURE — 1126F AMNT PAIN NOTED NONE PRSNT: CPT | Mod: CPTII,S$GLB,, | Performed by: INTERNAL MEDICINE

## 2024-03-04 PROCEDURE — 3074F SYST BP LT 130 MM HG: CPT | Mod: CPTII,S$GLB,, | Performed by: INTERNAL MEDICINE

## 2024-03-04 PROCEDURE — 99999 PR PBB SHADOW E&M-EST. PATIENT-LVL III: CPT | Mod: PBBFAC,,, | Performed by: INTERNAL MEDICINE

## 2024-03-04 PROCEDURE — 99214 OFFICE O/P EST MOD 30 MIN: CPT | Mod: S$GLB,,, | Performed by: INTERNAL MEDICINE

## 2024-03-04 NOTE — PROGRESS NOTES
Renal clinic f/u note:  Date of clinic visit: 3/4/24  Reason for f/u and chief c/o: CKD     HPI: Pt is a 79 y/o male with CKD stage 3 and HTN, who presents for f/u. Pt was last seen in renal clinic about 1 year ago. Chart was reviewed. Pt feels well today, no acute or new c/o's, no discomfort, no symptoms reported. Medical h/o, labs and meds reviewed with pt.  Noted pt says that cardiologist (Dr. Lucien Hackett) increase amlodipine from 2.5 to 5 mg. His legs started to swell. He stopped amlodipine all together about 2 months ago. His BP has remained controlled despite not being on amlodipine at all. No other BP meds.    PAST MEDICAL HISTORY:  CKD stage 3, HTN, Arthritis, Dyslipidemia, Hyperlipidemia, Hypothyroidism, Prostate cancer, Radiation induced proctitis, and Thyroid disease.     PAST SURGICAL HISTORY:  He  has a past surgical history that includes Hernia repair (2009 approx); seed implants in prostate (02/2009); Tonsillectomy; and Colonoscopy (N/A, 4/3/2017).     SOCIAL HISTORY:  He  reports that he has never smoked. He has never used smokeless tobacco. He reports that he does not drink alcohol and does not use drugs.     FAMILY MEDICAL HISTORY:  His family history includes Bone cancer in his brother; Cancer in his brother, mother, and sister; Colon cancer in his mother; Diabetes in his father; Heart disease in his maternal grandmother; Stroke in his mother.             Review of patient's allergies indicates:   Allergen Reactions    No known drug allergies        Meds reviewed     Current Outpatient Medications:     doxazosin (CARDURA) 2 MG tablet, Take 2 tablets (4 mg total) by mouth every evening., Disp: 90 tablet, Rfl: 3    ergocalciferol (ERGOCALCIFEROL) 50,000 unit Cap, Take 1 capsule (50,000 Units total) by mouth every 7 days., Disp: 12 capsule, Rfl: 3    levothyroxine (SYNTHROID) 25 MCG tablet, TAKE 1 TABLET(25 MCG) BY MOUTH EVERY DAY, Disp: 90 tablet, Rfl: 3           REVIEW OF SYSTEMS:  Patient has  "no fever, fatigue, visual changes, chest pain, edema, cough, dyspnea, nausea, vomiting, constipation, diarrhea, arthralgias, pruritis, dizziness, weakness, depression, confusion.     PHYSICAL EXAM:  Blood pressure 128/70, pulse (!) 55, resp. rate 18, height 5' 9" (1.753 m), weight 77.8 kg (171 lb 8.3 oz).  Gen:    WDWN male in no apparent distress  Psych: Normal mood and affect  Skin:    No rashes or ulcers  Neck:   No JVD  Chest:  Clear with no rales, rhonchi, wheezing with normal effort  CV:      Regular with no murmurs, gallops or rubs  Abd:     Soft, nontender, no distension  Ext:      No  edema     Labs reviewed  BMP  Lab Results   Component Value Date     03/01/2024    K 3.8 03/01/2024     03/01/2024    CO2 26 03/01/2024    BUN 20 03/01/2024    CREATININE 1.3 03/01/2024    CALCIUM 8.8 03/01/2024    ANIONGAP 10 03/01/2024    EGFRNORACEVR 56.6 (A) 03/01/2024     Lab Results   Component Value Date    WBC 5.23 11/29/2023    HGB 12.6 (L) 11/29/2023    HCT 40.3 11/29/2023    MCV 96 11/29/2023     11/29/2023        U/a: no protein, no blood  Urine prot/cr unremarkable     Renal u/s 2018 reviewed  Right kidney: The right kidney measures 10.2 cm. No cortical thinning. No loss of corticomedullary distinction. There is a 10 mm simple cyst at the upper pole on the right.  No solid renal masses visualized.  No renal stone. No hydronephrosis.  Left kidney: The left kidney measures 10.1 cm. No cortical thinning. No loss of corticomedullary distinction. Small simple peripelvic cyst noted at the lower pole appears unchanged in size measuring approximately 12 mm.  No solid renal lesions visualized.  No renal stone. No hydronephrosis           IMPRESSION AND RECOMMENDATIONS:  77 y/o male with mild CKD presents for f/u:     1. Renal: s Cr stable, at baseline.  Stable renal function. CKD 3.  H/o of HTN  Pt has age related decline in kidney function  In addition, has a h/o of HTN, likely hypertensive " nephrosclerosis  No proteinuria  No hematuria  K normal  Na normal  Ca normal  Acid base stable     Renal u/s report reviewed  Has benign, simple cysts, o/w unremarkable     2. HTN: BP controlled without any BP meds  Discussed with pt  Meds reviewed  Pt self-deced amlodipine (was taking 5 mg per day), BP has remained well controlled  Will leave amlodipine off     3. Life style, diet, physical activity reviewed     Plans and recommendations:   As discussed above  Total time spent 30 minutes including time needed to review the records, the   patient evaluation, documentation, face-to-face discussion with the patient,   more than 50% of the time was spent on coordination of care and counseling.    Level IV visit.  RTC 1 year     Arabella Ruff MD

## 2024-04-17 ENCOUNTER — LAB VISIT (OUTPATIENT)
Dept: LAB | Facility: HOSPITAL | Age: 79
End: 2024-04-17
Attending: INTERNAL MEDICINE
Payer: MEDICARE

## 2024-04-17 ENCOUNTER — OFFICE VISIT (OUTPATIENT)
Dept: FAMILY MEDICINE | Facility: CLINIC | Age: 79
End: 2024-04-17
Payer: MEDICARE

## 2024-04-17 VITALS
OXYGEN SATURATION: 96 % | HEART RATE: 77 BPM | RESPIRATION RATE: 18 BRPM | DIASTOLIC BLOOD PRESSURE: 72 MMHG | TEMPERATURE: 97 F | WEIGHT: 165.13 LBS | HEIGHT: 69 IN | BODY MASS INDEX: 24.46 KG/M2 | SYSTOLIC BLOOD PRESSURE: 132 MMHG

## 2024-04-17 DIAGNOSIS — N18.31 STAGE 3A CHRONIC KIDNEY DISEASE: ICD-10-CM

## 2024-04-17 DIAGNOSIS — E78.5 DYSLIPIDEMIA: ICD-10-CM

## 2024-04-17 DIAGNOSIS — E03.9 ACQUIRED HYPOTHYROIDISM: ICD-10-CM

## 2024-04-17 DIAGNOSIS — M79.605 PAIN OF LEFT LOWER EXTREMITY: ICD-10-CM

## 2024-04-17 DIAGNOSIS — Z85.46 HISTORY OF PROSTATE CANCER: ICD-10-CM

## 2024-04-17 DIAGNOSIS — D64.9 ANEMIA, UNSPECIFIED TYPE: ICD-10-CM

## 2024-04-17 DIAGNOSIS — I10 PRIMARY HYPERTENSION: Primary | ICD-10-CM

## 2024-04-17 PROBLEM — N25.81 HYPERPARATHYROIDISM, SECONDARY RENAL: Status: RESOLVED | Noted: 2018-11-14 | Resolved: 2024-04-17

## 2024-04-17 PROBLEM — E23.6 EMPTY SELLA: Status: RESOLVED | Noted: 2018-11-20 | Resolved: 2024-04-17

## 2024-04-17 LAB
CHOLEST SERPL-MCNC: 201 MG/DL (ref 120–199)
CHOLEST/HDLC SERPL: 2.9 {RATIO} (ref 2–5)
HDLC SERPL-MCNC: 70 MG/DL (ref 40–75)
HDLC SERPL: 34.8 % (ref 20–50)
LDLC SERPL CALC-MCNC: 124.2 MG/DL (ref 63–159)
NONHDLC SERPL-MCNC: 131 MG/DL
TRIGL SERPL-MCNC: 34 MG/DL (ref 30–150)
TSH SERPL DL<=0.005 MIU/L-ACNC: 2.88 UIU/ML (ref 0.4–4)

## 2024-04-17 PROCEDURE — 36415 COLL VENOUS BLD VENIPUNCTURE: CPT | Mod: PO | Performed by: INTERNAL MEDICINE

## 2024-04-17 PROCEDURE — 99214 OFFICE O/P EST MOD 30 MIN: CPT | Mod: S$GLB,,, | Performed by: INTERNAL MEDICINE

## 2024-04-17 PROCEDURE — 80061 LIPID PANEL: CPT | Performed by: INTERNAL MEDICINE

## 2024-04-17 PROCEDURE — 1101F PT FALLS ASSESS-DOCD LE1/YR: CPT | Mod: CPTII,S$GLB,, | Performed by: INTERNAL MEDICINE

## 2024-04-17 PROCEDURE — 1159F MED LIST DOCD IN RCRD: CPT | Mod: CPTII,S$GLB,, | Performed by: INTERNAL MEDICINE

## 2024-04-17 PROCEDURE — 1126F AMNT PAIN NOTED NONE PRSNT: CPT | Mod: CPTII,S$GLB,, | Performed by: INTERNAL MEDICINE

## 2024-04-17 PROCEDURE — 3288F FALL RISK ASSESSMENT DOCD: CPT | Mod: CPTII,S$GLB,, | Performed by: INTERNAL MEDICINE

## 2024-04-17 PROCEDURE — 84443 ASSAY THYROID STIM HORMONE: CPT | Performed by: INTERNAL MEDICINE

## 2024-04-17 PROCEDURE — 3075F SYST BP GE 130 - 139MM HG: CPT | Mod: CPTII,S$GLB,, | Performed by: INTERNAL MEDICINE

## 2024-04-17 PROCEDURE — 99999 PR PBB SHADOW E&M-EST. PATIENT-LVL III: CPT | Mod: PBBFAC,,, | Performed by: INTERNAL MEDICINE

## 2024-04-17 PROCEDURE — 3078F DIAST BP <80 MM HG: CPT | Mod: CPTII,S$GLB,, | Performed by: INTERNAL MEDICINE

## 2024-04-17 NOTE — PROGRESS NOTES
Subjective:       Patient ID: David Amaro is a 78 y.o. male.    Chief Complaint: Follow-up, Hypertension, Hyperlipidemia, and Hypothyroidism    Follow-up  Associated symptoms include arthralgias. Pertinent negatives include no abdominal pain, chest pain, chills, coughing, diaphoresis, fatigue, fever, headaches, joint swelling, myalgias, nausea, neck pain, numbness, rash, sore throat, vomiting or weakness.   Hypertension  Pertinent negatives include no chest pain, headaches, neck pain, palpitations or shortness of breath.   Hyperlipidemia  Pertinent negatives include no chest pain, myalgias or shortness of breath.     Past Medical History:   Diagnosis Date    Arthritis     Bilateral low back pain 9/2/2021    Cancer     prostate    CKD (chronic kidney disease)     Dyslipidemia     HTN (hypertension)     Hyperlipidemia     Hypothyroidism     Prostate cancer     Radiation induced proctitis     Thyroid disease      Past Surgical History:   Procedure Laterality Date    COLONOSCOPY N/A 4/3/2017    Procedure: COLONOSCOPY;  Surgeon: Olivier Grayson MD;  Location: [facility];  Service: Endoscopy;  Laterality: N/A;    COLONOSCOPY N/A 8/3/2022    Procedure: COLONOSCOPY;  Surgeon: Ramila Burgess DO;  Location: [facility];  Service: General;  Laterality: N/A;    HERNIA REPAIR  2009 approx    seed implants in prostate  02/2009    TONSILLECTOMY       Family History   Problem Relation Name Age of Onset    Stroke Mother      Cancer Mother      Colon cancer Mother      Diabetes Father      Cancer Sister          lung    Cancer Brother      Bone cancer Brother      Heart disease Maternal Grandmother       Social History     Socioeconomic History    Marital status:    Tobacco Use    Smoking status: Never    Smokeless tobacco: Never   Substance and Sexual Activity    Alcohol use: No    Drug use: No    Sexual activity: Yes     Partners: Female     Social Determinants of Health     Financial Resource Strain: Low Risk   (6/1/2022)    Overall Financial Resource Strain (CARDIA)     Difficulty of Paying Living Expenses: Not hard at all   Food Insecurity: No Food Insecurity (6/1/2022)    Hunger Vital Sign     Worried About Running Out of Food in the Last Year: Never true     Ran Out of Food in the Last Year: Never true   Transportation Needs: No Transportation Needs (6/1/2022)    PRAPARE - Transportation     Lack of Transportation (Medical): No     Lack of Transportation (Non-Medical): No   Physical Activity: Insufficiently Active (6/1/2022)    Exercise Vital Sign     Days of Exercise per Week: 1 day     Minutes of Exercise per Session: 20 min   Stress: No Stress Concern Present (6/1/2022)    [employer] - Occupational Stress Questionnaire     Feeling of Stress : Not at all   Social Connections: Unknown (6/1/2022)    Social Connection and Isolation Panel [NHANES]     Frequency of Communication with Friends and Family: Three times a week     Frequency of Social Gatherings with Friends and Family: Once a week     Active Member of Clubs or Organizations: No     Attends Club or Organization Meetings: Never     Marital Status:    Housing Stability: Low Risk  (6/1/2022)    Housing Stability Vital Sign     Unable to Pay for Housing in the Last Year: No     Number of Places Lived in the Last Year: 1     Unstable Housing in the Last Year: No     Review of Systems   Constitutional:  Negative for activity change, appetite change, chills, diaphoresis, fatigue, fever and unexpected weight change.   HENT:  Negative for drooling, ear discharge, ear pain, facial swelling, hearing loss, mouth sores, nosebleeds, postnasal drip, rhinorrhea, sinus pressure, sneezing, sore throat, tinnitus, trouble swallowing and voice change.    Eyes:  Negative for photophobia, redness and visual disturbance.   Respiratory:  Negative for apnea, cough, choking, chest tightness, shortness of breath and wheezing.    Cardiovascular:   Negative for chest pain, palpitations and leg swelling.   Gastrointestinal:  Negative for abdominal distention, abdominal pain, anal bleeding, blood in stool, constipation, diarrhea, nausea, rectal pain and vomiting.   Endocrine: Negative for cold intolerance, heat intolerance, polydipsia, polyphagia and polyuria.   Genitourinary:  Negative for difficulty urinating, dysuria, enuresis, flank pain, frequency, genital sores, hematuria and urgency.   Musculoskeletal:  Positive for arthralgias. Negative for back pain, gait problem, joint swelling, myalgias, neck pain and neck stiffness.   Skin:  Negative for color change, pallor, rash and wound.   Allergic/Immunologic: Negative for food allergies and immunocompromised state.   Neurological:  Negative for dizziness, tremors, seizures, syncope, facial asymmetry, speech difficulty, weakness, light-headedness, numbness and headaches.   Hematological:  Negative for adenopathy. Does not bruise/bleed easily.   Psychiatric/Behavioral:  Negative for agitation, behavioral problems, confusion, decreased concentration, dysphoric mood, hallucinations, self-injury, sleep disturbance and suicidal ideas. The patient is not nervous/anxious and is not hyperactive.        Objective:      Physical Exam  Vitals and nursing note reviewed.   Constitutional:       General: He is not in acute distress.     Appearance: Normal appearance. He is well-developed. He is not diaphoretic.   HENT:      Head: Normocephalic and atraumatic.      Mouth/Throat:      Pharynx: No oropharyngeal exudate.   Eyes:      General: No scleral icterus.     Pupils: Pupils are equal, round, and reactive to light.   Neck:      Thyroid: No thyromegaly.      Vascular: No carotid bruit or JVD.      Trachea: No tracheal deviation.   Cardiovascular:      Rate and Rhythm: Normal rate and regular rhythm.      Heart sounds: Normal heart sounds.   Pulmonary:      Effort: Pulmonary effort is normal. No respiratory distress.       Breath sounds: Normal breath sounds. No wheezing or rales.   Chest:      Chest wall: No tenderness.   Abdominal:      General: Bowel sounds are normal. There is no distension.      Palpations: Abdomen is soft.      Tenderness: There is no abdominal tenderness. There is no guarding or rebound.   Musculoskeletal:         General: No tenderness. Normal range of motion.      Cervical back: Normal range of motion and neck supple.   Lymphadenopathy:      Cervical: No cervical adenopathy.   Skin:     General: Skin is warm and dry.      Coloration: Skin is not pale.      Findings: No erythema or rash.   Neurological:      Mental Status: He is alert and oriented to person, place, and time.   Psychiatric:         Behavior: Behavior normal.         Thought Content: Thought content normal.         Judgment: Judgment normal.         CMP  Sodium   Date Value Ref Range Status   03/01/2024 143 136 - 145 mmol/L Final     Potassium   Date Value Ref Range Status   03/01/2024 3.8 3.5 - 5.1 mmol/L Final     Chloride   Date Value Ref Range Status   03/01/2024 107 95 - 110 mmol/L Final     CO2   Date Value Ref Range Status   03/01/2024 26 23 - 29 mmol/L Final     Glucose   Date Value Ref Range Status   03/01/2024 91 70 - 110 mg/dL Final     BUN   Date Value Ref Range Status   03/01/2024 20 8 - 23 mg/dL Final     Creatinine   Date Value Ref Range Status   03/01/2024 1.3 0.5 - 1.4 mg/dL Final     Calcium   Date Value Ref Range Status   03/01/2024 8.8 8.7 - 10.5 mg/dL Final     Total Protein   Date Value Ref Range Status   11/29/2023 6.9 6.0 - 8.4 g/dL Final     Albumin   Date Value Ref Range Status   03/01/2024 3.5 3.5 - 5.2 g/dL Final     Total Bilirubin   Date Value Ref Range Status   11/29/2023 0.6 0.1 - 1.0 mg/dL Final     Comment:     For infants and newborns, interpretation of results should be based  on gestational age, weight and in agreement with clinical  observations.    Premature Infant recommended reference ranges:  Up to 24  hours.............<8.0 mg/dL  Up to 48 hours............<12.0 mg/dL  3-5 days..................<15.0 mg/dL  6-29 days.................<15.0 mg/dL       Alkaline Phosphatase   Date Value Ref Range Status   11/29/2023 75 55 - 135 U/L Final     AST   Date Value Ref Range Status   11/29/2023 32 10 - 40 U/L Final     ALT   Date Value Ref Range Status   11/29/2023 33 10 - 44 U/L Final     Anion Gap   Date Value Ref Range Status   03/01/2024 10 8 - 16 mmol/L Final     eGFR if    Date Value Ref Range Status   01/14/2022 >60.0 >60 mL/min/1.73 m^2 Final     eGFR if non    Date Value Ref Range Status   01/14/2022 53.4 (A) >60 mL/min/1.73 m^2 Final     Comment:     Calculation used to obtain the estimated glomerular filtration  rate (eGFR) is the CKD-EPI equation.        Lab Results   Component Value Date    WBC 5.23 11/29/2023    HGB 12.6 (L) 11/29/2023    HCT 40.3 11/29/2023    MCV 96 11/29/2023     11/29/2023     Lab Results   Component Value Date    CHOL 192 05/18/2022     Lab Results   Component Value Date    HDL 77 (H) 05/18/2022     Lab Results   Component Value Date    LDLCALC 109.8 05/18/2022     Lab Results   Component Value Date    TRIG 26 (L) 05/18/2022     Lab Results   Component Value Date    CHOLHDL 40.1 05/18/2022     Lab Results   Component Value Date    TSH 1.880 03/22/2023     Lab Results   Component Value Date    HGBA1C 5.7 12/20/2005     Assessment:       1. Primary hypertension    2. Dyslipidemia    3. Acquired hypothyroidism    4. History of prostate cancer    5. Anemia, unspecified type    6. Stage 3a chronic kidney disease    7. Pain of left lower extremity        Plan:   Primary hypertension    Dyslipidemia  -     Lipid Panel; Future; Expected date: 04/17/2024    Acquired hypothyroidism  -     TSH; Future; Expected date: 04/17/2024    History of prostate cancer---------------------------sees urology-----------    Anemia, unspecified type    Stage 3a chronic kidney  disease    Pain of left lower extremity  -     CV Ultrasound doppler venous DVT leg left; Future      Stable--------------continue meds----------------as above---------------------f/u 6 months-

## 2024-04-22 ENCOUNTER — HOSPITAL ENCOUNTER (OUTPATIENT)
Dept: CARDIOLOGY | Facility: HOSPITAL | Age: 79
Discharge: HOME | End: 2024-04-22
Attending: INTERNAL MEDICINE
Payer: MEDICARE

## 2024-04-22 VITALS
SYSTOLIC BLOOD PRESSURE: 132 MMHG | WEIGHT: 165 LBS | BODY MASS INDEX: 24.44 KG/M2 | HEIGHT: 69 IN | DIASTOLIC BLOOD PRESSURE: 72 MMHG

## 2024-04-22 DIAGNOSIS — M79.605 PAIN OF LEFT LOWER EXTREMITY: ICD-10-CM

## 2024-04-22 PROCEDURE — 93971 EXTREMITY STUDY: CPT | Mod: LT

## 2024-04-22 PROCEDURE — 93971 EXTREMITY STUDY: CPT | Mod: 26,LT,, | Performed by: INTERNAL MEDICINE

## 2024-05-02 NOTE — PATIENT INSTRUCTIONS
1.  Acute kidney injury likely from dehydration.  Much improved after oral hydration. ACEI for ever     2.  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  GFR based on Cystatin C with a corresponding creatinine of 1.5 is equal to 59%.    Urology to follow up on the left-sided hydronephrosis.    3.  Essential hypertension:  Stable on Cardura at bedtime.    4.  BPH on Flomax    5. Dr. Velez to see for prostate issues     6.  Vitamin-D deficiency start on ergocalciferol weekly and recheck parathyroid hormone and calcium and phosphorus on follow-up in 3 months     Med Reconciliation

## 2024-05-30 RX ORDER — DOXAZOSIN 2 MG/1
TABLET ORAL
Qty: 90 TABLET | Refills: 3 | Status: SHIPPED | OUTPATIENT
Start: 2024-05-30

## 2024-05-30 NOTE — TELEPHONE ENCOUNTER
Provider Staff:  Action required for this patient    Requires appointment      Please see care gap opportunities below in Care Due Message.    Thanks!  [facility]     Appointments      Date Provider   Last Visit   4/17/2024 Stepan Pierre MD   Next Visit   10/17/2024 Stepan Pierre MD     Refill Decision Note   David Amaro  is requesting a refill authorization.  Brief Assessment and Rationale for Refill:  Approve     Medication Therapy Plan:         Comments:     Note composed:9:34 AM 05/30/2024

## 2024-05-30 NOTE — TELEPHONE ENCOUNTER
Care Due:                  Date            Visit Type   Department     Provider  --------------------------------------------------------------------------------                                EP -                              PRIMARY      [facility]  Last Visit: 04-      CARE ([facility])   MEDICINE       Stepan Pierre                              EP -                              PRIMARY      [facility]  Next Visit: 10-      CARE ([facility])   EUSE[employer] Pierre                                                            Last  Test          Frequency    Reason                     Performed    Due Date  --------------------------------------------------------------------------------    Vitamin D...  12 months..  ergocalciferol...........  Not Found    Overdue    [employer] Embedded Care Due Messages. Reference number: [id number].   5/30/2024 9:28:17 AM CDT

## 2024-06-17 RX ORDER — LEVOTHYROXINE SODIUM 25 UG/1
TABLET ORAL
Qty: 90 TABLET | Refills: 3 | Status: SHIPPED | OUTPATIENT
Start: 2024-06-17

## 2024-06-17 NOTE — TELEPHONE ENCOUNTER
Refill Decision Note   David Amaro  is requesting a refill authorization.  Brief Assessment and Rationale for Refill:  Approve     Medication Therapy Plan:  MRM resolved      Comments:     Note composed:4:15 AM 06/17/2024

## 2024-06-17 NOTE — TELEPHONE ENCOUNTER
No care due was identified.  [facility] Embedded Care Due Messages. Reference number: [id number].   6/16/2024 10:11:55 PM CDT

## 2024-07-31 ENCOUNTER — PATIENT MESSAGE (OUTPATIENT)
Dept: RESEARCH | Facility: HOSPITAL | Age: 79
End: 2024-07-31
Payer: MEDICARE

## 2024-10-17 ENCOUNTER — LAB VISIT (OUTPATIENT)
Dept: LAB | Facility: HOSPITAL | Age: 79
End: 2024-10-17
Attending: INTERNAL MEDICINE
Payer: MEDICARE

## 2024-10-17 ENCOUNTER — OFFICE VISIT (OUTPATIENT)
Dept: FAMILY MEDICINE | Facility: CLINIC | Age: 79
End: 2024-10-17
Payer: MEDICARE

## 2024-10-17 VITALS
HEIGHT: 69 IN | DIASTOLIC BLOOD PRESSURE: 80 MMHG | HEART RATE: 65 BPM | SYSTOLIC BLOOD PRESSURE: 138 MMHG | TEMPERATURE: 98 F | WEIGHT: 169.56 LBS | RESPIRATION RATE: 18 BRPM | BODY MASS INDEX: 25.11 KG/M2 | OXYGEN SATURATION: 99 %

## 2024-10-17 DIAGNOSIS — K40.90 UNILATERAL INGUINAL HERNIA WITHOUT OBSTRUCTION OR GANGRENE, RECURRENCE NOT SPECIFIED: ICD-10-CM

## 2024-10-17 DIAGNOSIS — I10 PRIMARY HYPERTENSION: ICD-10-CM

## 2024-10-17 DIAGNOSIS — Z23 NEED FOR VACCINATION: ICD-10-CM

## 2024-10-17 DIAGNOSIS — Z85.46 HISTORY OF PROSTATE CANCER: ICD-10-CM

## 2024-10-17 DIAGNOSIS — E78.5 DYSLIPIDEMIA: ICD-10-CM

## 2024-10-17 DIAGNOSIS — E03.9 ACQUIRED HYPOTHYROIDISM: ICD-10-CM

## 2024-10-17 DIAGNOSIS — N40.0 BENIGN PROSTATIC HYPERPLASIA WITHOUT LOWER URINARY TRACT SYMPTOMS: Primary | ICD-10-CM

## 2024-10-17 LAB
ANION GAP SERPL CALC-SCNC: 7 MMOL/L (ref 8–16)
BASOPHILS # BLD AUTO: 0.05 K/UL (ref 0–0.2)
BASOPHILS NFR BLD: 1 % (ref 0–1.9)
BUN SERPL-MCNC: 19 MG/DL (ref 8–23)
CALCIUM SERPL-MCNC: 9 MG/DL (ref 8.7–10.5)
CHLORIDE SERPL-SCNC: 109 MMOL/L (ref 95–110)
CO2 SERPL-SCNC: 25 MMOL/L (ref 23–29)
CREAT SERPL-MCNC: 1.3 MG/DL (ref 0.5–1.4)
DIFFERENTIAL METHOD BLD: ABNORMAL
EOSINOPHIL # BLD AUTO: 0.1 K/UL (ref 0–0.5)
EOSINOPHIL NFR BLD: 1.6 % (ref 0–8)
ERYTHROCYTE [DISTWIDTH] IN BLOOD BY AUTOMATED COUNT: 13 % (ref 11.5–14.5)
EST. GFR  (NO RACE VARIABLE): 56.2 ML/MIN/1.73 M^2
GLUCOSE SERPL-MCNC: 97 MG/DL (ref 70–110)
HCT VFR BLD AUTO: 40.6 % (ref 40–54)
HGB BLD-MCNC: 12.7 G/DL (ref 14–18)
IMM GRANULOCYTES # BLD AUTO: 0.01 K/UL (ref 0–0.04)
IMM GRANULOCYTES NFR BLD AUTO: 0.2 % (ref 0–0.5)
LYMPHOCYTES # BLD AUTO: 1.2 K/UL (ref 1–4.8)
LYMPHOCYTES NFR BLD: 24.9 % (ref 18–48)
MCH RBC QN AUTO: 30.2 PG (ref 27–31)
MCHC RBC AUTO-ENTMCNC: 31.3 G/DL (ref 32–36)
MCV RBC AUTO: 97 FL (ref 82–98)
MONOCYTES # BLD AUTO: 0.4 K/UL (ref 0.3–1)
MONOCYTES NFR BLD: 8.2 % (ref 4–15)
NEUTROPHILS # BLD AUTO: 3.2 K/UL (ref 1.8–7.7)
NEUTROPHILS NFR BLD: 64.1 % (ref 38–73)
NRBC BLD-RTO: 0 /100 WBC
PLATELET # BLD AUTO: 192 K/UL (ref 150–450)
PMV BLD AUTO: 10.1 FL (ref 9.2–12.9)
POTASSIUM SERPL-SCNC: 4.6 MMOL/L (ref 3.5–5.1)
RBC # BLD AUTO: 4.2 M/UL (ref 4.6–6.2)
SODIUM SERPL-SCNC: 141 MMOL/L (ref 136–145)
WBC # BLD AUTO: 4.97 K/UL (ref 3.9–12.7)

## 2024-10-17 PROCEDURE — 85025 COMPLETE CBC W/AUTO DIFF WBC: CPT | Performed by: INTERNAL MEDICINE

## 2024-10-17 PROCEDURE — 99999 PR PBB SHADOW E&M-EST. PATIENT-LVL IV: CPT | Mod: PBBFAC,,, | Performed by: INTERNAL MEDICINE

## 2024-10-17 PROCEDURE — 36415 COLL VENOUS BLD VENIPUNCTURE: CPT | Mod: PO | Performed by: INTERNAL MEDICINE

## 2024-10-17 PROCEDURE — 80048 BASIC METABOLIC PNL TOTAL CA: CPT | Performed by: INTERNAL MEDICINE

## 2024-10-17 NOTE — PROGRESS NOTES
Subjective:       Patient ID: David Amaro is a 79 y.o. male.    Chief Complaint: Follow-up, Hypertension, Hyperlipidemia, and Hypothyroidism    Follow-up  Associated symptoms include arthralgias. Pertinent negatives include no abdominal pain, chest pain, chills, coughing, diaphoresis, fatigue, fever, headaches, joint swelling, myalgias, nausea, neck pain, numbness, rash, sore throat, vomiting or weakness.   Hypertension  Pertinent negatives include no chest pain, headaches, neck pain, palpitations or shortness of breath.   Hyperlipidemia  Pertinent negatives include no chest pain, myalgias or shortness of breath.     Past Medical History:   Diagnosis Date    Arthritis     Bilateral low back pain 9/2/2021    Cancer     prostate    CKD (chronic kidney disease)     Dyslipidemia     HTN (hypertension)     Hyperlipidemia     Hypothyroidism     Prostate cancer     Radiation induced proctitis     Thyroid disease      Past Surgical History:   Procedure Laterality Date    COLONOSCOPY N/A 4/3/2017    Procedure: COLONOSCOPY;  Surgeon: Olivier Grayson MD;  Location: [facility];  Service: Endoscopy;  Laterality: N/A;    COLONOSCOPY N/A 8/3/2022    Procedure: COLONOSCOPY;  Surgeon: Ramila Burgess DO;  Location: [facility];  Service: General;  Laterality: N/A;    HERNIA REPAIR  2009 approx    seed implants in prostate  02/2009    TONSILLECTOMY       Family History   Problem Relation Name Age of Onset    Stroke Mother      Cancer Mother      Colon cancer Mother      Diabetes Father      Cancer Sister          lung    Cancer Brother      Bone cancer Brother      Heart disease Maternal Grandmother       Social History     Socioeconomic History    Marital status:    Tobacco Use    Smoking status: Never    Smokeless tobacco: Never   Substance and Sexual Activity    Alcohol use: No    Drug use: No    Sexual activity: Yes     Partners: Female     Social Drivers of Health     Financial Resource Strain: Low Risk  (6/1/2022)     Overall Financial Resource Strain (CARDIA)     Difficulty of Paying Living Expenses: Not hard at all   Food Insecurity: No Food Insecurity (6/1/2022)    Hunger Vital Sign     Worried About Running Out of Food in the Last Year: Never true     Ran Out of Food in the Last Year: Never true   Transportation Needs: No Transportation Needs (6/1/2022)    PRAPARE - Transportation     Lack of Transportation (Medical): No     Lack of Transportation (Non-Medical): No   Physical Activity: Insufficiently Active (6/1/2022)    Exercise Vital Sign     Days of Exercise per Week: 1 day     Minutes of Exercise per Session: 20 min   Stress: No Stress Concern Present (6/1/2022)    [employer] - Occupational Stress Questionnaire     Feeling of Stress : Not at all   Housing Stability: Low Risk  (6/1/2022)    Housing Stability Vital Sign     Unable to Pay for Housing in the Last Year: No     Number of Places Lived in the Last Year: 1     Unstable Housing in the Last Year: No     Review of Systems   Constitutional:  Negative for activity change, appetite change, chills, diaphoresis, fatigue, fever and unexpected weight change.   HENT:  Negative for drooling, ear discharge, ear pain, facial swelling, hearing loss, mouth sores, nosebleeds, postnasal drip, rhinorrhea, sinus pressure, sneezing, sore throat, tinnitus, trouble swallowing and voice change.    Eyes:  Negative for photophobia, redness and visual disturbance.   Respiratory:  Negative for apnea, cough, choking, chest tightness, shortness of breath and wheezing.    Cardiovascular:  Negative for chest pain, palpitations and leg swelling.   Gastrointestinal:  Negative for abdominal distention, abdominal pain, anal bleeding, blood in stool, constipation, diarrhea, nausea, rectal pain and vomiting.   Endocrine: Negative for cold intolerance, heat intolerance, polydipsia, polyphagia and polyuria.   Genitourinary:  Negative for difficulty urinating, dysuria,  enuresis, flank pain, frequency, genital sores, hematuria and urgency.   Musculoskeletal:  Positive for arthralgias. Negative for back pain, gait problem, joint swelling, myalgias, neck pain and neck stiffness.   Skin:  Negative for color change, pallor, rash and wound.   Allergic/Immunologic: Negative for food allergies and immunocompromised state.   Neurological:  Negative for dizziness, tremors, seizures, syncope, facial asymmetry, speech difficulty, weakness, light-headedness, numbness and headaches.   Hematological:  Negative for adenopathy. Does not bruise/bleed easily.   Psychiatric/Behavioral:  Negative for agitation, behavioral problems, confusion, decreased concentration, dysphoric mood, hallucinations, self-injury, sleep disturbance and suicidal ideas. The patient is not nervous/anxious and is not hyperactive.        Objective:      Physical Exam  Vitals and nursing note reviewed.   Constitutional:       General: He is not in acute distress.     Appearance: Normal appearance. He is well-developed. He is not diaphoretic.   HENT:      Head: Normocephalic and atraumatic.      Mouth/Throat:      Pharynx: No oropharyngeal exudate.   Eyes:      General: No scleral icterus.     Pupils: Pupils are equal, round, and reactive to light.   Neck:      Thyroid: No thyromegaly.      Vascular: No carotid bruit or JVD.      Trachea: No tracheal deviation.   Cardiovascular:      Rate and Rhythm: Normal rate and regular rhythm.      Heart sounds: Normal heart sounds.   Pulmonary:      Effort: Pulmonary effort is normal. No respiratory distress.      Breath sounds: Normal breath sounds. No wheezing or rales.   Chest:      Chest wall: No tenderness.   Abdominal:      General: Bowel sounds are normal. There is no distension.      Palpations: Abdomen is soft.      Tenderness: There is no abdominal tenderness. There is no guarding or rebound.   Musculoskeletal:         General: No tenderness. Normal range of motion.       Cervical back: Normal range of motion and neck supple.   Lymphadenopathy:      Cervical: No cervical adenopathy.   Skin:     General: Skin is warm and dry.      Coloration: Skin is not pale.      Findings: No erythema or rash.   Neurological:      Mental Status: He is alert and oriented to person, place, and time.      Cranial Nerves: No cranial nerve deficit.      Coordination: Coordination normal.   Psychiatric:         Behavior: Behavior normal.         Thought Content: Thought content normal.         Judgment: Judgment normal.         CMP  Sodium   Date Value Ref Range Status   03/01/2024 143 136 - 145 mmol/L Final     Potassium   Date Value Ref Range Status   03/01/2024 3.8 3.5 - 5.1 mmol/L Final     Chloride   Date Value Ref Range Status   03/01/2024 107 95 - 110 mmol/L Final     CO2   Date Value Ref Range Status   03/01/2024 26 23 - 29 mmol/L Final     Glucose   Date Value Ref Range Status   03/01/2024 91 70 - 110 mg/dL Final     BUN   Date Value Ref Range Status   03/01/2024 20 8 - 23 mg/dL Final     Creatinine   Date Value Ref Range Status   03/01/2024 1.3 0.5 - 1.4 mg/dL Final     Calcium   Date Value Ref Range Status   03/01/2024 8.8 8.7 - 10.5 mg/dL Final     Total Protein   Date Value Ref Range Status   11/29/2023 6.9 6.0 - 8.4 g/dL Final     Albumin   Date Value Ref Range Status   03/01/2024 3.5 3.5 - 5.2 g/dL Final     Total Bilirubin   Date Value Ref Range Status   11/29/2023 0.6 0.1 - 1.0 mg/dL Final     Comment:     For infants and newborns, interpretation of results should be based  on gestational age, weight and in agreement with clinical  observations.    Premature Infant recommended reference ranges:  Up to 24 hours.............<8.0 mg/dL  Up to 48 hours............<12.0 mg/dL  3-5 days..................<15.0 mg/dL  6-29 days.................<15.0 mg/dL       Alkaline Phosphatase   Date Value Ref Range Status   11/29/2023 75 55 - 135 U/L Final     AST   Date Value Ref Range Status   11/29/2023  32 10 - 40 U/L Final     ALT   Date Value Ref Range Status   11/29/2023 33 10 - 44 U/L Final     Anion Gap   Date Value Ref Range Status   03/01/2024 10 8 - 16 mmol/L Final     eGFR if    Date Value Ref Range Status   01/14/2022 >60.0 >60 mL/min/1.73 m^2 Final     eGFR if non    Date Value Ref Range Status   01/14/2022 53.4 (A) >60 mL/min/1.73 m^2 Final     Comment:     Calculation used to obtain the estimated glomerular filtration  rate (eGFR) is the CKD-EPI equation.        Lab Results   Component Value Date    WBC 5.23 11/29/2023    HGB 12.6 (L) 11/29/2023    HCT 40.3 11/29/2023    MCV 96 11/29/2023     11/29/2023     Lab Results   Component Value Date    CHOL 201 (H) 04/17/2024     Lab Results   Component Value Date    HDL 70 04/17/2024     Lab Results   Component Value Date    LDLCALC 124.2 04/17/2024     Lab Results   Component Value Date    TRIG 34 04/17/2024     Lab Results   Component Value Date    CHOLHDL 34.8 04/17/2024     Lab Results   Component Value Date    TSH 2.879 04/17/2024     Lab Results   Component Value Date    HGBA1C 5.7 12/20/2005     Assessment:       1. Benign prostatic hyperplasia without lower urinary tract symptoms    2. Dyslipidemia    3. History of prostate cancer    4. Primary hypertension    5. Acquired hypothyroidism    6. Unilateral inguinal hernia without obstruction or gangrene, recurrence not specified        Plan:   Benign prostatic hyperplasia without lower urinary tract symptoms    Dyslipidemia    History of prostate cancer------------sees urology------------    Primary hypertension  -     Basic Metabolic Panel; Future; Expected date: 10/17/2024  -     CBC Auto Differential; Future; Expected date: 10/17/2024    Acquired hypothyroidism    Unilateral inguinal hernia without obstruction or gangrene, recurrence not specified  -     Ambulatory referral/consult to General Surgery; Future; Expected date:  10/24/2024      Stable-----------continue meds------------as above------------------sees cards--------------------f/u 6 months--------------

## 2024-10-18 ENCOUNTER — PATIENT MESSAGE (OUTPATIENT)
Dept: FAMILY MEDICINE | Facility: CLINIC | Age: 79
End: 2024-10-18
Payer: MEDICARE

## 2024-10-29 NOTE — PROGRESS NOTES
Assessment   29 y.o.  presenting for annual exam.     Plan   Diagnoses and all orders for this visit:    Well woman exam with routine gynecological exam  -     Liquid-based pap, screening    Screening for cervical cancer  -     Liquid-based pap, screening        Pap collected     Perineal hygiene reviewed. Weight bearing exercises minium of 150 mins/weekly advised. Kegel exercises recommended.   SBE encouraged, A yearly mammogram is recommended for breast cancer screening starting at age 40. ASCCP guidelines reviewed. Condoms encouraged with all sexual activity to prevent STI's. Gardisil vaccines recommended up to age 45. Calcium/ Vit D dietary requirements discussed.   Advised to call with any issues, all concerns & questions addressed.   See provided information in your after visit summary     F/U Annually and PRN    Results will be released to [employer], if abnormal will call or message to review and discuss treatment plan.     __________________________________________________________________    Subjective     Kezia Reardon is a 29 y.o.  presenting for annual exam. She is without complaint    SCREENING  Last Pap: 10/2021 negative      GYN  Periods are only present occasionally with Mirena IUD in place    Sexually active:  never  Contraception: Mirena IUD (replaced 2023)     Hx Abnormal pap: denies  We reviewed ASCCP guidelines for Pap testing today.  Gardasil: She did complete the Gardasil series.    Denies vaginal discharge, itching, odor, dyspareunia, pelvic pain and vulvar/vaginal symptoms      OB           Complaints: denies   Denies urgency, frequency, hematuria, leakage / change in stream, difficulty urinating.       BREAST  Complaints: denies   Denies: breast lump, breast tenderness, nipple discharge, skin color change, and skin lesion(s)  Personal hx: none      Pertinent Family Hx:   Family hx of breast cancer: no  Family hx of ovarian cancer: no  Family hx of colon cancer:  David Amaro is a 76 y.o. male for whom nephrology consult has been requested to evaluate and give opinion.   Renal clinic f/u note:  Date of clinic visit: 1/20/22  Reason for f/u and chief c/o: CKD    HPI: Pt was seen and examined. Labs and meds reviewed. Chart was reviewed. Pt is new to me today, was being seen by Dr. Payton in the past. Pt is a 75 y/o male with CKD stage 2 who presents for f/u. s Cr at baseline is close to 1.3. Pt has a h/o of HTN and prior prostate cancer. Pt feels well today, no acute or new c/o's, no discomfort, no symptoms reported. Medical h/o, labs and meds reviewed with pt.    PAST MEDICAL HISTORY:  CKD stage 2, HTN, Arthritis, Dyslipidemia, Hyperlipidemia, Hypothyroidism, Prostate cancer, Radiation induced proctitis, and Thyroid disease.    PAST SURGICAL HISTORY:  He  has a past surgical history that includes Hernia repair (2009 approx); seed implants in prostate (02/2009); Tonsillectomy; and Colonoscopy (N/A, 4/3/2017).    SOCIAL HISTORY:  He  reports that he has never smoked. He has never used smokeless tobacco. He reports that he does not drink alcohol and does not use drugs.    FAMILY MEDICAL HISTORY:  His family history includes Bone cancer in his brother; Cancer in his brother, mother, and sister; Colon cancer in his mother; Diabetes in his father; Heart disease in his maternal grandmother; Stroke in his mother.    Review of patient's allergies indicates:   Allergen Reactions    No known drug allergies            Prior to Admission medications    Medication Sig Start Date End Date Taking? Authorizing Provider   doxazosin (CARDURA) 4 MG tablet TAKE 1 TABLET(4 MG) BY MOUTH EVERY EVENING 6/20/21  Yes Stepan Pierre MD   ergocalciferol (ERGOCALCIFEROL) 50,000 unit Cap Take 1 capsule (50,000 Units total) by mouth every 7 days. 6/7/21  Yes Arabella Ruff MD   levothyroxine (SYNTHROID) 25 MCG tablet TAKE 1 TABLET(25 MCG) BY MOUTH EVERY DAY 9/18/21  Yes Stepan VINSON  "MD Ignacio   PNEUMOVAX-23 25 mcg/0.5 mL injection  4/14/21  Yes Historical Provider   doxazosin (CARDURA) 4 MG tablet Take 1 tablet (4 mg total) by mouth every evening. 6/25/21   Stepan Pierre MD   gabapentin (NEURONTIN) 100 MG capsule Take 1 capsule (100 mg total) by mouth every evening. 9/2/21 9/2/22  Stepan Pierre MD        REVIEW OF SYSTEMS:  Patient has no fever, fatigue, visual changes, chest pain, edema, cough, dyspnea, nausea, vomiting, constipation, diarrhea, arthralgias, pruritis, dizziness, weakness, depression, confusion.    PHYSICAL EXAM:   height is 5' 9" (1.753 m) and weight is 76.7 kg (169 lb 1.5 oz). His blood pressure is 172/92 (abnormal) and his pulse is 94. His respiration is 20.   Repeat BP after 5 min of rest was 148/80  Gen: WDWN male in no apparent distress  Psych: Normal mood and affect  Skin: No rashes or ulcers  Neck: No JVD  Chest: Clear with no rales, rhonchi, wheezing with normal effort  CV: Regular with no murmurs, gallops or rubs  Abd: Soft, nontender, no distension  Ext: No  edema    Labs reviewed  BMP  Lab Results   Component Value Date     01/14/2022    K 3.8 01/14/2022     01/14/2022    CO2 26 01/14/2022    BUN 18 01/14/2022    CREATININE 1.3 01/14/2022    CALCIUM 8.9 01/14/2022    ANIONGAP 7 (L) 01/14/2022    ESTGFRAFRICA >60.0 01/14/2022    EGFRNONAA 53.4 (A) 01/14/2022     Lab Results   Component Value Date    WBC 5.37 01/14/2022    HGB 12.4 (L) 01/14/2022    HCT 38.7 (L) 01/14/2022    MCV 96 01/14/2022     01/14/2022     U/a: no protein, no blood  Urine prot/cr unremarkable    Renal u/s 2018 reviewed  Right kidney: The right kidney measures 10.2 cm. No cortical thinning. No loss of corticomedullary distinction. There is a 10 mm simple cyst at the upper pole on the right.  No solid renal masses visualized.  No renal stone. No hydronephrosis.  Left kidney: The left kidney measures 10.1 cm. No cortical thinning. No loss of " PGF      GENERAL  PMH reviewed/updated and is as below.   Patient does follow with a PCP.    SOCIAL  Smoking: no  Alcohol:no  Drug: no  Occupation: medical device engineer      Past Medical History:   Diagnosis Date    Allergic 2014    Seasonal    Depression 7/2024    Eating disorder [id number]    past, not current    Headache(784.0) 2011    hemiplegic migraines    Migraine     Migraine with aura     Visual impairment 2003    glasses       Past Surgical History:   Procedure Laterality Date    CYST REMOVAL      NO PAST SURGERIES      WISDOM TOOTH EXTRACTION           Current Outpatient Medications:     FLUoxetine 10 MG tablet, Take 1 tablet (10 mg total) by mouth daily, Disp: 90 tablet, Rfl: 1    Lasmiditan Succinate (REYVOW) 50 MG tablet, Take 1 tablet (50 mg) one time as needed for migraine. Do not use more than one dose per day, or more than four doses per month, Disp: 8 tablet, Rfl: 3    levonorgestrel (MIRENA) 20 MCG/24HR IUD, 1 each by Intrauterine route once, Disp: , Rfl:     Allergies   Allergen Reactions    Latex Hives       Social History     Socioeconomic History    Marital status: Single     Spouse name: Not on file    Number of children: Not on file    Years of education: college student    Highest education level: Not on file   Occupational History    Not on file   Tobacco Use    Smoking status: Never     Passive exposure: Never    Smokeless tobacco: Never   Vaping Use    Vaping status: Never Used   Substance and Sexual Activity    Alcohol use: Not Currently     Comment: Cannot drink due to interaction with verapamil    Drug use: Never    Sexual activity: Never     Birth control/protection: I.U.D.     Comment: Mirena 1/11/23   Other Topics Concern    Not on file   Social History Narrative    Caffeine use    Lives with family     Social Determinants of Health     Financial Resource Strain: Not on file   Food Insecurity: Not on file   Transportation Needs: Not on file   Physical Activity: Not on file    Stress: Not on file   Social Connections: Not on file   Intimate Partner Violence: Not on file   Housing Stability: Not on file       Review of Systems     ROS:  Constitutional: Negative for fatigue and unexpected weight change.   Respiratory: Negative for cough and shortness of breath.    Cardiovascular: Negative for chest pain and palpitations.   Gastrointestinal: Negative for abdominal pain and change in bowel habits  Breasts:  Negative, other than as noted above.   Genitourinary: Negative, other than as noted above.   Psychiatric: Negative for mood difficulties.      Objective      /72 (BP Location: Left arm, Patient Position: Sitting, Cuff Size: Standard)   Wt 61.2 kg (135 lb)   LMP 09/06/2024 (Exact Date)   BMI 21.79 kg/m²     Physical Examination:    Patient appears well and is not in distress  Neck is supple without masses, no cervical or supraclavicular lymphadenopathy  Cardiovascular: regular rate and rhythm; no murmurs  Lungs: clear to auscultation bilaterally; no wheezes  Breasts are symmetrical without mass, tenderness, nipple discharge, skin changes or adenopathy.   Abdomen is soft and nontender without masses.   External genitals are normal without lesions or rashes.  Urethral meatus and urethra are normal  Bladder is normal to palpation  Vagina is normal without discharge or bleeding.   Cervix is normal without discharge or lesion. + IUD strings   Uterus is normal, mobile, nontender without palpable mass.  Adnexa are normal, nontender, without palpable mass.                  corticomedullary distinction. Small simple peripelvic cyst noted at the lower pole appears unchanged in size measuring approximately 12 mm.  No solid renal lesions visualized.  No renal stone. No hydronephrosis        IMPRESSION AND RECOMMENDATIONS:  75 y/o male with mild CKD presents for f/u:    1. Renal: s Cr stable, at baseline.  Stable renal function. CKD stage 2, bordering in stage 3.  H/o of HTN  Pt has age related decline in kidney function  In addition, has a h/o of HTN, likely hypertensive nephrosclerosis  No proteinuria  No hematuria  K normal  Na normal  Ca normal  Acid base stable    Renal u/s report reviewed  Has benign, simple cysts, o/w unremarkable    2. HTN: BP not well controlled.  Previously better controlled, prior VS's reviewed  Discussed with pt  Meds reviewed  Recommended addition of low dose amlodipine, pt wants to discuss with Dr. Pierre first  Will defer to PCP    3. Life style, diet, physical activity reviewed    Plans and recommendations:   As discussed above  Total time spent 40 minutes including time needed to review the records, the   patient evaluation, documentation, face-to-face discussion with the patient,   more than 50% of the time was spent on coordination of care and counseling.    Level V visit.  RTC 1 year    Arabella Ruff MD

## 2024-10-30 ENCOUNTER — OFFICE VISIT (OUTPATIENT)
Dept: SURGERY | Facility: CLINIC | Age: 79
End: 2024-10-30
Payer: MEDICARE

## 2024-10-30 VITALS
HEIGHT: 69 IN | SYSTOLIC BLOOD PRESSURE: 164 MMHG | DIASTOLIC BLOOD PRESSURE: 80 MMHG | BODY MASS INDEX: 25.01 KG/M2 | WEIGHT: 168.88 LBS | HEART RATE: 63 BPM | TEMPERATURE: 97 F

## 2024-10-30 DIAGNOSIS — K40.90 UNILATERAL INGUINAL HERNIA WITHOUT OBSTRUCTION OR GANGRENE, RECURRENCE NOT SPECIFIED: ICD-10-CM

## 2024-10-30 DIAGNOSIS — K40.90 RIGHT INGUINAL HERNIA: Primary | ICD-10-CM

## 2024-10-30 PROCEDURE — 1126F AMNT PAIN NOTED NONE PRSNT: CPT | Mod: CPTII,S$GLB,, | Performed by: SURGERY

## 2024-10-30 PROCEDURE — 99204 OFFICE O/P NEW MOD 45 MIN: CPT | Mod: S$GLB,,, | Performed by: SURGERY

## 2024-10-30 PROCEDURE — 1160F RVW MEDS BY RX/DR IN RCRD: CPT | Mod: CPTII,S$GLB,, | Performed by: SURGERY

## 2024-10-30 PROCEDURE — 99999 PR PBB SHADOW E&M-EST. PATIENT-LVL IV: CPT | Mod: PBBFAC,,, | Performed by: SURGERY

## 2024-10-30 PROCEDURE — 3079F DIAST BP 80-89 MM HG: CPT | Mod: CPTII,S$GLB,, | Performed by: SURGERY

## 2024-10-30 PROCEDURE — 3077F SYST BP >= 140 MM HG: CPT | Mod: CPTII,S$GLB,, | Performed by: SURGERY

## 2024-10-30 PROCEDURE — 1159F MED LIST DOCD IN RCRD: CPT | Mod: CPTII,S$GLB,, | Performed by: SURGERY

## 2024-10-30 RX ORDER — CEFAZOLIN SODIUM 2 G/50ML
2 SOLUTION INTRAVENOUS
OUTPATIENT
Start: 2024-10-30

## 2024-10-30 RX ORDER — SODIUM CHLORIDE 9 MG/ML
INJECTION, SOLUTION INTRAVENOUS CONTINUOUS
OUTPATIENT
Start: 2024-10-30

## 2024-11-21 RX ORDER — DOXAZOSIN 2 MG/1
TABLET ORAL
Qty: 180 TABLET | Refills: 3 | Status: SHIPPED | OUTPATIENT
Start: 2024-11-21

## 2024-11-22 NOTE — TELEPHONE ENCOUNTER
Care Due:                  Date            Visit Type   Department     Provider  --------------------------------------------------------------------------------                                EP -                              PRIMARY      [facility]  Last Visit: 10-      CARE ([facility])   MEDICINE       Stepan Pierre                              EP -                              PRIMARY      [facility]  Next Visit: 04-      CARE ([facility])   EUSEBIO Pierre                                                            Last  Test          Frequency    Reason                     Performed    Due Date  --------------------------------------------------------------------------------    Vitamin D...  12 months..  ergocalciferol...........  Not Found    Overdue    [employer] Embedded Care Due Messages. Reference number: [id number].   11/21/2024 8:12:15 PM CST

## 2024-11-22 NOTE — TELEPHONE ENCOUNTER
Provider Staff:  Action required for this patient    Requires labs      Please see care gap opportunities below in Care Due Message.    Thanks!  [facility]     Appointments      Date Provider   Last Visit   10/17/2024 Stepan Pierre MD   Next Visit   4/17/2025 Stepan Pierre MD     Refill Decision Note   David Amaro  is requesting a refill authorization.    Brief Assessment and Rationale for Refill:   Approve       Medication Therapy Plan:         Comments:     Note composed:11:42 PM 11/21/2024

## 2024-12-30 ENCOUNTER — HOSPITAL ENCOUNTER (OUTPATIENT)
Dept: CARDIOLOGY | Facility: HOSPITAL | Age: 79
Discharge: HOME | End: 2024-12-30
Attending: SURGERY
Payer: MEDICARE

## 2024-12-30 DIAGNOSIS — K40.90 RIGHT INGUINAL HERNIA: ICD-10-CM

## 2024-12-30 LAB
OHS QRS DURATION: 134 MS
OHS QTC CALCULATION: 459 MS

## 2024-12-30 PROCEDURE — 93010 ELECTROCARDIOGRAM REPORT: CPT | Mod: ,,, | Performed by: INTERNAL MEDICINE

## 2024-12-30 PROCEDURE — 93005 ELECTROCARDIOGRAM TRACING: CPT

## 2025-01-02 ENCOUNTER — PATIENT MESSAGE (OUTPATIENT)
Dept: PREADMISSION TESTING | Facility: HOSPITAL | Age: 80
End: 2025-01-02
Payer: MEDICARE

## 2025-01-02 ENCOUNTER — ANESTHESIA EVENT (OUTPATIENT)
Dept: SURGERY | Facility: HOSPITAL | Age: 80
End: 2025-01-02
Payer: MEDICARE

## 2025-01-02 RX ORDER — SODIUM CHLORIDE, SODIUM LACTATE, POTASSIUM CHLORIDE, CALCIUM CHLORIDE 600; 310; 30; 20 MG/100ML; MG/100ML; MG/100ML; MG/100ML
INJECTION, SOLUTION INTRAVENOUS CONTINUOUS
OUTPATIENT
Start: 2025-01-02

## 2025-01-02 NOTE — ANESTHESIA PREPROCEDURE EVALUATION
01/02/2025  David Amaro is a 79 y.o., male.  Past Medical History:   Diagnosis Date    Arthritis     Bilateral low back pain 9/2/2021    Cancer     prostate    CKD (chronic kidney disease)     Dyslipidemia     HTN (hypertension)     Hyperlipidemia     Hypothyroidism     Prostate cancer     Radiation induced proctitis     Thyroid disease      Past Surgical History:   Procedure Laterality Date    COLONOSCOPY N/A 4/3/2017    Procedure: COLONOSCOPY;  Surgeon: Olivier Grayson MD;  Location: [facility] ENDO;  Service: Endoscopy;  Laterality: N/A;    COLONOSCOPY N/A 8/3/2022    Procedure: COLONOSCOPY;  Surgeon: Ramila Burgess DO;  Location: [facility] ENDO;  Service: General;  Laterality: N/A;    HERNIA REPAIR  2009 approx    seed implants in prostate  02/2009    TONSILLECTOMY           Pre-op Assessment    I have reviewed the Patient Summary Reports.     I have reviewed the Nursing Notes. I have reviewed the NPO Status.   I have reviewed the Medications.     Review of Systems  Anesthesia Hx:  No problems with previous Anesthesia   History of prior surgery of interest to airway management or planning:  Previous anesthesia: General        Denies Family Hx of Anesthesia complications.    Denies Personal Hx of Anesthesia complications.                    Social:  Non-Smoker       Hematology/Oncology:  Hematology Normal                       --  Cancer in past history:                 Oncology Comments: Prostate CA.     Cardiovascular:     Hypertension           hyperlipidemia                               Pulmonary:  Pulmonary Normal                       Renal/:  Chronic Renal Disease, CKD  BPH              Hepatic/GI:  Hepatic/GI Normal                    Musculoskeletal:  Arthritis               Neurological:  Neurology Normal                                      Endocrine:   Hypothyroidism           Psych:  Psychiatric Normal                    Physical Exam  General: Well nourished    Airway:  Mallampati: III   Mouth Opening: Small, but > 3cm  TM Distance: Normal  Tongue: Normal  Neck ROM: Extension Decreased    Dental:  Intact        Anesthesia Plan  Type of Anesthesia, risks & benefits discussed:    Anesthesia Type: Gen ETT  Intra-op Monitoring Plan: Standard ASA Monitors  Post Op Pain Control Plan: multimodal analgesia and IV/PO Opioids PRN  Induction:  IV  Informed Consent: Informed consent signed with the Patient and all parties understand the risks and agree with anesthesia plan.  All questions answered. Patient consented to blood products? No  ASA Score: 3  Day of Surgery Review of History & Physical: H&P Update referred to the surgeon/provider.    Ready For Surgery From Anesthesia Perspective.     .

## 2025-01-03 RX ORDER — ERGOCALCIFEROL 1.25 MG/1
50000 CAPSULE ORAL
Qty: 12 CAPSULE | Refills: 3 | Status: SHIPPED | OUTPATIENT
Start: 2025-01-03

## 2025-01-03 NOTE — TELEPHONE ENCOUNTER
No care due was identified.  Health [facility] Embedded Care Due Messages. Reference number: [id number].   1/03/2025 12:26:20 PM CST

## 2025-01-06 ENCOUNTER — PATIENT MESSAGE (OUTPATIENT)
Dept: RESPIRATORY THERAPY | Facility: HOSPITAL | Age: 80
End: 2025-01-06
Payer: MEDICARE

## 2025-01-07 ENCOUNTER — HOSPITAL ENCOUNTER (OUTPATIENT)
Facility: HOSPITAL | Age: 80
Discharge: HOME | End: 2025-01-07
Attending: SURGERY | Admitting: SURGERY
Payer: MEDICARE

## 2025-01-07 ENCOUNTER — ANESTHESIA (OUTPATIENT)
Dept: SURGERY | Facility: HOSPITAL | Age: 80
End: 2025-01-07
Payer: MEDICARE

## 2025-01-07 VITALS
BODY MASS INDEX: 24.63 KG/M2 | RESPIRATION RATE: 16 BRPM | OXYGEN SATURATION: 95 % | WEIGHT: 166.31 LBS | HEART RATE: 63 BPM | DIASTOLIC BLOOD PRESSURE: 74 MMHG | HEIGHT: 69 IN | SYSTOLIC BLOOD PRESSURE: 156 MMHG | TEMPERATURE: 98 F

## 2025-01-07 DIAGNOSIS — K40.90 RIGHT INGUINAL HERNIA: Primary | ICD-10-CM

## 2025-01-07 PROCEDURE — 63600175 PHARM REV CODE 636 W HCPCS: Performed by: NURSE ANESTHETIST, CERTIFIED REGISTERED

## 2025-01-07 PROCEDURE — 63600175 PHARM REV CODE 636 W HCPCS: Performed by: SURGERY

## 2025-01-07 PROCEDURE — 37000009 HC ANESTHESIA EA ADD 15 MINS: Performed by: SURGERY

## 2025-01-07 PROCEDURE — 71000016 HC POSTOP RECOV ADDL HR: Performed by: SURGERY

## 2025-01-07 PROCEDURE — 25000003 PHARM REV CODE 250: Performed by: NURSE ANESTHETIST, CERTIFIED REGISTERED

## 2025-01-07 PROCEDURE — 49505 PRP I/HERN INIT REDUC >5 YR: CPT | Mod: RT,,, | Performed by: SURGERY

## 2025-01-07 PROCEDURE — 71000033 HC RECOVERY, INTIAL HOUR: Performed by: SURGERY

## 2025-01-07 PROCEDURE — 36000707: Performed by: SURGERY

## 2025-01-07 PROCEDURE — C1781 MESH (IMPLANTABLE): HCPCS | Performed by: SURGERY

## 2025-01-07 PROCEDURE — 36000706: Performed by: SURGERY

## 2025-01-07 PROCEDURE — 71000015 HC POSTOP RECOV 1ST HR: Performed by: SURGERY

## 2025-01-07 PROCEDURE — 25000003 PHARM REV CODE 250: Performed by: ANESTHESIOLOGY

## 2025-01-07 PROCEDURE — C1729 CATH, DRAINAGE: HCPCS | Performed by: SURGERY

## 2025-01-07 PROCEDURE — 63600175 PHARM REV CODE 636 W HCPCS: Performed by: ANESTHESIOLOGY

## 2025-01-07 PROCEDURE — 37000008 HC ANESTHESIA 1ST 15 MINUTES: Performed by: SURGERY

## 2025-01-07 DEVICE — PLUG MEDIUM: Type: IMPLANTABLE DEVICE | Site: GROIN | Status: FUNCTIONAL

## 2025-01-07 RX ORDER — IBUPROFEN 800 MG/1
800 TABLET ORAL 3 TIMES DAILY PRN
Qty: 30 TABLET | Refills: 0 | Status: SHIPPED | OUTPATIENT
Start: 2025-01-07

## 2025-01-07 RX ORDER — LIDOCAINE HYDROCHLORIDE 10 MG/ML
INJECTION, SOLUTION EPIDURAL; INFILTRATION; INTRACAUDAL; PERINEURAL
Status: DISCONTINUED
Start: 2025-01-07 | End: 2025-01-07 | Stop reason: HOSPADM

## 2025-01-07 RX ORDER — ONDANSETRON HYDROCHLORIDE 2 MG/ML
4 INJECTION, SOLUTION INTRAVENOUS DAILY PRN
Status: DISCONTINUED | OUTPATIENT
Start: 2025-01-07 | End: 2025-01-07 | Stop reason: HOSPADM

## 2025-01-07 RX ORDER — ACETAMINOPHEN 10 MG/ML
INJECTION, SOLUTION INTRAVENOUS
Status: DISCONTINUED | OUTPATIENT
Start: 2025-01-07 | End: 2025-01-07

## 2025-01-07 RX ORDER — MEPERIDINE HYDROCHLORIDE 25 MG/ML
12.5 INJECTION INTRAMUSCULAR; INTRAVENOUS; SUBCUTANEOUS ONCE AS NEEDED
Status: DISCONTINUED | OUTPATIENT
Start: 2025-01-07 | End: 2025-01-07 | Stop reason: HOSPADM

## 2025-01-07 RX ORDER — LIDOCAINE HYDROCHLORIDE 10 MG/ML
INJECTION, SOLUTION EPIDURAL; INFILTRATION; INTRACAUDAL; PERINEURAL
Status: DISCONTINUED | OUTPATIENT
Start: 2025-01-07 | End: 2025-01-07 | Stop reason: HOSPADM

## 2025-01-07 RX ORDER — PHENYLEPHRINE HYDROCHLORIDE 10 MG/ML
INJECTION INTRAVENOUS
Status: DISCONTINUED | OUTPATIENT
Start: 2025-01-07 | End: 2025-01-07

## 2025-01-07 RX ORDER — HYDROCODONE BITARTRATE AND ACETAMINOPHEN 5; 325 MG/1; MG/1
1 TABLET ORAL EVERY 4 HOURS PRN
Status: DISCONTINUED | OUTPATIENT
Start: 2025-01-07 | End: 2025-01-07 | Stop reason: HOSPADM

## 2025-01-07 RX ORDER — FENTANYL CITRATE 50 UG/ML
INJECTION, SOLUTION INTRAMUSCULAR; INTRAVENOUS
Status: DISCONTINUED | OUTPATIENT
Start: 2025-01-07 | End: 2025-01-07

## 2025-01-07 RX ORDER — HYDROCODONE BITARTRATE AND ACETAMINOPHEN 10; 325 MG/1; MG/1
1 TABLET ORAL EVERY 4 HOURS PRN
Qty: 15 TABLET | Refills: 0 | Status: SHIPPED | OUTPATIENT
Start: 2025-01-07

## 2025-01-07 RX ORDER — HYDROCODONE BITARTRATE AND ACETAMINOPHEN 10; 325 MG/1; MG/1
1 TABLET ORAL EVERY 4 HOURS PRN
Status: DISCONTINUED | OUTPATIENT
Start: 2025-01-07 | End: 2025-01-07 | Stop reason: HOSPADM

## 2025-01-07 RX ORDER — BUPIVACAINE HYDROCHLORIDE 2.5 MG/ML
INJECTION, SOLUTION EPIDURAL; INFILTRATION; INTRACAUDAL
Status: DISCONTINUED | OUTPATIENT
Start: 2025-01-07 | End: 2025-01-07 | Stop reason: HOSPADM

## 2025-01-07 RX ORDER — ONDANSETRON HYDROCHLORIDE 2 MG/ML
INJECTION, SOLUTION INTRAVENOUS
Status: DISCONTINUED | OUTPATIENT
Start: 2025-01-07 | End: 2025-01-07

## 2025-01-07 RX ORDER — EPHEDRINE SULFATE 50 MG/ML
INJECTION, SOLUTION INTRAVENOUS
Status: DISCONTINUED | OUTPATIENT
Start: 2025-01-07 | End: 2025-01-07

## 2025-01-07 RX ORDER — CEFAZOLIN SODIUM 1 G/3ML
INJECTION, POWDER, FOR SOLUTION INTRAMUSCULAR; INTRAVENOUS
Status: DISCONTINUED | OUTPATIENT
Start: 2025-01-07 | End: 2025-01-07

## 2025-01-07 RX ORDER — ONDANSETRON HYDROCHLORIDE 2 MG/ML
4 INJECTION, SOLUTION INTRAVENOUS EVERY 12 HOURS PRN
Status: DISCONTINUED | OUTPATIENT
Start: 2025-01-07 | End: 2025-01-07 | Stop reason: HOSPADM

## 2025-01-07 RX ORDER — OXYCODONE AND ACETAMINOPHEN 5; 325 MG/1; MG/1
1 TABLET ORAL
Status: DISCONTINUED | OUTPATIENT
Start: 2025-01-07 | End: 2025-01-07 | Stop reason: HOSPADM

## 2025-01-07 RX ORDER — ROCURONIUM BROMIDE 10 MG/ML
INJECTION, SOLUTION INTRAVENOUS
Status: DISCONTINUED | OUTPATIENT
Start: 2025-01-07 | End: 2025-01-07

## 2025-01-07 RX ORDER — BUPIVACAINE HYDROCHLORIDE 2.5 MG/ML
INJECTION, SOLUTION EPIDURAL; INFILTRATION; INTRACAUDAL
Status: DISCONTINUED
Start: 2025-01-07 | End: 2025-01-07 | Stop reason: HOSPADM

## 2025-01-07 RX ORDER — FENTANYL CITRATE 50 UG/ML
25 INJECTION, SOLUTION INTRAMUSCULAR; INTRAVENOUS EVERY 5 MIN PRN
Status: DISCONTINUED | OUTPATIENT
Start: 2025-01-07 | End: 2025-01-07 | Stop reason: HOSPADM

## 2025-01-07 RX ORDER — SODIUM CHLORIDE 9 MG/ML
INJECTION, SOLUTION INTRAVENOUS CONTINUOUS
Status: DISCONTINUED | OUTPATIENT
Start: 2025-01-07 | End: 2025-01-07 | Stop reason: HOSPADM

## 2025-01-07 RX ORDER — GLUCAGON 1 MG
1 KIT INJECTION
Status: DISCONTINUED | OUTPATIENT
Start: 2025-01-07 | End: 2025-01-07 | Stop reason: HOSPADM

## 2025-01-07 RX ORDER — PROPOFOL 10 MG/ML
VIAL (ML) INTRAVENOUS
Status: DISCONTINUED | OUTPATIENT
Start: 2025-01-07 | End: 2025-01-07

## 2025-01-07 RX ORDER — SUCCINYLCHOLINE CHLORIDE 20 MG/ML
INJECTION INTRAMUSCULAR; INTRAVENOUS
Status: DISCONTINUED | OUTPATIENT
Start: 2025-01-07 | End: 2025-01-07

## 2025-01-07 RX ORDER — DEXAMETHASONE SODIUM PHOSPHATE 4 MG/ML
INJECTION, SOLUTION INTRA-ARTICULAR; INTRALESIONAL; INTRAMUSCULAR; INTRAVENOUS; SOFT TISSUE
Status: DISCONTINUED | OUTPATIENT
Start: 2025-01-07 | End: 2025-01-07

## 2025-01-07 RX ORDER — LIDOCAINE HYDROCHLORIDE 20 MG/ML
INJECTION, SOLUTION EPIDURAL; INFILTRATION; INTRACAUDAL; PERINEURAL
Status: DISCONTINUED | OUTPATIENT
Start: 2025-01-07 | End: 2025-01-07

## 2025-01-07 RX ADMIN — EPHEDRINE SULFATE 5 MG: 50 INJECTION INTRAVENOUS at 08:01

## 2025-01-07 RX ADMIN — ONDANSETRON 4 MG: 2 INJECTION INTRAMUSCULAR; INTRAVENOUS at 08:01

## 2025-01-07 RX ADMIN — FENTANYL CITRATE 25 MCG: 50 INJECTION, SOLUTION INTRAMUSCULAR; INTRAVENOUS at 08:01

## 2025-01-07 RX ADMIN — FENTANYL CITRATE 25 MCG: 50 INJECTION, SOLUTION INTRAMUSCULAR; INTRAVENOUS at 09:01

## 2025-01-07 RX ADMIN — ROCURONIUM BROMIDE 30 MG: 10 SOLUTION INTRAVENOUS at 07:01

## 2025-01-07 RX ADMIN — EPHEDRINE SULFATE 10 MG: 50 INJECTION INTRAVENOUS at 08:01

## 2025-01-07 RX ADMIN — SODIUM CHLORIDE: 9 INJECTION, SOLUTION INTRAVENOUS at 06:01

## 2025-01-07 RX ADMIN — SODIUM CHLORIDE: 9 INJECTION, SOLUTION INTRAVENOUS at 08:01

## 2025-01-07 RX ADMIN — SUGAMMADEX 200 MG: 100 INJECTION, SOLUTION INTRAVENOUS at 08:01

## 2025-01-07 RX ADMIN — OXYCODONE HYDROCHLORIDE AND ACETAMINOPHEN 1 TABLET: 5; 325 TABLET ORAL at 09:01

## 2025-01-07 RX ADMIN — FENTANYL CITRATE 50 MCG: 50 INJECTION, SOLUTION INTRAMUSCULAR; INTRAVENOUS at 07:01

## 2025-01-07 RX ADMIN — EPHEDRINE SULFATE 10 MG: 50 INJECTION INTRAVENOUS at 07:01

## 2025-01-07 RX ADMIN — FENTANYL CITRATE 25 MCG: 50 INJECTION, SOLUTION INTRAMUSCULAR; INTRAVENOUS at 07:01

## 2025-01-07 RX ADMIN — ACETAMINOPHEN 1000 MG: 10 INJECTION, SOLUTION INTRAVENOUS at 07:01

## 2025-01-07 RX ADMIN — ROCURONIUM BROMIDE 10 MG: 10 SOLUTION INTRAVENOUS at 07:01

## 2025-01-07 RX ADMIN — CEFAZOLIN 2 G: 330 INJECTION, POWDER, FOR SOLUTION INTRAMUSCULAR; INTRAVENOUS at 07:01

## 2025-01-07 RX ADMIN — PROPOFOL 130 MG: 10 INJECTION, EMULSION INTRAVENOUS at 07:01

## 2025-01-07 RX ADMIN — SUCCINYLCHOLINE CHLORIDE 120 MG: 20 INJECTION, SOLUTION INTRAMUSCULAR; INTRAVENOUS; PARENTERAL at 07:01

## 2025-01-07 RX ADMIN — PROPOFOL 20 MG: 10 INJECTION, EMULSION INTRAVENOUS at 08:01

## 2025-01-07 RX ADMIN — PHENYLEPHRINE HYDROCHLORIDE 100 MCG: 10 INJECTION INTRAVENOUS at 07:01

## 2025-01-07 RX ADMIN — DEXAMETHASONE SODIUM PHOSPHATE 8 MG: 4 INJECTION, SOLUTION INTRA-ARTICULAR; INTRALESIONAL; INTRAMUSCULAR; INTRAVENOUS; SOFT TISSUE at 07:01

## 2025-01-07 RX ADMIN — LIDOCAINE HYDROCHLORIDE 70 MG: 20 INJECTION, SOLUTION EPIDURAL; INFILTRATION; INTRACAUDAL; PERINEURAL at 07:01

## 2025-01-07 NOTE — TRANSFER OF CARE
"Anesthesia Transfer of Care Note    Patient: David Amaro    Procedure(s) Performed: Procedure(s) (LRB):  REPAIR, HERNIA, INGUINAL (Right)    Patient location: PACU    Anesthesia Type: general    Transport from OR: Transported from OR on room air with adequate spontaneous ventilation    Post pain: adequate analgesia    Post assessment: no apparent anesthetic complications and tolerated procedure well    Post vital signs: stable    Level of consciousness: awake    Nausea/Vomiting: no nausea/vomiting    Complications: none    Transfer of care protocol was followed      Last vitals: Visit Vitals  BP (!) 161/77 (BP Location: Left arm, Patient Position: Lying)   Pulse 73   Temp 36.5 °C (97.7 °F) (Temporal)   Resp 17   Ht 5' 9" (1.753 m)   Wt 75.4 kg (166 lb 5.4 oz)   SpO2 97%   BMI 24.56 kg/m²     "

## 2025-01-07 NOTE — DISCHARGE INSTRUCTIONS
Hernia Discharge Instructions     The belly wall covers the center of your body. It keeps your stomach and other body organs in place.   Sometimes, your belly wall becomes weak, part of an organ may bulge or swell through the weak spot in the groin and get stuck.   A hernia repair surgery fixes this weakness in the abdomen/groin. Then, your organs stay in place. You may have this on one side of your body or on both sides.    What follow-up care is needed?   The incisions will be covered with Dermabond, a surgical glue, do not put any creams, lotions, or oils to the incision sites.   If you have a surgical dressing you can remove it in 48 hours.   You may shower in 48 hours, ok to wash incision site with soap and water. No tub baths/submerging in water until after your post op visit with Dr. Pedroza  No heavy lifting anything greater than 10 lbs for 6 weeks post op  Post-op visit to the office to check on your progress in 2-3 weeks. Be sure to keep this appointment  Use over the counter stool softeners while on narcotic pain medication to prevent post op constipation. We recommend you drink 8 to 10 glasses of water each day    Will physical activity be limited?   You may need to rest for a while. You should not do physical activity that makes your post-op recovery worse.   If you run, work out, or play sports, you may not be able to do those things until your surgical site heals.   Avoid activities that cause you to strain, like heavy lifting for several weeks.     When do I need to call the doctor?   Signs of infection. These include a fever of 100.4°F (38°C) or higher, chills  Drainage, warmth, or redness at the incision site  Abdomen/groin pain gets worse all of a sudden  Severe abdominal pain   Pain not managed with pain medications   Persistent nausea/vomiting   Inability able to pass stool  Trouble passing urine    If you have any questions or problems, please call my office or my nurse.    Dr. Santi WOLF  [place]  [phone]

## 2025-01-07 NOTE — PLAN OF CARE
Report to RN Gladys, care assumed. Patient moved to [facility] room 4, spouse at bedside with belongings. Call light within reach. Patient must void to discharge.

## 2025-01-07 NOTE — OP NOTE
[facility]  Surgery Department  Operative Note    SUMMARY     Date of Procedure: 1/7/2025     Procedure:   Open right inguinal hernia repair    Surgeons and Role:     * Santi Pedroza MD - Primary    Assisting Surgeon: None    Pre-Operative Diagnosis: Right inguinal hernia [K40.90]    Post-Operative Diagnosis: Post-Op Diagnosis Codes:     * Right inguinal hernia [K40.90]    Anesthesia: General    Operative Findings (including complications, if any): Open right inguinal hernia repair    Description of Technical Procedures:  Direct and indirect right inguinal hernia    Estimated Blood Loss (EBL): 10cc           Implants:   Implant Name Type Inv. Item Serial No.  Lot No. LRB No. Used Action   PLUG [employer] - [id number]  PLUG [employer]  [employer] [id number] Right 1 Implanted       Specimens:   Specimen (24h ago, onward)      None                    Condition: Good    Disposition: PACU - hemodynamically stable.    Procedure in Detail:  Patient was brought to the OR and underwent general anesthesia.  He was prepped and draped in usual sterile fashion in the supine position. We marked out an appropriate right inguinal incision and administered a mix of lidocaine and Marcaine. After assuring adequate local anesthesia, a 5 cm  oblique skin incision was made. Subcutaneous tissue was divided with Bovie electrocautery. This dissection was carried down through Jill fascia down to the aponeurosis of the external oblique. The aponeurosis of the external oblique was incised in line with its fibers, first with a scalpel and then Metzenbaum scissors, and this incision was extended to the external spermatic ring. The inguinal canal contents were bluntly encircled, first digitally and then with a Penrose drain. Weitlaner retractors were placed to facilitate the dissection. We proceeded to identify a direct and indirect inguinal hernia sac with cord lipoma. This was carefully dissected away from the inguinal  canal contents until it could be reduced into the defect. We performed skeletonization of the spermatic cord. We then had appropriate borders of the inguinal canal identified and decided to repair the defect with a patch. The mesh plug was placed to fit the defect appropriately and sewn in place with interrupted 0 vicryl suture. Medially, the mesh was anchored to the fascia overlying the pubic tubercle. Inferiorly, the mesh was anchored to the shelving edge of the inguinal ligament. Superiorly, the mesh was anchored to the conjoined tendon. The tails of the mesh were brought together around the cord structures creating a new internal ring that just admitted the tip of the finger. The mesh was inspected and noted to lie in appropriate position without any redundancy or tension.  The patch was then least to fit the defect down into place with 0 Nurolon sutures.Medially, the mesh was anchored to the fascia overlying the pubic tubercle. Inferiorly, the mesh was anchored to the shelving edge of the inguinal ligament. Superiorly, the mesh was anchored to the conjoined tendon. The tails of the mesh were brought together around the cord structures creating a new internal ring that just admitted the tip of the finger. The mesh was inspected and noted to lie in appropriate position without any redundancy or tension. The testicle was pulled back down to the scrotum and there was noted to be no tension on the cord structures. The aponeurosis of the external oblique was closed with a running 3-0 Vicryl suture. Jill fascia was closed with several buried interrupted 3-0 Vicryl sutures and the skin was closed with a running subcuticular Monocryl suture.  A sterile dressing was applied. The patient was then transported to the Recovery Room in stable condition. All sponge, instrument and needle counts were correct at the end of the case. I was present and scrubbed for the entire procedure.

## 2025-01-07 NOTE — PLAN OF CARE
Reviewed and completed all discharge orders. Printed AVS and educated patient and family member of its entirety, including physician's orders, follow-up appt, medications, when to call, and when to report to the emergency room. Reviewed prescriptions, pharmacy information, and made sure there were no conflicts preventing the patient from obtaining the newly prescribed medications. I encouraged questions, answered them thoroughly, and evaluated my instructions via teach-back method. Patient must void in order to discharge.

## 2025-01-07 NOTE — NURSING TRANSFER
Received patient from LANA Ruiz. Patient remains stable, AAOx3. Bed locked in lowest position, bilateral siderails up, bilateral nonslip socks on, call light within reach, instructed to call staff for assistance, family remains at bedside; patient due to void, urinal provided within patient reach.

## 2025-01-07 NOTE — H&P
History & Physical     SUBJECTIVE:      History of Present Illness:  Patient is a 79 y.o. male presents to discuss right inguinal hernia.  He reports feeling like it was gotten bigger over time.  Denies any discomfort.  Goes down when lying flat.     Initially referred for right inguinal hernia. Patient reports found incidentally on exam. Prior history of left inguinal hernia repair. Denies any discomfort, or bulge in the area. Going on trip soon and wanted to see if needed repair prior to vacation.     No chief complaint on file.             Review of patient's allergies indicates:   Allergen Reactions    No known drug allergies           Current Medications          Current Outpatient Medications   Medication Sig Dispense Refill    doxazosin (CARDURA) 2 MG tablet TAKE 2 TABLETS(4 MG) BY MOUTH EVERY EVENING 90 tablet 3    ergocalciferol (ERGOCALCIFEROL) 50,000 unit Cap Take 1 capsule (50,000 Units total) by mouth every 7 days. 12 capsule 3    levothyroxine (SYNTHROID) 25 MCG tablet TAKE 1 TABLET(25 MCG) BY MOUTH EVERY DAY 90 tablet 3      No current facility-administered medications for this visit.                 Past Medical History:   Diagnosis Date    Arthritis      Bilateral low back pain 9/2/2021    Cancer       prostate    CKD (chronic kidney disease)      Dyslipidemia      HTN (hypertension)      Hyperlipidemia      Hypothyroidism      Prostate cancer      Radiation induced proctitis      Thyroid disease              Past Surgical History:   Procedure Laterality Date    COLONOSCOPY N/A 4/3/2017     Procedure: COLONOSCOPY;  Surgeon: Olivier Grayson MD;  Location: [facility];  Service: Endoscopy;  Laterality: N/A;    COLONOSCOPY N/A 8/3/2022     Procedure: COLONOSCOPY;  Surgeon: Ramila Burgess DO;  Location: [facility];  Service: General;  Laterality: N/A;    HERNIA REPAIR   2009 approx    seed implants in prostate   02/2009    TONSILLECTOMY                 Family History   Problem Relation Name Age of  "Onset    Stroke Mother        Cancer Mother        Colon cancer Mother        Diabetes Father        Cancer Sister             lung    Cancer Brother        Bone cancer Brother        Heart disease Maternal Grandmother          Social History   Social History           Tobacco Use    Smoking status: Never    Smokeless tobacco: Never   Substance Use Topics    Alcohol use: No    Drug use: No            Review of Systems:  Review of Systems   Constitutional:  Negative for activity change, appetite change, chills, diaphoresis, fatigue, fever and unexpected weight change.   HENT:  Negative for congestion, hearing loss, rhinorrhea, sore throat and trouble swallowing.    Eyes:  Negative for discharge and visual disturbance.   Respiratory:  Negative for apnea, cough, choking, chest tightness, shortness of breath, wheezing and stridor.    Cardiovascular:  Negative for chest pain, palpitations and leg swelling.   Gastrointestinal:  Negative for abdominal distention, abdominal pain, anal bleeding, blood in stool, constipation, diarrhea, nausea, rectal pain and vomiting.   Endocrine: Negative for cold intolerance, heat intolerance, polydipsia, polyphagia and polyuria.   Genitourinary:  Negative for difficulty urinating, dysuria, frequency, hematuria and urgency.   Musculoskeletal:  Negative for arthralgias, back pain, joint swelling, myalgias and neck pain.   Skin:  Negative for color change, pallor, rash and wound.   Neurological:  Negative for dizziness, syncope, weakness, light-headedness, numbness and headaches.   Hematological:  Negative for adenopathy. Does not bruise/bleed easily.   Psychiatric/Behavioral:  Negative for agitation, confusion, decreased concentration, dysphoric mood and sleep disturbance. The patient is not nervous/anxious.          OBJECTIVE:      Vital Signs (Most Recent)  Temp: 97.3 °F (36.3 °C) (10/30/24 1057)  Pulse: 63 (10/30/24 1057)  BP: (!) 164/80 (10/30/24 1057)  5' 9" (1.753 m)  76.6 kg (168 lb " 14 oz)      Physical Exam:  Physical Exam  Vitals reviewed.   Constitutional:       Appearance: He is well-developed.   HENT:      Head: Normocephalic and atraumatic.   Cardiovascular:      Rate and Rhythm: Normal rate and regular rhythm.   Pulmonary:      Effort: Pulmonary effort is normal.      Breath sounds: Normal breath sounds.   Abdominal:      General: Bowel sounds are normal. There is no distension.      Palpations: Abdomen is soft.      Tenderness: There is no abdominal tenderness.      Hernia: A hernia (Moderate sized right inguinal hernia) is present.   Musculoskeletal:      Cervical back: Normal range of motion and neck supple.   Skin:     General: Skin is warm and dry.   Neurological:      Mental Status: He is alert and oriented to person, place, and time.               ASSESSMENT/PLAN:      72 y/o male with right inguinal hernia     PLAN:  Plan  Hernias increased in size since last visit, remains reducible without pain.   Open right inguinal hernia repair with mesh  Preop:  CBC, CMP, EKG  Discussed risks and benefits of inguinal hernia repair including but not limited to:  Pain, bleeding, and infection, injury to spermatic cord, testicular atrophy or loss, nerve pain which may be long lasting, recurrence, need for further surgery

## 2025-01-07 NOTE — ANESTHESIA POSTPROCEDURE EVALUATION
Anesthesia Post Evaluation    Patient: David Amaro    Procedure(s) Performed: Procedure(s) (LRB):  REPAIR, HERNIA, INGUINAL (Right)    Final Anesthesia Type: general      Patient location during evaluation: PACU  Patient participation: Yes- Able to Participate  Level of consciousness: awake  Post-procedure vital signs: reviewed and stable  Pain management: adequate  Airway patency: patent    PONV status at discharge: No PONV  Anesthetic complications: no      Cardiovascular status: stable  Respiratory status: unassisted  Hydration status: euvolemic  Follow-up not needed.              Vitals Value Taken Time   /73 01/07/25 0913   Temp 36.5 °C (97.7 °F) 01/07/25 0836   Pulse 65 01/07/25 0917   Resp 15 01/07/25 0917   SpO2 98 % 01/07/25 0917   Vitals shown include unfiled device data.      No case tracking events are documented in the log.      Pain/Joy Score: Pain Rating Prior to Med Admin: 5 (1/7/2025  9:07 AM)  Joy Score: 9 (1/7/2025  9:06 AM)

## 2025-01-07 NOTE — DISCHARGE SUMMARY
[facility]  Discharge Note  Short Stay    Procedure(s) (LRB):  REPAIR, HERNIA, INGUINAL (Right)      OUTCOME: Patient tolerated treatment/procedure well without complication and is now ready for discharge.    DISPOSITION: Home or Self Care    FINAL DIAGNOSIS:  Right inguinal hernia    FOLLOWUP: In clinic    DISCHARGE INSTRUCTIONS:    Discharge Procedure Orders   Diet general     Lifting restrictions   Order Comments: No heavy lifting greater than 10 lb or strenuous activity x6 weeks     Call MD for:  temperature >100.4     Call MD for:  persistent nausea and vomiting     Call MD for:  severe uncontrolled pain     Call MD for:  difficulty breathing, headache or visual disturbances     Call MD for:  redness, tenderness, or signs of infection (pain, swelling, redness, odor or green/yellow discharge around incision site)     Call MD for:  hives     Call MD for:  persistent dizziness or light-headedness     Call MD for:  extreme fatigue     Remove dressing in 48 hours     Activity as tolerated     Shower on day dressing removed (No bath)        TIME SPENT ON DISCHARGE: 30 minutes

## 2025-01-07 NOTE — ANESTHESIA PROCEDURE NOTES
Intubation    Date/Time: 1/7/2025 7:04 AM    Performed by: Mago Robert CRNA  Authorized by: Judson García MD    Intubation:     Induction:  Intravenous    Intubated:  Postinduction    Mask Ventilation:  Easy with oral airway    Attempts:  2    Attempted By:  CRNA    Method of Intubation:  Video laryngoscopy    Blade:  Irene 3    Laryngeal View Grade: Grade IIb - only the arytenoids and epiglottis seen      Attempted By (2nd Attempt):  CRNA    Method of Intubation (2nd Attempt):  Video laryngoscopy and bougie    Blade (2nd Attempt):  Irene 3    Laryngeal View Grade (2nd Attempt): Grade IIb - only the arytenoids and epiglottis seen      Difficult Airway Encountered?: No      Complications:  None    Airway Device:  Oral endotracheal tube    Airway Device Size:  7.0    Style/Cuff Inflation:  Cuffed (inflated to minimal occlusive pressure)    Inflation Amount (mL):  9    Tube secured:  21    Secured at:  The lips    Placement Verified By:  Capnometry    Complicating Factors:  Anterior larynx and poor neck/head extension    Findings Post-Intubation:  BS equal bilateral and atraumatic/condition of teeth unchanged

## 2025-01-21 ENCOUNTER — TELEPHONE (OUTPATIENT)
Dept: SURGERY | Facility: CLINIC | Age: 80
End: 2025-01-21
Payer: MEDICARE

## 2025-01-29 ENCOUNTER — OFFICE VISIT (OUTPATIENT)
Dept: SURGERY | Facility: CLINIC | Age: 80
End: 2025-01-29
Payer: MEDICARE

## 2025-01-29 VITALS
SYSTOLIC BLOOD PRESSURE: 169 MMHG | WEIGHT: 169.75 LBS | HEART RATE: 76 BPM | BODY MASS INDEX: 25.14 KG/M2 | HEIGHT: 69 IN | DIASTOLIC BLOOD PRESSURE: 90 MMHG

## 2025-01-29 DIAGNOSIS — K40.90 RIGHT INGUINAL HERNIA: Primary | ICD-10-CM

## 2025-01-29 PROCEDURE — 3080F DIAST BP >= 90 MM HG: CPT | Mod: CPTII,S$GLB,,

## 2025-01-29 PROCEDURE — 3077F SYST BP >= 140 MM HG: CPT | Mod: CPTII,S$GLB,,

## 2025-01-29 PROCEDURE — 1159F MED LIST DOCD IN RCRD: CPT | Mod: CPTII,S$GLB,,

## 2025-01-29 PROCEDURE — 1126F AMNT PAIN NOTED NONE PRSNT: CPT | Mod: CPTII,S$GLB,,

## 2025-01-29 PROCEDURE — 99999 PR PBB SHADOW E&M-EST. PATIENT-LVL III: CPT | Mod: PBBFAC,,,

## 2025-01-29 PROCEDURE — 1160F RVW MEDS BY RX/DR IN RCRD: CPT | Mod: CPTII,S$GLB,,

## 2025-01-29 PROCEDURE — 99024 POSTOP FOLLOW-UP VISIT: CPT | Mod: S$GLB,,,

## 2025-01-29 NOTE — PROGRESS NOTES
History & Physical    SUBJECTIVE:     History of Present Illness:  Patient is a 79 y.o. male presents to discuss right inguinal hernia.  He reports feeling like it was gotten bigger over time.  Denies any discomfort.  Goes down when lying flat.    Initially referred for right inguinal hernia. Patient reports found incidentally on exam. Prior history of left inguinal hernia repair. Denies any discomfort, or bulge in the area. Going on trip soon and wanted to see if needed repair prior to vacation.    Interval History:  Since last clinic visit, the patient underwent open right inguinal hernia repair.  He initially had some soreness for 1-2 days, but this has since resolved.  He offers no complaints today.  He is tolerating regular diet and having normal bowel movements.  Denies any fevers, chills, nausea, or vomiting.  Denies any dysuria or hematuria.  Denies any scrotal edema or testicular tenderness.    Chief Complaint   Patient presents with    Post-op Evaluation       Review of patient's allergies indicates:   Allergen Reactions    No known drug allergies        Current Outpatient Medications   Medication Sig Dispense Refill    doxazosin (CARDURA) 2 MG tablet TAKE 2 TABLETS(4 MG) BY MOUTH EVERY EVENING 180 tablet 3    ergocalciferol (ERGOCALCIFEROL) 50,000 unit Cap TAKE 1 CAPSULE BY MOUTH EVERY 7 DAYS 12 capsule 3    levothyroxine (SYNTHROID) 25 MCG tablet TAKE 1 TABLET(25 MCG) BY MOUTH EVERY DAY 90 tablet 3     No current facility-administered medications for this visit.       Past Medical History:   Diagnosis Date    Arthritis     Bilateral low back pain 09/02/2021    Cancer     prostate    CKD (chronic kidney disease)     Dyslipidemia     HTN (hypertension)     Hyperlipidemia     Hypothyroidism     Prostate cancer     Radiation induced proctitis     Right inguinal hernia 01/07/2025    Thyroid disease      Past Surgical History:   Procedure Laterality Date    COLONOSCOPY N/A 4/3/2017    Procedure: COLONOSCOPY;   "Surgeon: Olivier Grayson MD;  Location: [facility];  Service: Endoscopy;  Laterality: N/A;    COLONOSCOPY N/A 8/3/2022    Procedure: COLONOSCOPY;  Surgeon: Ramila Burgess DO;  Location: [facility];  Service: General;  Laterality: N/A;    HERNIA REPAIR  2009 approx    REPAIR, HERNIA, INGUINAL Right 1/7/2025    Procedure: REPAIR, HERNIA, INGUINAL;  Surgeon: Santi Pedroza MD;  Location: [facility] OR;  Service: General;  Laterality: Right;  Open right inguinal hernia repair    seed implants in prostate  02/2009    TONSILLECTOMY       Family History   Problem Relation Name Age of Onset    Stroke Mother      Cancer Mother      Colon cancer Mother      Diabetes Father      Cancer Sister          lung    Cancer Brother      Bone cancer Brother      Heart disease Maternal Grandmother       Social History     Tobacco Use    Smoking status: Never    Smokeless tobacco: Never   Substance Use Topics    Alcohol use: No    Drug use: No        Review of Systems:  Review of Systems   Constitutional:  Negative for chills, fever and unexpected weight change.   HENT:  Negative for congestion.    Eyes:  Negative for visual disturbance.   Respiratory:  Negative for shortness of breath.    Cardiovascular:  Negative for chest pain.   Gastrointestinal:  Negative for abdominal distention, abdominal pain, constipation, nausea, rectal pain and vomiting.   Genitourinary:  Negative for dysuria, scrotal swelling and testicular pain.   Musculoskeletal:  Negative for arthralgias.   Skin:  Negative for rash.   Neurological:  Negative for light-headedness.   Hematological:  Negative for adenopathy.       OBJECTIVE:     Vital Signs (Most Recent)  Pulse: 76 (01/29/25 1546)  BP: (!) 169/90 (01/29/25 1546)  5' 9" (1.753 m)  77 kg (169 lb 12.1 oz)     Physical Exam:  Physical Exam  Vitals reviewed.   Constitutional:       General: He is not in acute distress.     Appearance: He is well-developed. He is not toxic-appearing.   HENT:      Head: Normocephalic " and atraumatic.      Right Ear: External ear normal.      Left Ear: External ear normal.      Nose: Nose normal.      Mouth/Throat:      Mouth: Mucous membranes are moist.   Eyes:      Extraocular Movements: Extraocular movements intact.      Conjunctiva/sclera: Conjunctivae normal.   Cardiovascular:      Rate and Rhythm: Normal rate.   Pulmonary:      Effort: Pulmonary effort is normal. No respiratory distress.   Abdominal:      Comments: Abdomen is soft, nontender, nondistended.  Right inguinal incision is clean dry and intact without signs of infection, healing well.  No evidence of seroma, hematoma or, or hernia recurrence.   Musculoskeletal:      Cervical back: Normal range of motion and neck supple.   Skin:     General: Skin is warm and dry.   Neurological:      Mental Status: He is alert and oriented to person, place, and time.   Psychiatric:         Behavior: Behavior normal.           ASSESSMENT/PLAN:     78 y/o male with right inguinal hernia now status post open repair who is recovering as expected.    - continue light duty, no lifting over 10 lb, no repetitive bending, twisting, pushing, pulling for a total of 6 weeks postoperatively to reduce the risk of hernia recurrence.    -no further interventions or restrictions otherwise.    -return to clinic 1 month or as needed.      Cyn Marie PA-C  [facility]

## 2025-03-10 ENCOUNTER — TELEPHONE (OUTPATIENT)
Dept: NEPHROLOGY | Facility: CLINIC | Age: 80
End: 2025-03-10
Payer: MEDICARE

## 2025-03-10 NOTE — TELEPHONE ENCOUNTER
L/m that dr sol wont be in the week of the 23rd so I cna put him at the [facility] TME AND DATE WITH THE N.P.  HE WILL NEED TO DO LAB THE WEEK PRIOR. 3/10/25/SF

## 2025-03-20 ENCOUNTER — LAB VISIT (OUTPATIENT)
Dept: LAB | Facility: HOSPITAL | Age: 80
End: 2025-03-20
Attending: INTERNAL MEDICINE
Payer: MEDICARE

## 2025-03-20 DIAGNOSIS — N18.30 STAGE 3 CHRONIC KIDNEY DISEASE, UNSPECIFIED WHETHER STAGE 3A OR 3B CKD: ICD-10-CM

## 2025-03-20 LAB
ALBUMIN SERPL BCP-MCNC: 3.7 G/DL (ref 3.5–5.2)
ANION GAP SERPL CALC-SCNC: 8 MMOL/L (ref 8–16)
BUN SERPL-MCNC: 21 MG/DL (ref 8–23)
CALCIUM SERPL-MCNC: 8.9 MG/DL (ref 8.7–10.5)
CHLORIDE SERPL-SCNC: 109 MMOL/L (ref 95–110)
CO2 SERPL-SCNC: 22 MMOL/L (ref 23–29)
CREAT SERPL-MCNC: 1.1 MG/DL (ref 0.5–1.4)
EST. GFR  (NO RACE VARIABLE): >60 ML/MIN/1.73 M^2
GLUCOSE SERPL-MCNC: 96 MG/DL (ref 70–110)
PHOSPHATE SERPL-MCNC: 2.8 MG/DL (ref 2.7–4.5)
POTASSIUM SERPL-SCNC: 4.1 MMOL/L (ref 3.5–5.1)
SODIUM SERPL-SCNC: 139 MMOL/L (ref 136–145)

## 2025-03-20 PROCEDURE — 80069 RENAL FUNCTION PANEL: CPT | Performed by: INTERNAL MEDICINE

## 2025-03-20 PROCEDURE — 36415 COLL VENOUS BLD VENIPUNCTURE: CPT | Performed by: INTERNAL MEDICINE

## 2025-03-21 NOTE — PATIENT INSTRUCTIONS
Kidney Health Tips     Low sodium diet: avoid/limit salt, processed foods (boxed or canned), fried foods, fast foods, etc as discussed  Low carbohydrate/sugar diet: avoid/limit sugary drinks, white breads, pasta, rice, etc as discussed  Exercise as tolerated. Physical activity improves blood flow to the kidneys.  Ensure adequate hydration- Water is best, flavored water/diluted juice (drink to thirst) daily as tolerated with no swelling/shortness of breath  Avoid NSAIDS: Advil, Ibuprofen, Aleve, Naproxen, Meloxicam, etc. (Aspirin is ok), Tylenol is Best as needed for pain/fever  Keep blood pressure under control. Take meds as ordered.  Increase fruit and vegetable consumption: These provide essential nutrients and antioxidants which support kidney health.   Follow up as scheduled, labs 1 week prior to visit.

## 2025-03-28 ENCOUNTER — LAB VISIT (OUTPATIENT)
Dept: LAB | Facility: HOSPITAL | Age: 80
End: 2025-03-28
Payer: MEDICARE

## 2025-03-28 ENCOUNTER — OFFICE VISIT (OUTPATIENT)
Dept: NEPHROLOGY | Facility: CLINIC | Age: 80
End: 2025-03-28
Payer: MEDICARE

## 2025-03-28 VITALS
HEART RATE: 61 BPM | BODY MASS INDEX: 25.44 KG/M2 | RESPIRATION RATE: 18 BRPM | SYSTOLIC BLOOD PRESSURE: 138 MMHG | HEIGHT: 69 IN | WEIGHT: 171.75 LBS | DIASTOLIC BLOOD PRESSURE: 94 MMHG

## 2025-03-28 DIAGNOSIS — E55.9 VITAMIN D DEFICIENCY, UNSPECIFIED: ICD-10-CM

## 2025-03-28 DIAGNOSIS — N18.31 STAGE 3A CHRONIC KIDNEY DISEASE: Primary | ICD-10-CM

## 2025-03-28 DIAGNOSIS — N25.81 SECONDARY HYPERPARATHYROIDISM OF RENAL ORIGIN: ICD-10-CM

## 2025-03-28 DIAGNOSIS — I10 PRIMARY HYPERTENSION: ICD-10-CM

## 2025-03-28 DIAGNOSIS — N18.31 STAGE 3A CHRONIC KIDNEY DISEASE: ICD-10-CM

## 2025-03-28 LAB
BILIRUB UR QL STRIP.AUTO: NEGATIVE
CLARITY UR: CLEAR
COLOR UR AUTO: YELLOW
CREAT UR-MCNC: 133.1 MG/DL (ref 23–375)
GLUCOSE UR QL STRIP: NEGATIVE
HGB UR QL STRIP: NEGATIVE
KETONES UR QL STRIP: NEGATIVE
LEUKOCYTE ESTERASE UR QL STRIP: NEGATIVE
NITRITE UR QL STRIP: NEGATIVE
PH UR STRIP: 6 [PH]
PROT UR QL STRIP: NEGATIVE
PROT UR-MCNC: 10 MG/DL
PROT/CREAT UR: 0.08 MG/G{CREAT}
SP GR UR STRIP: >=1.03

## 2025-03-28 PROCEDURE — 99999 PR PBB SHADOW E&M-EST. PATIENT-LVL III: CPT | Mod: PBBFAC,,, | Performed by: NURSE PRACTITIONER

## 2025-03-28 PROCEDURE — 82570 ASSAY OF URINE CREATININE: CPT

## 2025-03-28 PROCEDURE — 81003 URINALYSIS AUTO W/O SCOPE: CPT

## 2025-04-17 ENCOUNTER — LAB VISIT (OUTPATIENT)
Dept: LAB | Facility: HOSPITAL | Age: 80
End: 2025-04-17
Attending: INTERNAL MEDICINE
Payer: MEDICARE

## 2025-04-17 ENCOUNTER — OFFICE VISIT (OUTPATIENT)
Dept: FAMILY MEDICINE | Facility: CLINIC | Age: 80
End: 2025-04-17
Payer: MEDICARE

## 2025-04-17 VITALS
HEART RATE: 59 BPM | WEIGHT: 169.56 LBS | BODY MASS INDEX: 25.11 KG/M2 | DIASTOLIC BLOOD PRESSURE: 78 MMHG | SYSTOLIC BLOOD PRESSURE: 134 MMHG | TEMPERATURE: 96 F | OXYGEN SATURATION: 98 % | HEIGHT: 69 IN

## 2025-04-17 DIAGNOSIS — Z12.11 COLON CANCER SCREENING: ICD-10-CM

## 2025-04-17 DIAGNOSIS — E78.5 DYSLIPIDEMIA: ICD-10-CM

## 2025-04-17 DIAGNOSIS — D64.9 ANEMIA, UNSPECIFIED TYPE: ICD-10-CM

## 2025-04-17 DIAGNOSIS — Z85.46 HISTORY OF PROSTATE CANCER: ICD-10-CM

## 2025-04-17 DIAGNOSIS — I10 PRIMARY HYPERTENSION: ICD-10-CM

## 2025-04-17 DIAGNOSIS — E03.9 ACQUIRED HYPOTHYROIDISM: ICD-10-CM

## 2025-04-17 DIAGNOSIS — E03.9 ACQUIRED HYPOTHYROIDISM: Primary | ICD-10-CM

## 2025-04-17 LAB
CHOLEST SERPL-MCNC: 198 MG/DL (ref 120–199)
CHOLEST/HDLC SERPL: 2.7 {RATIO} (ref 2–5)
HDLC SERPL-MCNC: 74 MG/DL (ref 40–75)
HDLC SERPL: 37.4 % (ref 20–50)
LDLC SERPL CALC-MCNC: 118.4 MG/DL (ref 63–159)
NONHDLC SERPL-MCNC: 124 MG/DL
TRIGL SERPL-MCNC: 28 MG/DL (ref 30–150)
TSH SERPL-ACNC: 2.48 UIU/ML (ref 0.4–4)

## 2025-04-17 PROCEDURE — 36415 COLL VENOUS BLD VENIPUNCTURE: CPT | Mod: PO

## 2025-04-17 PROCEDURE — 1126F AMNT PAIN NOTED NONE PRSNT: CPT | Mod: CPTII,S$GLB,, | Performed by: INTERNAL MEDICINE

## 2025-04-17 PROCEDURE — G2211 COMPLEX E/M VISIT ADD ON: HCPCS | Mod: S$GLB,,, | Performed by: INTERNAL MEDICINE

## 2025-04-17 PROCEDURE — 80061 LIPID PANEL: CPT

## 2025-04-17 PROCEDURE — 3075F SYST BP GE 130 - 139MM HG: CPT | Mod: CPTII,S$GLB,, | Performed by: INTERNAL MEDICINE

## 2025-04-17 PROCEDURE — 99214 OFFICE O/P EST MOD 30 MIN: CPT | Mod: S$GLB,,, | Performed by: INTERNAL MEDICINE

## 2025-04-17 PROCEDURE — 84443 ASSAY THYROID STIM HORMONE: CPT

## 2025-04-17 PROCEDURE — 3078F DIAST BP <80 MM HG: CPT | Mod: CPTII,S$GLB,, | Performed by: INTERNAL MEDICINE

## 2025-04-17 PROCEDURE — 1159F MED LIST DOCD IN RCRD: CPT | Mod: CPTII,S$GLB,, | Performed by: INTERNAL MEDICINE

## 2025-04-17 PROCEDURE — 99999 PR PBB SHADOW E&M-EST. PATIENT-LVL IV: CPT | Mod: PBBFAC,,, | Performed by: INTERNAL MEDICINE

## 2025-04-17 RX ORDER — MULTIVITAMIN WITH IRON
2 TABLET ORAL DAILY
COMMUNITY
Start: 2025-04-01

## 2025-04-17 NOTE — PROGRESS NOTES
Subjective:       Patient ID: David Amaro is a 79 y.o. male.    Chief Complaint: Follow-up (6 month), Hypertension, Hyperlipidemia, Hypothyroidism, and Anemia    Follow-up  Pertinent negatives include no abdominal pain, arthralgias, chest pain, chills, coughing, diaphoresis, fatigue, fever, headaches, joint swelling, myalgias, nausea, neck pain, numbness, rash, sore throat, vomiting or weakness.   Hypertension  Pertinent negatives include no chest pain, headaches, neck pain, palpitations or shortness of breath.   Hyperlipidemia  Pertinent negatives include no chest pain, myalgias or shortness of breath.   Anemia  There has been no abdominal pain, bruising/bleeding easily, confusion, fever, light-headedness, pallor or palpitations.     Past Medical History:   Diagnosis Date    Arthritis     Bilateral low back pain 09/02/2021    Cancer     prostate    CKD (chronic kidney disease)     Dyslipidemia     HTN (hypertension)     Hyperlipidemia     Hypothyroidism     Prostate cancer     Radiation induced proctitis     Right inguinal hernia 01/07/2025    Thyroid disease      Past Surgical History:   Procedure Laterality Date    COLONOSCOPY N/A 4/3/2017    Procedure: COLONOSCOPY;  Surgeon: Olivier Grayson MD;  Location: [facility];  Service: Endoscopy;  Laterality: N/A;    COLONOSCOPY N/A 8/3/2022    Procedure: COLONOSCOPY;  Surgeon: Ramila Burgess DO;  Location: [facility];  Service: General;  Laterality: N/A;    HERNIA REPAIR  2009 approx    REPAIR, HERNIA, INGUINAL Right 1/7/2025    Procedure: REPAIR, HERNIA, INGUINAL;  Surgeon: Santi Pedroza MD;  Location: [facility] OR;  Service: General;  Laterality: Right;  Open right inguinal hernia repair    seed implants in prostate  02/2009    TONSILLECTOMY       Family History   Problem Relation Name Age of Onset    Stroke Mother      Cancer Mother      Colon cancer Mother      Diabetes Father      Cancer Sister          lung    Cancer Brother      Bone cancer Brother      Heart  disease Maternal Grandmother       Social History[1]  Review of Systems   Constitutional:  Negative for activity change, appetite change, chills, diaphoresis, fatigue, fever and unexpected weight change.   HENT:  Negative for drooling, ear discharge, ear pain, facial swelling, hearing loss, mouth sores, nosebleeds, postnasal drip, rhinorrhea, sinus pressure, sneezing, sore throat, tinnitus, trouble swallowing and voice change.    Eyes:  Negative for photophobia, redness and visual disturbance.   Respiratory:  Negative for apnea, cough, choking, chest tightness, shortness of breath and wheezing.    Cardiovascular:  Negative for chest pain, palpitations and leg swelling.   Gastrointestinal:  Negative for abdominal distention, abdominal pain, anal bleeding, blood in stool, constipation, diarrhea, nausea, rectal pain and vomiting.   Endocrine: Negative for cold intolerance, heat intolerance, polydipsia, polyphagia and polyuria.   Genitourinary:  Negative for difficulty urinating, dysuria, enuresis, flank pain, frequency, genital sores, hematuria and urgency.   Musculoskeletal:  Negative for arthralgias, back pain, gait problem, joint swelling, myalgias, neck pain and neck stiffness.   Skin:  Negative for color change, pallor, rash and wound.   Allergic/Immunologic: Negative for food allergies and immunocompromised state.   Neurological:  Negative for dizziness, tremors, seizures, syncope, facial asymmetry, speech difficulty, weakness, light-headedness, numbness and headaches.   Hematological:  Negative for adenopathy. Does not bruise/bleed easily.   Psychiatric/Behavioral:  Negative for agitation, behavioral problems, confusion, decreased concentration, dysphoric mood, hallucinations, self-injury, sleep disturbance and suicidal ideas. The patient is not nervous/anxious and is not hyperactive.        Objective:      Physical Exam  Vitals and nursing note reviewed.   Constitutional:       General: He is not in acute  distress.     Appearance: Normal appearance. He is well-developed. He is not diaphoretic.   HENT:      Head: Normocephalic and atraumatic.      Mouth/Throat:      Pharynx: No oropharyngeal exudate.   Eyes:      General: No scleral icterus.     Pupils: Pupils are equal, round, and reactive to light.   Neck:      Thyroid: No thyromegaly.      Vascular: No carotid bruit or JVD.      Trachea: No tracheal deviation.   Cardiovascular:      Rate and Rhythm: Normal rate and regular rhythm.      Heart sounds: Normal heart sounds.   Pulmonary:      Effort: Pulmonary effort is normal. No respiratory distress.      Breath sounds: Normal breath sounds. No wheezing or rales.   Chest:      Chest wall: No tenderness.   Abdominal:      General: Bowel sounds are normal. There is no distension.      Palpations: Abdomen is soft.      Tenderness: There is no abdominal tenderness. There is no guarding or rebound.   Musculoskeletal:         General: No tenderness. Normal range of motion.      Cervical back: Normal range of motion and neck supple.   Lymphadenopathy:      Cervical: No cervical adenopathy.   Skin:     General: Skin is warm and dry.      Coloration: Skin is not pale.      Findings: No erythema or rash.   Neurological:      Mental Status: He is alert and oriented to person, place, and time.      Cranial Nerves: No cranial nerve deficit.      Coordination: Coordination normal.   Psychiatric:         Behavior: Behavior normal.         Thought Content: Thought content normal.         Judgment: Judgment normal.         CMP  Sodium   Date Value Ref Range Status   03/20/2025 139 136 - 145 mmol/L Final     Potassium   Date Value Ref Range Status   03/20/2025 4.1 3.5 - 5.1 mmol/L Final     Chloride   Date Value Ref Range Status   03/20/2025 109 95 - 110 mmol/L Final     CO2   Date Value Ref Range Status   03/20/2025 22 (L) 23 - 29 mmol/L Final     Glucose   Date Value Ref Range Status   03/20/2025 96 70 - 110 mg/dL Final     BUN    Date Value Ref Range Status   03/20/2025 21 8 - 23 mg/dL Final     Creatinine   Date Value Ref Range Status   03/20/2025 1.1 0.5 - 1.4 mg/dL Final     Calcium   Date Value Ref Range Status   03/20/2025 8.9 8.7 - 10.5 mg/dL Final     Total Protein   Date Value Ref Range Status   12/30/2024 6.9 6.0 - 8.4 g/dL Final     Albumin   Date Value Ref Range Status   03/20/2025 3.7 3.5 - 5.2 g/dL Final     Total Bilirubin   Date Value Ref Range Status   12/30/2024 0.3 0.1 - 1.0 mg/dL Final     Comment:     For infants and newborns, interpretation of results should be based  on gestational age, weight and in agreement with clinical  observations.    Premature Infant recommended reference ranges:  Up to 24 hours.............<8.0 mg/dL  Up to 48 hours............<12.0 mg/dL  3-5 days..................<15.0 mg/dL  6-29 days.................<15.0 mg/dL       Alkaline Phosphatase   Date Value Ref Range Status   12/30/2024 79 40 - 150 U/L Final     AST   Date Value Ref Range Status   12/30/2024 20 10 - 40 U/L Final     ALT   Date Value Ref Range Status   12/30/2024 16 10 - 44 U/L Final     Anion Gap   Date Value Ref Range Status   03/20/2025 8 8 - 16 mmol/L Final     eGFR if    Date Value Ref Range Status   01/14/2022 >60.0 >60 mL/min/1.73 m^2 Final     eGFR if non    Date Value Ref Range Status   01/14/2022 53.4 (A) >60 mL/min/1.73 m^2 Final     Comment:     Calculation used to obtain the estimated glomerular filtration  rate (eGFR) is the CKD-EPI equation.        Lab Results   Component Value Date    WBC 6.13 12/30/2024    HGB 12.5 (L) 12/30/2024    HCT 39.6 (L) 12/30/2024    MCV 97 12/30/2024     12/30/2024     Lab Results   Component Value Date    CHOL 201 (H) 04/17/2024     Lab Results   Component Value Date    HDL 70 04/17/2024     Lab Results   Component Value Date    LDLCALC 124.2 04/17/2024     Lab Results   Component Value Date    TRIG 34 04/17/2024     Lab Results   Component Value  Date    CHOLHDL 34.8 04/17/2024     Lab Results   Component Value Date    TSH 2.879 04/17/2024     Lab Results   Component Value Date    HGBA1C 5.7 12/20/2005     Assessment:       1. Acquired hypothyroidism    2. Primary hypertension    3. History of prostate cancer    4. Dyslipidemia    5. Anemia, unspecified type    6. Colon cancer screening        Plan:   Acquired hypothyroidism  -     TSH; Future; Expected date: 04/17/2025    Primary hypertension    History of prostate cancer    Dyslipidemia  -     Lipid Panel; Future; Expected date: 04/17/2025    Anemia, unspecified type    Colon cancer screening  -     Ambulatory referral/consult to Endo Procedure ; Future; Expected date: 04/18/2025      Stable--------continue meds----------as above-----------------sees cards dr cunningham------------sees urology-------------------------f/u 6 months-------------       [1]   Social History  Socioeconomic History    Marital status:    Tobacco Use    Smoking status: Never    Smokeless tobacco: Never   Substance and Sexual Activity    Alcohol use: No    Drug use: No    Sexual activity: Yes     Partners: Female     Social Drivers of Health     Financial Resource Strain: Low Risk  (4/14/2025)    Overall Financial Resource Strain (CARDIA)     Difficulty of Paying Living Expenses: Not hard at all   Food Insecurity: No Food Insecurity (4/14/2025)    Hunger Vital Sign     Worried About Running Out of Food in the Last Year: Never true     Ran Out of Food in the Last Year: Never true   Transportation Needs: No Transportation Needs (4/14/2025)    PRAPARE - Transportation     Lack of Transportation (Medical): No     Lack of Transportation (Non-Medical): No   Physical Activity: Insufficiently Active (4/14/2025)    Exercise Vital Sign     Days of Exercise per Week: 3 days     Minutes of Exercise per Session: 40 min   Stress: No Stress Concern Present (4/14/2025)    [employer] [place] [employer] - Occupational Stress  Questionnaire     Feeling of Stress : Not at all   Housing Stability: Low Risk  (4/14/2025)    Housing Stability Vital Sign     Unable to Pay for Housing in the Last Year: No     Number of Times Moved in the Last Year: 0     Homeless in the Last Year: No

## 2025-04-18 ENCOUNTER — RESULTS FOLLOW-UP (OUTPATIENT)
Dept: FAMILY MEDICINE | Facility: CLINIC | Age: 80
End: 2025-04-18

## 2025-06-02 ENCOUNTER — HOSPITAL ENCOUNTER (OUTPATIENT)
Dept: PREADMISSION TESTING | Facility: HOSPITAL | Age: 80
Discharge: HOME | End: 2025-06-02
Attending: FAMILY MEDICINE
Payer: MEDICARE

## 2025-06-02 DIAGNOSIS — Z12.11 COLON CANCER SCREENING: ICD-10-CM

## 2025-06-14 RX ORDER — LEVOTHYROXINE SODIUM 25 UG/1
25 TABLET ORAL
Qty: 90 TABLET | Refills: 3 | Status: SHIPPED | OUTPATIENT
Start: 2025-06-14

## 2025-06-14 NOTE — TELEPHONE ENCOUNTER
Care Due:                  Date            Visit Type   Department     Provider  --------------------------------------------------------------------------------                                EP -                              PRIMARY      [facility]  Last Visit: 04-      CARE ([facility])   MEDICINE       Stepan Pierre                              EP -                              PRIMARY      [facility]  Next Visit: 10-      CARE ([facility])   EUSEBIO Pierre                                                            Last  Test          Frequency    Reason                     Performed    Due Date  --------------------------------------------------------------------------------    Vitamin D...  12 months..  ergocalciferol...........  Not Found    Overdue    [employer] Embedded Care Due Messages. Reference number: [id number].   6/14/2025 4:37:22 AM CDT

## 2025-06-14 NOTE — TELEPHONE ENCOUNTER
Refill Decision Note   David Vincentus  is requesting a refill authorization.  Brief Assessment and Rationale for Refill:  Approve     Medication Therapy Plan:        Comments:     Note composed:11:00 AM 06/14/2025

## 2025-07-23 ENCOUNTER — HOSPITAL ENCOUNTER (OUTPATIENT)
Dept: PREADMISSION TESTING | Facility: HOSPITAL | Age: 80
Discharge: HOME | End: 2025-07-23
Attending: FAMILY MEDICINE
Payer: MEDICARE

## 2025-07-23 DIAGNOSIS — Z12.11 COLON CANCER SCREENING: Primary | ICD-10-CM

## 2025-07-23 RX ORDER — SODIUM, POTASSIUM,MAG SULFATES 17.5-3.13G
1 SOLUTION, RECONSTITUTED, ORAL ORAL DAILY
Qty: 1 KIT | Refills: 0 | Status: SHIPPED | OUTPATIENT
Start: 2025-07-23 | End: 2025-07-25

## 2025-08-05 ENCOUNTER — ANESTHESIA (OUTPATIENT)
Dept: ENDOSCOPY | Facility: HOSPITAL | Age: 80
End: 2025-08-05
Payer: MEDICARE

## 2025-08-05 ENCOUNTER — HOSPITAL ENCOUNTER (OUTPATIENT)
Dept: ENDOSCOPY | Facility: HOSPITAL | Age: 80
Discharge: HOME | End: 2025-08-05
Attending: INTERNAL MEDICINE | Admitting: FAMILY MEDICINE
Payer: MEDICARE

## 2025-08-05 ENCOUNTER — ANESTHESIA EVENT (OUTPATIENT)
Dept: ENDOSCOPY | Facility: HOSPITAL | Age: 80
End: 2025-08-05
Payer: MEDICARE

## 2025-08-05 DIAGNOSIS — Z80.0 FAMILY HISTORY OF COLON CANCER: ICD-10-CM

## 2025-08-05 DIAGNOSIS — K63.5 POLYP OF COLON, UNSPECIFIED PART OF COLON, UNSPECIFIED TYPE: ICD-10-CM

## 2025-08-05 DIAGNOSIS — Z86.0100 HISTORY OF COLON POLYPS: ICD-10-CM

## 2025-08-05 DIAGNOSIS — Z12.11 COLON CANCER SCREENING: Primary | ICD-10-CM

## 2025-08-05 DIAGNOSIS — K57.30 DIVERTICULOSIS OF COLON: ICD-10-CM

## 2025-08-05 PROCEDURE — 27201012 HC FORCEPS, HOT/COLD, DISP: Performed by: FAMILY MEDICINE

## 2025-08-05 PROCEDURE — 27201089 HC SNARE, DISP (ANY): Performed by: FAMILY MEDICINE

## 2025-08-05 PROCEDURE — 37000009 HC ANESTHESIA EA ADD 15 MINS

## 2025-08-05 PROCEDURE — 37000008 HC ANESTHESIA 1ST 15 MINUTES

## 2025-08-05 PROCEDURE — 25000003 PHARM REV CODE 250: Performed by: FAMILY MEDICINE

## 2025-08-05 PROCEDURE — 88305 TISSUE EXAM BY PATHOLOGIST: CPT | Mod: TC,91 | Performed by: FAMILY MEDICINE

## 2025-08-05 PROCEDURE — 63600175 PHARM REV CODE 636 W HCPCS: Performed by: NURSE ANESTHETIST, CERTIFIED REGISTERED

## 2025-08-05 RX ORDER — PROPOFOL 10 MG/ML
VIAL (ML) INTRAVENOUS
Status: DISCONTINUED | OUTPATIENT
Start: 2025-08-05 | End: 2025-08-05

## 2025-08-05 RX ORDER — SODIUM CHLORIDE 9 MG/ML
INJECTION, SOLUTION INTRAVENOUS CONTINUOUS
Status: DISCONTINUED | OUTPATIENT
Start: 2025-08-05 | End: 2025-08-06 | Stop reason: HOSPADM

## 2025-08-05 RX ORDER — DEXTROMETHORPHAN/PSEUDOEPHED 2.5-7.5/.8
DROPS ORAL
Status: COMPLETED | OUTPATIENT
Start: 2025-08-05 | End: 2025-08-05

## 2025-08-05 RX ORDER — LIDOCAINE HYDROCHLORIDE 10 MG/ML
INJECTION, SOLUTION EPIDURAL; INFILTRATION; INTRACAUDAL; PERINEURAL
Status: DISCONTINUED | OUTPATIENT
Start: 2025-08-05 | End: 2025-08-05

## 2025-08-05 RX ADMIN — PROPOFOL 70 MG: 10 INJECTION, EMULSION INTRAVENOUS at 09:08

## 2025-08-05 RX ADMIN — PROPOFOL 40 MG: 10 INJECTION, EMULSION INTRAVENOUS at 09:08

## 2025-08-05 RX ADMIN — PROPOFOL 30 MG: 10 INJECTION, EMULSION INTRAVENOUS at 09:08

## 2025-08-05 RX ADMIN — LIDOCAINE HYDROCHLORIDE 50 MG: 10 SOLUTION INTRAVENOUS at 09:08

## 2025-08-05 RX ADMIN — SIMETHICONE 200 MG: 20 SUSPENSION/ DROPS ORAL at 09:08

## 2025-08-05 NOTE — TRANSFER OF CARE
"Anesthesia Transfer of Care Note    Patient: David Amaro    Procedure(s) Performed: * No procedures listed *    Patient location: GI    Anesthesia Type: MAC    Transport from OR: Transported from OR on room air with adequate spontaneous ventilation    Post pain: adequate analgesia    Post assessment: no apparent anesthetic complications and tolerated procedure well    Post vital signs: stable    Level of consciousness: responds to stimulation    Nausea/Vomiting: no nausea/vomiting    Complications: none    Transfer of care protocol was followed      Last vitals: Visit Vitals  BP (!) 158/76 (BP Location: Left arm, Patient Position: Lying)   Pulse 82   Temp 36.9 °C (98.4 °F)   Resp 18   Ht 5' 9" (1.753 m)   Wt 74.8 kg (165 lb)   SpO2 97%   BMI 24.37 kg/m²     "

## 2025-08-05 NOTE — ANESTHESIA POSTPROCEDURE EVALUATION
Anesthesia Post Evaluation    Patient: David Amaro    Procedure(s) Performed: * No procedures listed *    Final Anesthesia Type: MAC      Patient location during evaluation: GI PACU  Patient participation: Yes- Able to Participate  Level of consciousness: awake and alert and oriented  Post-procedure vital signs: reviewed and stable  Pain management: adequate  Airway patency: patent  DOMENICA mitigation strategies: Multimodal analgesia  PONV status at discharge: No PONV  Anesthetic complications: no      Cardiovascular status: hemodynamically stable  Respiratory status: unassisted, spontaneous ventilation and room air  Hydration status: euvolemic  Follow-up not needed.              Vitals Value Taken Time   BP 99/60 08/05/25 09:46   Temp 36.7 °C (98.1 °F) 08/05/25 09:46   Pulse 65 08/05/25 09:46   Resp 16 08/05/25 09:46   SpO2 97 % 08/05/25 09:46         No case tracking events are documented in the log.      Pain/Joy Score: No data recorded

## 2025-08-05 NOTE — H&P
Short Stay Endoscopy History and Physical    PCP - Stepan Pierre MD    Procedure - Colonoscopy  ASA - 3  Mallampati - per anesthesia  History of Anesthesia problems - no  Family history Anesthesia problems -  no     HPI:  This is a 80 y.o. male here for evaluation of :   Active Hospital Problems    Diagnosis  POA    *Colon cancer screening [Z12.11]  Not Applicable    Family history of colon cancer [Z80.0]  Not Applicable    History of colon polyps [Z86.0100]  Not Applicable      Resolved Hospital Problems   No resolved problems to display.         Health Maintenance         Date Due Completion Date    Shingles Vaccine (2 of 3) 09/24/2012 7/30/2012    PROSTATE-SPECIFIC ANTIGEN 01/19/2023 1/19/2022    Override on 7/31/2019: Done (Dr. Velez -urologist)    Override on 4/8/2008: Done    Influenza Vaccine (1) 09/01/2025 10/17/2024    Lipid Panel 04/17/2026 4/17/2025            Screening - Yes  History of polyps - Yes     Diarrhea - no  Anemia - no  Blood in stools - no  Abdominal pain - no  Other - no    ROS:  CONSTITUTIONAL: Denies weight change,  fatigue, fevers, chills, night sweats.  CARDIOVASCULAR: Denies chest pain, shortness of breath, orthopnea and edema.  RESPIRATORY: Denies cough, hemoptysis, dyspnea, and wheezing.  GI: See HPI.    Medical History:   Past Medical History:   Diagnosis Date    Arthritis     Bilateral low back pain 09/02/2021    Cancer     prostate    CKD (chronic kidney disease)     Colon cancer screening 8/5/2025    Dyslipidemia     HTN (hypertension)     Hyperlipidemia     Hypothyroidism     Prostate cancer     Radiation induced proctitis     Right inguinal hernia 01/07/2025    Thyroid disease        Surgical History:   Past Surgical History:   Procedure Laterality Date    COLONOSCOPY N/A 4/3/2017    Procedure: COLONOSCOPY;  Surgeon: Olivier Grayson MD;  Location: [facility];  Service: Endoscopy;  Laterality: N/A;    COLONOSCOPY N/A 8/3/2022    Procedure: COLONOSCOPY;  Surgeon:  Ramila Burgess DO;  Location: [facility];  Service: General;  Laterality: N/A;    HERNIA REPAIR  2009 approx    REPAIR, HERNIA, INGUINAL Right 1/7/2025    Procedure: REPAIR, HERNIA, INGUINAL;  Surgeon: Santi Pedroza MD;  Location: [facility] OR;  Service: General;  Laterality: Right;  Open right inguinal hernia repair    seed implants in prostate  02/2009    TONSILLECTOMY         Family History:   Family History   Problem Relation Name Age of Onset    Stroke Mother      Cancer Mother      Colon cancer Mother      Diabetes Father      Cancer Sister          lung    Cancer Brother      Bone cancer Brother      Heart disease Maternal Grandmother         Social History:   Social History[1]    Allergies:   Review of patient's allergies indicates:   Allergen Reactions    No known drug allergies        Medications:   Medications Ordered Prior to Encounter[2]    Physical Exam:  Vital Signs:   Vitals:    08/05/25 0749   BP: (!) 158/76   Pulse: 82   Resp: 18   Temp: 98.4 °F (36.9 °C)     General Appearance: Well appearing in no acute distress  ENT: OP clear  Chest: CTA B  CV: RRR, no m/r/g  Abd: s/nt/nd/nabs  Ext: no edema    Labs:Reviewed    IMP:  Active Hospital Problems    Diagnosis  POA    *Colon cancer screening [Z12.11]  Not Applicable    Family history of colon cancer [Z80.0]  Not Applicable    History of colon polyps [Z86.0100]  Not Applicable      Resolved Hospital Problems   No resolved problems to display.         Plan:   I have explained the risks and benefits of colonoscopy to the patient including but not limited to bleeding, perforation, infection, and death. The patient wishes to proceed.         [1]   Social History  Tobacco Use    Smoking status: Never    Smokeless tobacco: Never   Substance Use Topics    Alcohol use: No    Drug use: No   [2]   Current Outpatient Medications on File Prior to Encounter   Medication Sig Dispense Refill    doxazosin (CARDURA) 2 MG tablet TAKE 2 TABLETS(4 MG) BY MOUTH EVERY  EVENING 180 tablet 3    ergocalciferol (ERGOCALCIFEROL) 50,000 unit Cap TAKE 1 CAPSULE BY MOUTH EVERY 7 DAYS 12 capsule 3    levothyroxine (SYNTHROID) 25 MCG tablet TAKE 1 TABLET(25 MCG) BY MOUTH EVERY DAY 90 tablet 3    omega-3 fatty acids/fish oil (FISH OIL-OMEGA-3 FATTY ACIDS) 300-1,000 mg capsule Take 2 capsules by mouth once daily.       No current facility-administered medications on file prior to encounter.

## 2025-08-05 NOTE — ANESTHESIA PREPROCEDURE EVALUATION
08/05/2025  David Amaro is a 80 y.o., male.  Past Medical History:   Diagnosis Date    Arthritis     Bilateral low back pain 09/02/2021    Cancer     prostate    CKD (chronic kidney disease)     Colon cancer screening 8/5/2025    Dyslipidemia     HTN (hypertension)     Hyperlipidemia     Hypothyroidism     Prostate cancer     Radiation induced proctitis     Right inguinal hernia 01/07/2025    Thyroid disease      Past Surgical History:   Procedure Laterality Date    COLONOSCOPY N/A 4/3/2017    Procedure: COLONOSCOPY;  Surgeon: Olivier Grayson MD;  Location: [facility] ENDO;  Service: Endoscopy;  Laterality: N/A;    COLONOSCOPY N/A 8/3/2022    Procedure: COLONOSCOPY;  Surgeon: Ramila Burgess DO;  Location: [facility] ENDO;  Service: General;  Laterality: N/A;    HERNIA REPAIR  2009 approx    REPAIR, HERNIA, INGUINAL Right 1/7/2025    Procedure: REPAIR, HERNIA, INGUINAL;  Surgeon: Santi Pedroza MD;  Location: [facility] OR;  Service: General;  Laterality: Right;  Open right inguinal hernia repair    seed implants in prostate  02/2009    TONSILLECTOMY           Pre-op Assessment    I have reviewed the Patient Summary Reports.    I have reviewed the NPO Status.   I have reviewed the Medications.     Review of Systems  Anesthesia Hx:  No problems with previous Anesthesia   History of prior surgery of interest to airway management or planning:  Previous anesthesia: General        Denies Family Hx of Anesthesia complications.    Denies Personal Hx of Anesthesia complications.                    Social:  Non-Smoker       Hematology/Oncology:  Hematology Normal                       --  Cancer in past history:                 Oncology Comments: Prostate CA.     EENT/Dental:  EENT/Dental Normal           Cardiovascular:     Hypertension           hyperlipidemia                               Pulmonary:  Pulmonary Normal                        Renal/:  Chronic Renal Disease, CKD  BPH              Hepatic/GI:  Hepatic/GI Normal Bowel Prep.                   Musculoskeletal:  Arthritis               Neurological:  Neurology Normal                                      Endocrine:   Hypothyroidism          Psych:  Psychiatric Normal                    Physical Exam  General: Well nourished, Cooperative, Alert and Oriented    Airway:  Mallampati: III   Mouth Opening: Small, but > 3cm  TM Distance: Normal  Tongue: Normal  Neck ROM: Extension Decreased    Dental:  Intact        Anesthesia Plan  Type of Anesthesia, risks & benefits discussed:    Anesthesia Type: MAC  Intra-op Monitoring Plan: Standard ASA Monitors  Post Op Pain Control Plan: multimodal analgesia and IV/PO Opioids PRN  Induction:  IV  Informed Consent: Informed consent signed with the Patient and all parties understand the risks and agree with anesthesia plan.  All questions answered. Patient consented to blood products? No  ASA Score: 3  Day of Surgery Review of History & Physical: H&P Update referred to the surgeon/provider.    Ready For Surgery From Anesthesia Perspective.     .

## 2025-08-06 VITALS
DIASTOLIC BLOOD PRESSURE: 78 MMHG | HEART RATE: 66 BPM | RESPIRATION RATE: 16 BRPM | TEMPERATURE: 98 F | BODY MASS INDEX: 24.44 KG/M2 | WEIGHT: 165 LBS | OXYGEN SATURATION: 98 % | HEIGHT: 69 IN | SYSTOLIC BLOOD PRESSURE: 135 MMHG

## 2025-08-06 LAB
ESTROGEN SERPL-MCNC: NORMAL PG/ML
INSULIN SERPL-ACNC: NORMAL U[IU]/ML
LAB AP CLINICAL INFORMATION: NORMAL
LAB AP GROSS DESCRIPTION: NORMAL
LAB AP PERFORMING LOCATION(S): NORMAL
LAB AP REPORT FOOTNOTES: NORMAL

## 2025-08-11 PROBLEM — Z12.11 COLON CANCER SCREENING: Status: RESOLVED | Noted: 2025-08-05 | Resolved: 2025-08-11

## 2025-08-12 ENCOUNTER — OFFICE VISIT (OUTPATIENT)
Dept: DERMATOLOGY | Facility: CLINIC | Age: 80
End: 2025-08-12
Payer: MEDICARE

## 2025-08-12 DIAGNOSIS — L82.0 SEBORRHEIC KERATOSIS, INFLAMED: Primary | ICD-10-CM

## 2025-08-12 PROCEDURE — 99999 PR PBB SHADOW E&M-EST. PATIENT-LVL III: CPT | Mod: PBBFAC,,, | Performed by: DERMATOLOGY

## 2025-08-12 PROCEDURE — 99202 OFFICE O/P NEW SF 15 MIN: CPT | Mod: S$GLB,,, | Performed by: DERMATOLOGY

## 2025-08-12 PROCEDURE — 1126F AMNT PAIN NOTED NONE PRSNT: CPT | Mod: CPTII,S$GLB,, | Performed by: DERMATOLOGY

## 2025-08-12 PROCEDURE — 1160F RVW MEDS BY RX/DR IN RCRD: CPT | Mod: CPTII,S$GLB,, | Performed by: DERMATOLOGY

## 2025-08-12 PROCEDURE — 1101F PT FALLS ASSESS-DOCD LE1/YR: CPT | Mod: CPTII,S$GLB,, | Performed by: DERMATOLOGY

## 2025-08-12 PROCEDURE — 1159F MED LIST DOCD IN RCRD: CPT | Mod: CPTII,S$GLB,, | Performed by: DERMATOLOGY

## 2025-08-12 PROCEDURE — 3288F FALL RISK ASSESSMENT DOCD: CPT | Mod: CPTII,S$GLB,, | Performed by: DERMATOLOGY

## 2025-09-03 RX ORDER — DOXAZOSIN 2 MG/1
TABLET ORAL
Qty: 180 TABLET | Refills: 3 | Status: SHIPPED | OUTPATIENT
Start: 2025-09-03

## (undated) DEVICE — GLOVE SURGICAL LATEX SZ 7

## (undated) DEVICE — KIT TURNOVER

## (undated) DEVICE — ADHESIVE DERMABOND ADVANCED

## (undated) DEVICE — COVER LIGHT HANDLE 80/CA

## (undated) DEVICE — DRESSING TRANS 4X4 TEGADERM

## (undated) DEVICE — UNDERGLOVES BIOGEL PI SZ 7 LF

## (undated) DEVICE — SUT NUR BR0 CLSR 8-18IN MO6

## (undated) DEVICE — NDL BLUNT W/O FILTER 18GX1.5IN

## (undated) DEVICE — SUT VICRYL CTD 2-0 GI 27 SH

## (undated) DEVICE — TAPE SILK 3IN

## (undated) DEVICE — DRAIN PENRS STERILE LTX 18X1/2

## (undated) DEVICE — CLIPPER BLADE MOD 4406 (CAREF)

## (undated) DEVICE — POSITIONER HEAD DONUT 9IN FOAM

## (undated) DEVICE — SUPPORT ULNA NERVE PROTECTOR

## (undated) DEVICE — MANIFOLD 4 PORT

## (undated) DEVICE — SOL 9P NACL IRR PIC IL

## (undated) DEVICE — DRAPE T TRNSVRS LAP 102X78X121

## (undated) DEVICE — SUT VICRYL 3-0 27 SH

## (undated) DEVICE — PACK BASIC SETUP SC BR

## (undated) DEVICE — SYR ONLY LUER LOCK 20CC

## (undated) DEVICE — SYR 10CC LUER LOCK

## (undated) DEVICE — SUT VICRYL 0 27 CT-2

## (undated) DEVICE — TOWEL OR DISP STRL BLUE 4/PK

## (undated) DEVICE — APPLICATOR CHLORAPREP ORN 26ML

## (undated) DEVICE — EVACUATOR PENCIL SMOKE NEPTUNE

## (undated) DEVICE — ELECTRODE REM PLYHSV RETURN 9

## (undated) DEVICE — SUT MONOCRYL 4.0 PS2 CP496G

## (undated) DEVICE — COVER CAMERA OPERATING ROOM

## (undated) DEVICE — GOWN POLY REINF BRTH SLV XL